# Patient Record
Sex: MALE | Race: WHITE | Employment: FULL TIME | URBAN - METROPOLITAN AREA
[De-identification: names, ages, dates, MRNs, and addresses within clinical notes are randomized per-mention and may not be internally consistent; named-entity substitution may affect disease eponyms.]

---

## 2017-04-04 ENCOUNTER — OFFICE VISIT (OUTPATIENT)
Dept: INTERNAL MEDICINE CLINIC | Age: 32
End: 2017-04-04

## 2017-04-04 VITALS
TEMPERATURE: 98.2 F | WEIGHT: 178 LBS | BODY MASS INDEX: 23.59 KG/M2 | HEART RATE: 94 BPM | SYSTOLIC BLOOD PRESSURE: 140 MMHG | RESPIRATION RATE: 14 BRPM | DIASTOLIC BLOOD PRESSURE: 102 MMHG | HEIGHT: 73 IN

## 2017-04-04 DIAGNOSIS — B35.6 TINEA CRURIS: Primary | ICD-10-CM

## 2017-04-04 DIAGNOSIS — R21 PENILE RASH: ICD-10-CM

## 2017-04-04 DIAGNOSIS — N50.89 TESTICULAR SWELLING, RIGHT: ICD-10-CM

## 2017-04-04 RX ORDER — NYSTATIN AND TRIAMCINOLONE ACETONIDE 100000; 1 [USP'U]/G; MG/G
OINTMENT TOPICAL 2 TIMES DAILY
Qty: 30 G | Refills: 0 | Status: SHIPPED | OUTPATIENT
Start: 2017-04-04 | End: 2017-06-07 | Stop reason: SDUPTHER

## 2017-04-04 NOTE — PROGRESS NOTES
Chief Complaint   Patient presents with    Testicular Cyst     right     he is a 32y.o. year old male who presents for evaluation of pt thought he felt a lump on right side of testicles about 1 week ago. Wanted to make sure this was ok. Reviewed and agree with Nurse Note and duplicated in this note. Reviewed PmHx, RxHx, FmHx, SocHx, AllgHx and updated and dated in the chart. No family history on file. No past medical history on file. Social History     Social History    Marital status: UNKNOWN     Spouse name: N/A    Number of children: N/A    Years of education: N/A     Social History Main Topics    Smoking status: Never Smoker    Smokeless tobacco: Not on file    Alcohol use Yes      Comment: average 1 beer/day or less    Drug use: No    Sexual activity: Not on file     Other Topics Concern    Not on file     Social History Narrative    No narrative on file        Review of Systems - negative except as listed above      Objective:     Vitals:    04/04/17 0949   BP: (!) 158/102   Pulse: 94   Resp: 14   Temp: 98.2 °F (36.8 °C)   Weight: 178 lb (80.7 kg)   Height: 6' 1\" (1.854 m)       Physical Examination: General appearance - alert, well appearing, and in no distress  Back exam - full range of motion, no tenderness, palpable spasm or pain on motion  Neurological - alert, oriented, normal speech, no focal findings or movement disorder noted  Musculoskeletal - no joint tenderness, deformity or swelling  Extremities - peripheral pulses normal, no pedal edema, no clubbing or cyanosis  Skin - DERMATITIS NOTED: atopic dermatitis on penis head, scaly;  Gluteal cleft red raised rash, no pus or opening  Testicles - right testicle enlarged epididymis. Assessment/ Plan:   Matias Link was seen today for testicular cyst.    Diagnoses and all orders for this visit:    Tinea cruris    Penile rash    Testicular swelling, right  -     US SCROTUM/TESTICLES;  Future    Other orders  -     nystatin-triamcinolone (MYCOLOG) 100,000-0.1 unit/gram-% ointment; Apply  to affected area two (2) times a day. Follow-up Disposition:  Return if symptoms worsen or fail to improve. I have discussed the diagnosis with the patient and the intended plan as seen in the above orders. The patient has received an after-visit summary and questions were answered concerning future plans. Medication Side Effects and Warnings were discussed with patient: yes  Patient Labs were reviewed and or requested: yes  Patient Past Records were reviewed and or requested  yes  I have discussed the diagnosis with the patient and the intended plan as seen in the above orders. The patient has received an after-visit summary and questions were answered concerning future plans. Pt agrees to call or return to clinic and/or go to closest ER with any worsening of symptoms. This may include, but not limited to increased fever (>100.4) with NSAIDS or Tylenol, increased edema, confusion, rash, worsening of presenting symptoms. 1) Remember to stay active and/or exercise regularly (I suggest 30-45 minutes daily)   2) For reliable dietary information, go to www. EATRIGHT.org. You may wish to consider seeing the nutritionist at [facility] at #[phone] or [phone], also consider the [address]   3) I routinely suggest a complete physical exam once each year (your birth month)

## 2017-04-04 NOTE — PATIENT INSTRUCTIONS

## 2017-04-04 NOTE — MR AVS SNAPSHOT
Visit Information Date & Time Provider Department Dept. Phone Encounter #  
 4/4/2017  9:45 AM [facility] [facility] MD Dotty [facility] and [place] [id number] Follow-up Instructions Return if symptoms worsen or fail to improve. Upcoming Health Maintenance Date Due DTaP/Tdap/Td series (1 - Tdap) 6/28/2006 Allergies as of 4/4/2017  Review Complete On: 4/4/2017 By: [address] [facility] MD Dotty  
 No Known Allergies Current Immunizations  Never Reviewed Name Date Influenza Vaccine (Quad) PF 12/7/2016 Not reviewed this visit You Were Diagnosed With   
  
 Codes Comments Tinea cruris    -  Primary ICD-10-CM: B35.6 ICD-9-CM: 110.3 Penile rash     ICD-10-CM: R21 
ICD-9-CM: 607.9 Testicular swelling, right     ICD-10-CM: N50.89 ICD-9-CM: 115.09 Vitals BP Pulse Temp Resp Height(growth percentile) Weight(growth percentile) (!) 140/102 94 98.2 °F (36.8 °C) 14 6' 1\" (1.854 m) 178 lb (80.7 kg) BMI Smoking Status 23.48 kg/m2 Never Smoker Vitals History BMI and BSA Data Body Mass Index Body Surface Area  
 23.48 kg/m 2 2.04 m 2 Preferred Pharmacy Pharmacy Name Phone [id number] [phone] Your Updated Medication List  
  
   
This list is accurate as of: 4/4/17 10:10 AM.  Always use your most recent med list.  
  
  
  
  
 nystatin-triamcinolone 100,000-0.1 unit/gram-% ointment Commonly known as:  Daleen Savant Apply  to affected area two (2) times a day. Prescriptions Sent to Pharmacy Refills  
 nystatin-triamcinolone (MYCOLOG) 100,000-0.1 unit/gram-% ointment 0 Sig: Apply  to affected area two (2) times a day. Class: Normal  
 Pharmacy: [address], [place] Ph #: [phone] Route: Topical  
  
Follow-up Instructions Return if symptoms worsen or fail to improve. To-Do List   
 04/04/2017 Imaging:  US SCROTUM/TESTICLES Patient Instructions Rash: Care Instructions Your Care Instructions A rash is any irritation or inflammation of the skin. Rashes have many possible causes, including allergy, infection, illness, heat, and emotional stress. Follow-up care is a key part of your treatment and safety. Be sure to make and go to all appointments, and call your doctor if you are having problems. Its also a good idea to know your test results and keep a list of the medicines you take. How can you care for yourself at home? · Wash the area with water only. Soap can make dryness and itching worse. Pat dry. · Put cold, wet cloths on the rash to reduce itching. · Keep cool, and stay out of the sun. · Leave the rash open to the air as much of the time as possible. · Sometimes petroleum jelly (Vaseline) can help relieve the discomfort caused by a rash. A moisturizing lotion, such as Cetaphil, also may help. Calamine lotion may help for rashes caused by contact with something (such as a plant or soap) that irritated the skin. Use it 3 or 4 times a day. · If your doctor prescribed a cream, use it as directed. If your doctor prescribed medicine, take it exactly as directed. · If your rash itches so badly that it interferes with your normal activities, take an over-the-counter antihistamine, such as diphenhydramine (Benadryl) or loratadine (Claritin). Read and follow all instructions on the label. When should you call for help? Call your doctor now or seek immediate medical care if: 
· You have signs of infection, such as: 
¨ Increased pain, swelling, warmth, or redness. ¨ Red streaks leading from the area. ¨ Pus draining from the area. ¨ A fever. · You have joint pain along with the rash. Watch closely for changes in your health, and be sure to contact your doctor if: · Your rash is changing or getting worse. For example, call if you have pain along with the rash, the rash is spreading, or you have new blisters. · You do not get better after 1 week. Where can you learn more? Go to http://shaid-louie.info/. Enter O198 in the search box to learn more about \"Rash: Care Instructions. \" Current as of: October 13, 2016 Content Version: 11.2 © 8108-5358 [employer]. Care instructions adapted under license by [employer] (which disclaims liability or warranty for this information). If you have questions about a medical condition or this instruction, always ask your healthcare professional. [address] any warranty or liability for your use of this information. Introducing [facility]! Dear [place]: Thank you for requesting a [facility]` account. Our records indicate that you already have an active [facility]` account. You can access your account anytime at [email or web address] Did you know that you can access your hospital and ER discharge instructions at any time in [facility]`? You can also review all of your test results from your hospital stay or ER visit. Additional Information If you have questions, please visit the Frequently Asked Questions section of the [facility]` website at [email or web address] Remember, [facility]` is NOT to be used for urgent needs. For medical emergencies, dial 911. Now available from your iPhone and Android! Please provide this summary of care documentation to your next provider. Your primary care clinician is listed as Arthur Macdonald. If you have any questions after today's visit, please call [phone].

## 2017-04-07 ENCOUNTER — HOSPITAL ENCOUNTER (OUTPATIENT)
Dept: ULTRASOUND IMAGING | Age: 32
Discharge: HOME | End: 2017-04-07
Attending: FAMILY MEDICINE
Payer: COMMERCIAL

## 2017-04-07 DIAGNOSIS — N50.89 TESTICULAR SWELLING, RIGHT: ICD-10-CM

## 2017-04-07 PROCEDURE — 76870 US EXAM SCROTUM: CPT

## 2017-04-14 ENCOUNTER — TELEPHONE (OUTPATIENT)
Dept: INTERNAL MEDICINE CLINIC | Age: 32
End: 2017-04-14

## 2017-04-14 NOTE — TELEPHONE ENCOUNTER
Pt called requesting to speak with Dr. Elizabeth Bowens stating he has questions concerning his last office visit.  Pt can be reached @ [phone]

## 2017-04-14 NOTE — TELEPHONE ENCOUNTER
Called pt and spoke with , confirmed for pt that an epididymal cyst should not interfere with trying to conceive a child.  Pt understands

## 2017-04-21 ENCOUNTER — OFFICE VISIT (OUTPATIENT)
Dept: INTERNAL MEDICINE CLINIC | Age: 32
End: 2017-04-21

## 2017-04-21 VITALS
TEMPERATURE: 98.1 F | BODY MASS INDEX: 23.59 KG/M2 | WEIGHT: 178 LBS | OXYGEN SATURATION: 100 % | HEART RATE: 58 BPM | DIASTOLIC BLOOD PRESSURE: 98 MMHG | SYSTOLIC BLOOD PRESSURE: 140 MMHG | RESPIRATION RATE: 15 BRPM | HEIGHT: 73 IN

## 2017-04-21 DIAGNOSIS — R03.0 ELEVATED BP WITHOUT DIAGNOSIS OF HYPERTENSION: Primary | ICD-10-CM

## 2017-04-21 NOTE — PATIENT INSTRUCTIONS
DASH diet: Healthy eating to lower your blood pressure  The DASH diet emphasizes portion size, eating a variety of foods and getting the right amount of nutrients. Discover how DASH can improve your health and lower your blood pressure. DASH stands for Dietary Approaches to Stop Hypertension. The DASH diet is a lifelong approach to healthy eating that's designed to help treat or prevent high blood pressure (hypertension). The DASH diet encourages you to reduce the sodium in your diet and eat a variety of foods rich in nutrients that help lower blood pressure, such as potassium, calcium and magnesium. By following the DASH diet, you may be able to reduce your blood pressure by a few points in just two weeks. Over time, your blood pressure could drop by eight to 14 points, which can make a significant difference in your health risks. Because the DASH diet is a healthy way of eating, it offers health benefits besides just lowering blood pressure. The DASH diet may offer protection against osteoporosis, cancer, heart disease, stroke and diabetes. And while the DASH diet is not a weight-loss program, you may indeed lose unwanted pounds because it can help guide you toward healthier meals and snacks. DASH diet: Sodium levels  A key goal of the DASH diet is reducing how much sodium you eat, since sodium can dramatically increase blood pressure in people who are sensitive to its effects. In addition to the standard DASH diet, there is also a lower sodium version of the diet. You can choose the version of the diet that meets your health needs:   Standard DASH diet. You can consume up to 2,300 milligrams (mg) of sodium a day. Lower sodium DASH diet. You can consume up to 1,500 mg of sodium a day. Both versions of the DASH diet aim to reduce the amount of sodium in your diet compared with what you might get in a more traditional diet, which can amount to a whopping 3,500 mg of sodium a day or more.  That level is far beyond the recommendation of the 2005 Dietary Guidelines for Americans of a maximum of 2,300 mg of sodium a day. Studies show that the lower sodium version of the DASH diet is especially helpful in lowering blood pressure for adults who are middle-aged or older, for -Americans and for those who already have high blood pressure. If you aren't sure which version of the DASH diet is best for you, talk to your doctor. DASH diet: What to eat  Both sodium versions of the DASH diet include lots of whole grains, fruits, vegetables and low-fat dairy products. The DASH diet also includes some fish, poultry and legumes. You can eat red meat, sweets and fats in small amounts. The DASH diet is low in saturated fat, cholesterol and total fat. Here's a look at the recommended servings from each food group for the 2,305-spezqvo-m-day DASH diet. Grains (6 to 8 servings a day)  Grains include bread, cereal, rice and pasta. Examples of one serving of grains include 1 slice whole-wheat bread, 1 ounce (oz.) dry cereal, or 1/2 cup cooked cereal, rice or pasta. Focus on whole grains because they have more fiber and nutrients than do refined grains. For instance, use brown rice instead of white rice, whole-wheat pasta instead of regular pasta and whole-grain bread instead of white bread. Look for products labeled \"100 percent whole grain\" or \"100 percent whole wheat. \"   Grains are naturally low in fat, so avoid spreading on butter or adding cream and cheese sauces. Vegetables (4 to 5 servings a day)  Tomatoes, carrots, broccoli, sweet potatoes, greens and other vegetables are full of fiber, vitamins, and such minerals as potassium and magnesium. Examples of one serving include 1 cup raw leafy green vegetables or 1/2 cup cut-up raw or cooked vegetables.    Don't think of vegetables only as side dishes -- a hearty blend of vegetables served over brown rice or whole-wheat noodles can serve as the main dish for a meal. Fresh or frozen vegetables are both good choices. When buying frozen and canned vegetables, choose those labeled as low sodium or without added salt. To increase the number of servings you fit in daily, be creative. In a stir-carney, for instance, cut the amount of meat in half and double up on the vegetables. Fruits (4 to 5 servings a day)  Many fruits need little preparation to become a healthy part of a meal or snack. Like vegetables, they're packed with fiber, potassium and magnesium and are typically low in fat -- exceptions include avocados and coconuts. Examples of one serving include 1 medium fruit or 1/2 cup fresh, frozen or canned fruit. Have a piece of fruit with meals and one as a snack, then round out your day with a dessert of fresh fruits topped with a splash of low-fat yogurt. Leave on edible peels whenever possible. The peels of apples, pears and most fruits with pits add interesting texture to recipes and contain healthy nutrients and fiber. Remember that citrus fruits and juice, such as grapefruit, can interact with certain medications, so check with your doctor or pharmacist to see if they're OK for you. Dairy (2 to 3 servings a day)  Milk, yogurt, cheese and other dairy products are major sources of calcium, vitamin D and protein. But the key is to make sure that you choose dairy products that are low-fat or fat-free because otherwise they can be a major source of fat. Examples of one serving include 1 cup skim or 1% milk, 1 cup yogurt or 1 1/2 oz. cheese. Low-fat or fat-free frozen yogurt can help you boost the amount of dairy products you eat while offering a sweet treat. Add fruit for a healthy twist.   If you have trouble digesting dairy products, choose lactose-free products or consider taking an over-the-counter product that contains the enzyme lactase, which can reduce or prevent the symptoms of lactose intolerance.    Go easy on regular and even fat-free cheeses because they are typically high in sodium. Lean meat, poultry and fish (6 or fewer servings a day)  Meat can be a rich source of protein, B vitamins, iron and zinc. But because even lean varieties contain fat and cholesterol, don't make them a mainstay of your diet -- cut back typical meat portions by one-third or one-half and pile on the vegetables instead. Examples of one serving include 1 oz. cooked skinless poultry, seafood or lean meat, 1 egg, or 1 oz. water-packed, no-salt-added canned tuna. Trim away skin and fat from meat and then broil, grill, roast or poach instead of frying. Eat heart-healthy fish, such as salmon, herring and tuna. These types of fish are high in omega-3 fatty acids, which can help lower your total cholesterol. Nuts, seeds and legumes (4 to 5 servings a week)   Almonds, sunflower seeds, kidney beans, peas, lentils and other foods in this family are good sources of magnesium, potassium and protein. They're also full of fiber and phytochemicals, which are plant compounds that may protect against some cancers and cardiovascular disease. Serving sizes are small and are intended to be consumed weekly because these foods are high in calories. Examples of one serving include 1/3 cup (1 1/2 oz.) nuts, 2 tablespoons seeds or 1/2 cup cooked beans or peas. Nuts sometimes get a bad rap because of their fat content, but they contain healthy types of fat -- monounsaturated fat and omega-3 fatty acids. They're high in calories, however, so eat them in moderation. Try adding them to stir-fries, salads or cereals. Soybean-based products, such as tofu and tempeh, can be a good alternative to meat because they contain all of the amino acids your body needs to make a complete protein, just like meat. They also contain isoflavones, a type of natural plant compound (phytochemical) that has been shown to have some health benefits.   Fats and oils (2 to 3 servings a day)  Fat helps your body absorb essential vitamins and helps your body's immune system. But too much fat increases your risk of heart disease, diabetes and obesity. The DASH diet strives for a healthy balance by providing 30 percent or less of daily calories from fat, with a focus on the healthier unsaturated fats. Examples of one serving include 1 teaspoon soft margarine, 1 tablespoon low-fat mayonnaise or 2 tablespoons light salad dressing. Saturated fat and trans fat are the main dietary culprits in raising your blood cholesterol and increasing your risk of coronary artery disease. DASH helps keep your daily saturated fat to less than 10 percent of your total calories by limiting use of meat, butter, cheese, whole milk, cream and eggs in your diet, along with foods made from lard, solid shortenings, and palm and coconut oils. Avoid trans fat, commonly found in such processed foods as crackers, baked goods and fried items. Read food labels on margarine and salad dressing so that you can choose those that are lowest in saturated fat and free of trans fat. Sweets (5 or fewer a week)  You don't have to banish sweets entirely while following the DASH diet -- just go easy on them. Examples of one serving include 1 tablespoon sugar, jelly or jam, 1/2 cup sorbet or 1 cup (8 oz.) lemonade. When you eat sweets, choose those that are fat-free or low-fat, such as sorbets, fruit ices, jelly beans, hard candy, dami crackers or low-fat cookies. Artificial sweeteners such as aspartame (NutraSweet, Equal) and sucralose (Splenda) may help satisfy your sweet tooth while sparing the sugar. But remember that you still must use them sensibly. It's OK to swap a diet cola for a regular cola, but not in place of a more nutritious beverage such as low-fat milk or even plain water. Cut back on added sugar, which has no nutritional value but can pack on calories. DASH diet: Alcohol and caffeine  Drinking too much alcohol can increase blood pressure.  The DASH diet recommends that men limit alcohol to two or fewer drinks a day and women one or less. The DASH diet doesn't address caffeine consumption. The influence of caffeine on blood pressure remains unclear. But caffeine can cause your blood pressure to rise at least temporarily. If you already have high blood pressure or if you think caffeine is affecting your blood pressure, talk to your doctor about your caffeine consumption. DASH diet and weight loss  The DASH diet is not designed to promote weight loss, but it can be used as part of an overall weight-loss strategy. The DASH diet is based on a diet of about 2,000 calories a day. If you're trying to lose weight, though, you may want to eat around 1,600 a day. You may need to adjust your serving goals based on your health or individual circumstances -- something your health care team can help you decide. Tips to cut back on sodium  The foods at the core of the DASH diet are naturally low in sodium. So just by following the DASH diet, you're likely to reduce your sodium intake. You also can cut back on sodium in your diet by:   Using sodium-free spices or flavorings with your food instead of salt   Not adding salt when cooking rice, pasta or hot cereal   Rinsing canned foods to remove some of the sodium   Buying foods labeled \"no salt added,\" \"sodium-free,\" \"low sodium\" or \"very low sodium\"  One teaspoon of table salt has about 2,300 mg of sodium, and 2/3 teaspoon of table salt has about 1,500 mg of sodium. When you read food labels, you may be surprised at just how much sodium some processed foods contain. Even low-fat soups, canned vegetables, ready-to-eat cereals and sliced turkey from the local deli -- all foods you may have considered healthy -- often have lots of sodium. You may not notice a difference in taste when you choose low-sodium food and beverages.  If things seem too bland, gradually introduce low-sodium foods and cut back on table salt until you reach your sodium goal. That'll give your palate time to adjust. It can take several weeks for your taste buds to get used to less salty foods. Putting the pieces of the DASH diet together   Try these strategies to get started on the DASH diet:   Change gradually. To boost your success, avoid dramatic changes in your eating approach. Instead, change one or two things at a time. If you now eat only one or two servings of fruits or vegetables a day, try to add a serving at lunch and one at dinner. Rather than switching to all whole grains, start by making one or two of your grain servings whole grains. Increasing fruits, vegetables and whole grains gradually can also help prevent bloating or diarrhea that may occur if you aren't used to eating a diet with lots of fiber. You can also try over-the-counter products to help reduce gas from beans and vegetables. Forgive yourself if you backslide. Everyone slips, especially when learning something new. Remember that changing your lifestyle is a long-term process. Find out what triggered your setback and then just  where you left off with the DASH diet. Reward successes. Reward yourself with a nonfood treat for your accomplishments. Add physical activity. To boost your blood pressure lowering efforts even more, consider increasing your physical activity in addition to following the DASH diet. Combining both the DASH diet and physical activity makes it more likely that you'll reduce your blood pressure. Get support if you need it. If you're having trouble sticking to your diet, talk to your doctor or dietitian about it. You might get some tips that will help you stick to the DASH diet. Remember, healthy eating isn't an all-or-nothing proposition. What's most important is that, on average, you eat healthier foods with plenty of variety -- both to keep your diet nutritious and to avoid boredom or extremes. And with the DASH diet, you can have both.

## 2017-04-21 NOTE — PROGRESS NOTES
Chief Complaint   Patient presents with    Hypertension     concerned about Blood pressure     Pt who presents for follow up of a pre-existing problem of ***. Diet and Lifestyle: generally follows a low fat low cholesterol diet, generally follows a low sodium diet, exercises regularly  Home BP Monitoring: is borderline controlled at home, ranging 130's/*90**'s  Use of agents associated with hypertension: none. Cardiovascular ROS: taking medications as instructed, no medication side effects noted, no TIA's, no chest pain on exertion, no dyspnea on exertion, no swelling of ankles. New concerns: none. Reviewed and agree with Nurse Note and duplicated in this note. Reviewed PmHx, RxHx, FmHx, SocHx, AllgHx and updated and dated in the chart. No family history on file. No past medical history on file. Social History     Social History    Marital status:      Spouse name: N/A    Number of children: N/A    Years of education: N/A     Social History Main Topics    Smoking status: Never Smoker    Smokeless tobacco: Not on file    Alcohol use Yes      Comment: average 1 beer/day or less    Drug use: No    Sexual activity: Not on file     Other Topics Concern    Not on file     Social History Narrative    No narrative on file        Review of Systems - negative except as listed above  {ros master:012643}    Objective: There were no vitals filed for this visit. Physical Examination: {male adult master:640362}    Assessment/ Plan:   There are no diagnoses linked to this encounter. Follow-up Disposition: Not on File    I have discussed the diagnosis with the patient and the intended plan as seen in the above orders. The patient has received an after-visit summary and questions were answered concerning future plans.      Medication Side Effects and Warnings were discussed with patient: {yes/ no default yes:72797::\"yes\"}  Patient Labs were reviewed and or requested: {yes/ no default yes:19425::\"yes\"}  Patient Past Records were reviewed and or requested  {yes/ no default yes:19425::\"yes\"}  I have discussed the diagnosis with the patient and the intended plan as seen in the above orders. The patient has received an after-visit summary and questions were answered concerning future plans. Pt agrees to call or return to clinic and/or go to closest ER with any worsening of symptoms. This may include, but not limited to increased fever (>100.4) with NSAIDS or Tylenol, increased edema, confusion, rash, worsening of presenting symptoms. Patient was informed/counseled to:    1) Remember to stay active and/or exercise regularly (I suggest 30-45 minutes daily)   2) For reliable dietary information, go to www. EATRIGHT.org. You may wish to consider seeing the nutritionist at [facility] at #[phone] or [phone], also consider the [address] [facility].   3) I routinely suggest a complete physical exam once each year (your birth month)

## 2017-04-21 NOTE — PROGRESS NOTES
CCP: Hypertension    S: Elena Bassett is a 32 y.o. male who presents for BP follow up. BP has been elevated for past couple of office visits. He has been checking it at home for past several days, running 120-130/80s. Reports he is more nervous at baseline and feels anxious coming into doctor's office. Denies cardiac complaints including chest pain or discomfort, elevated heart rate,  palpitations or edema in extremities. Denies any headache, vision changes, numbness and tingling or weakness in  extremities. Denies respiratory complaints including SOB, difficulty or pain with breathing, wheezes, or cough. Feels well and ROS is otherwise negative. Eats balanced diet, low in saturated and trans fats. No past medical history on file. Current Outpatient Prescriptions   Medication Sig Dispense Refill    nystatin-triamcinolone (MYCOLOG) 100,000-0.1 unit/gram-% ointment Apply  to affected area two (2) times a day. 30 g 0     Pt is taking all medications as prescribed without any side effects or difficulty. No Known Allergies     O: VS:   Visit Vitals    BP (!) 140/98 (BP 1 Location: Right arm)    Pulse (!) 58    Temp 98.1 °F (36.7 °C)    Resp 15    Ht 6' 1\" (1.854 m)    Wt 178 lb (80.7 kg)    SpO2 100%    BMI 23.48 kg/m2     PAIN: No complaints of pain today. GENERAL: Elena Bassett is sitting on exam table in no acute distress. Non-toxic. Well nourished. Well developed. Appropriately groomed. RESP: Breath sounds are symmetrical bilaterally. Unlabored without SOB. Speaking in full sentences. Clear to auscultation bilaterally anteriorly and posteriorly. No wheezes. No rales or rhonchi. CV: normal rate. Regular rhythm. S1, S2 audible. No murmur noted. No rubs, clicks or gallops noted. HEME/LYMPH: peripheral pulses palpable 2+ radial.  No peripheral edema is noted. Assessment and Plan:    Severa Ingle was seen today for hypertension.     Diagnoses and all orders for this visit:    Elevated BP without diagnosis of hypertension    BP readings at home are at goal.  As pt reports he is anxious at baseline, particularly when coming to doctor's office, likely elevated due to this. Reinforced importance of lower sodium intake, regular aerboci exercise regimen and stress relieving measures. May need to consider treatment for anxiety or persistent HTN down the road, but pt declines this at this time.      Patient verbalized understanding and agreed to plan of care. Patient was given an after visit summary which included current diagnoses, medications and vital signs.     Follow up in 3 months

## 2017-04-21 NOTE — MR AVS SNAPSHOT
Visit Information Date & Time Provider Department Dept. Phone Encounter #  
 4/21/2017 10:45 AM Marly Worthington MD [facility] [phone] [id number] Follow-up Instructions Return in about 4 months (around 8/21/2017) for Blood Pressure Check. Upcoming Health Maintenance Date Due DTaP/Tdap/Td series (2 - Td) 4/21/2027 Allergies as of 4/21/2017  Review Complete On: 4/21/2017 By: Marly Worthington MD  
 No Known Allergies Current Immunizations  Never Reviewed Name Date Influenza Vaccine (Quad) PF 12/7/2016 Not reviewed this visit Vitals BP Pulse Temp Resp Height(growth percentile) Weight(growth percentile) (!) 150/106 (!) 58 98.1 °F (36.7 °C) 15 6' 1\" (1.854 m) 178 lb (80.7 kg) SpO2 BMI Smoking Status 100% 23.48 kg/m2 Never Smoker BMI and BSA Data Body Mass Index Body Surface Area  
 23.48 kg/m 2 2.04 m 2 Preferred Pharmacy Pharmacy Name Phone [id number] [phone] Your Updated Medication List  
  
   
This list is accurate as of: 4/21/17 11:26 AM.  Always use your most recent med list.  
  
  
  
  
 nystatin-triamcinolone 100,000-0.1 unit/gram-% ointment Commonly known as:  Izora Quileute Apply  to affected area two (2) times a day. Follow-up Instructions Return in about 4 months (around 8/21/2017) for Blood Pressure Check. Patient Instructions DASH diet: Healthy eating to lower your blood pressure The DASH diet emphasizes portion size, eating a variety of foods and getting the right amount of nutrients. Discover how DASH can improve your health and lower your blood pressure. DASH stands for Dietary Approaches to Stop Hypertension. The DASH diet is a lifelong approach to healthy eating that's designed to help treat or prevent high blood pressure (hypertension).  The DASH diet encourages you to reduce the sodium in your diet and eat a variety of foods rich in nutrients that help lower blood pressure, such as potassium, calcium and magnesium. By following the DASH diet, you may be able to reduce your blood pressure by a few points in just two weeks. Over time, your blood pressure could drop by eight to 14 points, which can make a significant difference in your health risks. Because the DASH diet is a healthy way of eating, it offers health benefits besides just lowering blood pressure. The DASH diet may offer protection against osteoporosis, cancer, heart disease, stroke and diabetes. And while the DASH diet is not a weight-loss program, you may indeed lose unwanted pounds because it can help guide you toward healthier meals and snacks. DASH diet: Sodium levels A key goal of the DASH diet is reducing how much sodium you eat, since sodium can dramatically increase blood pressure in people who are sensitive to its effects. In addition to the standard DASH diet, there is also a lower sodium version of the diet. You can choose the version of the diet that meets your health needs:  
Standard DASH diet. You can consume up to 2,300 milligrams (mg) of sodium a day. Lower sodium DASH diet. You can consume up to 1,500 mg of sodium a day. Both versions of the DASH diet aim to reduce the amount of sodium in your diet compared with what you might get in a more traditional diet, which can amount to a whopping 3,500 mg of sodium a day or more. That level is far beyond the recommendation of the 2005 Dietary Guidelines for Americans of a maximum of 2,300 mg of sodium a day. Studies show that the lower sodium version of the DASH diet is especially helpful in lowering blood pressure for adults who are middle-aged or older, for -Americans and for those who already have high blood pressure. If you aren't sure which version of the DASH diet is best for you, talk to your doctor. DASH diet: What to eat Both sodium versions of the DASH diet include lots of whole grains, fruits, vegetables and low-fat dairy products. The DASH diet also includes some fish, poultry and legumes. You can eat red meat, sweets and fats in small amounts. The DASH diet is low in saturated fat, cholesterol and total fat. Here's a look at the recommended servings from each food group for the 2,629-xtduiga-x-day DASH diet. Grains (6 to 8 servings a day) Grains include bread, cereal, rice and pasta. Examples of one serving of grains include 1 slice whole-wheat bread, 1 ounce (oz.) dry cereal, or 1/2 cup cooked cereal, rice or pasta. Focus on whole grains because they have more fiber and nutrients than do refined grains. For instance, use brown rice instead of white rice, whole-wheat pasta instead of regular pasta and whole-grain bread instead of white bread. Look for products labeled \"100 percent whole grain\" or \"100 percent whole wheat. \" Grains are naturally low in fat, so avoid spreading on butter or adding cream and cheese sauces. Vegetables (4 to 5 servings a day) Tomatoes, carrots, broccoli, sweet potatoes, greens and other vegetables are full of fiber, vitamins, and such minerals as potassium and magnesium. Examples of one serving include 1 cup raw leafy green vegetables or 1/2 cup cut-up raw or cooked vegetables. Don't think of vegetables only as side dishes  a hearty blend of vegetables served over brown rice or whole-wheat noodles can serve as the main dish for a meal.  
Fresh or frozen vegetables are both good choices. When buying frozen and canned vegetables, choose those labeled as low sodium or without added salt. To increase the number of servings you fit in daily, be creative. In a stir-carney, for instance, cut the amount of meat in half and double up on the vegetables. Fruits (4 to 5 servings a day) Many fruits need little preparation to become a healthy part of a meal or snack. Like vegetables, they're packed with fiber, potassium and magnesium and are typically low in fat  exceptions include avocados and coconuts. Examples of one serving include 1 medium fruit or 1/2 cup fresh, frozen or canned fruit. Have a piece of fruit with meals and one as a snack, then round out your day with a dessert of fresh fruits topped with a splash of low-fat yogurt. Leave on edible peels whenever possible. The peels of apples, pears and most fruits with pits add interesting texture to recipes and contain healthy nutrients and fiber. Remember that citrus fruits and juice, such as grapefruit, can interact with certain medications, so check with your doctor or pharmacist to see if they're OK for you. Dairy (2 to 3 servings a day) Milk, yogurt, cheese and other dairy products are major sources of calcium, vitamin D and protein. But the key is to make sure that you choose dairy products that are low-fat or fat-free because otherwise they can be a major source of fat. Examples of one serving include 1 cup skim or 1% milk, 1 cup yogurt or 1 1/2 oz. cheese. Low-fat or fat-free frozen yogurt can help you boost the amount of dairy products you eat while offering a sweet treat. Add fruit for a healthy twist.  
If you have trouble digesting dairy products, choose lactose-free products or consider taking an over-the-counter product that contains the enzyme lactase, which can reduce or prevent the symptoms of lactose intolerance. Go easy on regular and even fat-free cheeses because they are typically high in sodium. Lean meat, poultry and fish (6 or fewer servings a day) Meat can be a rich source of protein, B vitamins, iron and zinc. But because even lean varieties contain fat and cholesterol, don't make them a mainstay of your diet  cut back typical meat portions by one-third or one-half and pile on the vegetables instead.  Examples of one serving include 1 oz. cooked skinless poultry, seafood or lean meat, 1 egg, or 1 oz. water-packed, no-salt-added canned tuna. Trim away skin and fat from meat and then broil, grill, roast or poach instead of frying. Eat heart-healthy fish, such as salmon, herring and tuna. These types of fish are high in omega-3 fatty acids, which can help lower your total cholesterol. Nuts, seeds and legumes (4 to 5 servings a week) Almonds, sunflower seeds, kidney beans, peas, lentils and other foods in this family are good sources of magnesium, potassium and protein. They're also full of fiber and phytochemicals, which are plant compounds that may protect against some cancers and cardiovascular disease. Serving sizes are small and are intended to be consumed weekly because these foods are high in calories. Examples of one serving include 1/3 cup (1 1/2 oz.) nuts, 2 tablespoons seeds or 1/2 cup cooked beans or peas. Nuts sometimes get a bad rap because of their fat content, but they contain healthy types of fat  monounsaturated fat and omega-3 fatty acids. They're high in calories, however, so eat them in moderation. Try adding them to stir-fries, salads or cereals. Soybean-based products, such as tofu and tempeh, can be a good alternative to meat because they contain all of the amino acids your body needs to make a complete protein, just like meat. They also contain isoflavones, a type of natural plant compound (phytochemical) that has been shown to have some health benefits. Fats and oils (2 to 3 servings a day) Fat helps your body absorb essential vitamins and helps your body's immune system. But too much fat increases your risk of heart disease, diabetes and obesity. The DASH diet strives for a healthy balance by providing 30 percent or less of daily calories from fat, with a focus on the healthier unsaturated fats.  Examples of one serving include 1 teaspoon soft margarine, 1 tablespoon low-fat mayonnaise or 2 tablespoons light salad dressing. Saturated fat and trans fat are the main dietary culprits in raising your blood cholesterol and increasing your risk of coronary artery disease. DASH helps keep your daily saturated fat to less than 10 percent of your total calories by limiting use of meat, butter, cheese, whole milk, cream and eggs in your diet, along with foods made from lard, solid shortenings, and palm and coconut oils. Avoid trans fat, commonly found in such processed foods as crackers, baked goods and fried items. Read food labels on margarine and salad dressing so that you can choose those that are lowest in saturated fat and free of trans fat. Sweets (5 or fewer a week) You don't have to banish sweets entirely while following the DASH diet  just go easy on them. Examples of one serving include 1 tablespoon sugar, jelly or jam, 1/2 cup sorbet or 1 cup (8 oz.) lemonade. When you eat sweets, choose those that are fat-free or low-fat, such as sorbets, fruit ices, jelly beans, hard candy, dami crackers or low-fat cookies. Artificial sweeteners such as aspartame (NutraSweet, Equal) and sucralose (Splenda) may help satisfy your sweet tooth while sparing the sugar. But remember that you still must use them sensibly. It's OK to swap a diet cola for a regular cola, but not in place of a more nutritious beverage such as low-fat milk or even plain water. Cut back on added sugar, which has no nutritional value but can pack on calories. DASH diet: Alcohol and caffeine Drinking too much alcohol can increase blood pressure. The DASH diet recommends that men limit alcohol to two or fewer drinks a day and women one or less. The DASH diet doesn't address caffeine consumption. The influence of caffeine on blood pressure remains unclear. But caffeine can cause your blood pressure to rise at least temporarily.  If you already have high blood pressure or if you think caffeine is affecting your blood pressure, talk to your doctor about your caffeine consumption. DASH diet and weight loss The DASH diet is not designed to promote weight loss, but it can be used as part of an overall weight-loss strategy. The DASH diet is based on a diet of about 2,000 calories a day. If you're trying to lose weight, though, you may want to eat around 1,600 a day. You may need to adjust your serving goals based on your health or individual circumstances  something your health care team can help you decide. Tips to cut back on sodium The foods at the core of the DASH diet are naturally low in sodium. So just by following the DASH diet, you're likely to reduce your sodium intake. You also can cut back on sodium in your diet by:  
Using sodium-free spices or flavorings with your food instead of salt Not adding salt when cooking rice, pasta or hot cereal  
Rinsing canned foods to remove some of the sodium Buying foods labeled \"no salt added,\" \"sodium-free,\" \"low sodium\" or \"very low sodium\" One teaspoon of table salt has about 2,300 mg of sodium, and 2/3 teaspoon of table salt has about 1,500 mg of sodium. When you read food labels, you may be surprised at just how much sodium some processed foods contain. Even low-fat soups, canned vegetables, ready-to-eat cereals and sliced turkey from the local deli  all foods you may have considered healthy  often have lots of sodium. You may not notice a difference in taste when you choose low-sodium food and beverages. If things seem too bland, gradually introduce low-sodium foods and cut back on table salt until you reach your sodium goal. That'll give your palate time to adjust. It can take several weeks for your taste buds to get used to less salty foods. Putting the pieces of the DASH diet together Try these strategies to get started on the DASH diet: Change gradually. To boost your success, avoid dramatic changes in your eating approach. Instead, change one or two things at a time. If you now eat only one or two servings of fruits or vegetables a day, try to add a serving at lunch and one at dinner. Rather than switching to all whole grains, start by making one or two of your grain servings whole grains. Increasing fruits, vegetables and whole grains gradually can also help prevent bloating or diarrhea that may occur if you aren't used to eating a diet with lots of fiber. You can also try over-the-counter products to help reduce gas from beans and vegetables. Forgive yourself if you backslide. Everyone slips, especially when learning something new. Remember that changing your lifestyle is a long-term process. Find out what triggered your setback and then just  where you left off with the DASH diet. Reward successes. Reward yourself with a nonfood treat for your accomplishments. Add physical activity. To boost your blood pressure lowering efforts even more, consider increasing your physical activity in addition to following the DASH diet. Combining both the DASH diet and physical activity makes it more likely that you'll reduce your blood pressure. Get support if you need it. If you're having trouble sticking to your diet, talk to your doctor or dietitian about it. You might get some tips that will help you stick to the DASH diet. Remember, healthy eating isn't an all-or-nothing proposition. What's most important is that, on average, you eat healthier foods with plenty of variety  both to keep your diet nutritious and to avoid boredom or extremes. And with the DASH diet, you can have both. Introducing [facility]! Deaarac[employer]: Thank you for requesting a [employer] account. Our records indicate that you already have an active [employer] account. You can access your account anytime at [email or web address] Did you know that you can access your hospital and ER discharge instructions at any time in [employer]? You can also review all of your test results from your hospital stay or ER visit. Additional Information If you have questions, please visit the Frequently Asked Questions section of the [employer] website at [email or web address] Remember, [employer] is NOT to be used for urgent needs. For medical emergencies, dial 911. Now available from your iPhone and Android! Please provide this summary of care documentation to your next provider. Your primary care clinician is listed as Paco Vargas. If you have any questions after today's visit, please call [phone].

## 2017-06-01 ENCOUNTER — OFFICE VISIT (OUTPATIENT)
Dept: INTERNAL MEDICINE CLINIC | Age: 32
End: 2017-06-01

## 2017-06-01 VITALS
DIASTOLIC BLOOD PRESSURE: 107 MMHG | TEMPERATURE: 97.9 F | RESPIRATION RATE: 14 BRPM | HEIGHT: 73 IN | HEART RATE: 90 BPM | WEIGHT: 176 LBS | BODY MASS INDEX: 23.33 KG/M2 | OXYGEN SATURATION: 98 % | SYSTOLIC BLOOD PRESSURE: 150 MMHG

## 2017-06-01 DIAGNOSIS — R03.0 ELEVATED BLOOD PRESSURE READING: ICD-10-CM

## 2017-06-01 DIAGNOSIS — M62.838 MUSCLE SPASM: ICD-10-CM

## 2017-06-01 DIAGNOSIS — M54.50 ACUTE BILATERAL LOW BACK PAIN WITHOUT SCIATICA: Primary | ICD-10-CM

## 2017-06-01 LAB
BILIRUB UR QL STRIP: NEGATIVE
GLUCOSE UR-MCNC: NEGATIVE MG/DL
KETONES P FAST UR STRIP-MCNC: NEGATIVE MG/DL
PH UR STRIP: 6 [PH] (ref 4.6–8)
PROT UR QL STRIP: NEGATIVE MG/DL
SP GR UR STRIP: 1.01 (ref 1–1.03)
UA UROBILINOGEN AMB POC: NORMAL (ref 0.2–1)
URINALYSIS CLARITY POC: CLEAR
URINALYSIS COLOR POC: YELLOW
URINE BLOOD POC: NEGATIVE
URINE LEUKOCYTES POC: NEGATIVE
URINE NITRITES POC: NEGATIVE

## 2017-06-01 NOTE — MR AVS SNAPSHOT
Visit Information Date & Time Provider Department Dept. Phone Encounter #  
 6/1/2017 11:30 AM [facility] [place] [address] [id number] Your Appointments 8/31/2017 10:00 AM  
Any with Madalyn Bhatia MD  
[address] [place] [address] and Primary Care [address] [facility]) Appt Note: 4 mo f/up for blood pressure check [address] [facility]  
  
   
 [address] [id number] Upcoming Health Maintenance Date Due INFLUENZA AGE 9 TO ADULT 8/1/2017 DTaP/Tdap/Td series (2 - Td) 4/21/2027 Allergies as of 6/1/2017  Review Complete On: 6/1/2017 By: Maddy De La Garza No Known Allergies Current Immunizations  Never Reviewed Name Date Influenza Vaccine (Quad) PF 12/7/2016 Not reviewed this visit Vitals BP Pulse Temp Resp Height(growth percentile) Weight(growth percentile) (!) 150/107 (BP 1 Location: Right arm, BP Patient Position: Sitting) 90 97.9 °F (36.6 °C) (Oral) 14 6' 1\" (1.854 m) 176 lb (79.8 kg) SpO2 BMI Smoking Status 98% 23.22 kg/m2 Never Smoker Vitals History BMI and BSA Data Body Mass Index Body Surface Area  
 23.22 kg/m 2 2.03 m 2 Preferred Pharmacy Pharmacy Name Phone [id number] [phone] Your Updated Medication List  
  
   
This list is accurate as of: 6/1/17 12:38 PM.  Always use your most recent med list.  
  
  
  
  
 nystatin-triamcinolone 100,000-0.1 unit/gram-% ointment Commonly known as:  Tignall Rue Apply  to affected area two (2) times a day. Patient Instructions Back Care and Preventing Injuries: Care Instructions Your Care Instructions You can hurt your back doing many everyday activities: lifting a heavy box, bending down to garden, exercising at the gym, and even getting out of bed. But you can keep your back strong and healthy by doing some exercises. You also can follow a few tips for sitting, sleeping, and lifting to avoid hurting your back again. Talk to your doctor before you start an exercise program. Ask for help if you want to learn more about keeping your back healthy. Follow-up care is a key part of your treatment and safety. Be sure to make and go to all appointments, and call your doctor if you are having problems. It's also a good idea to know your test results and keep a list of the medicines you take. How can you care for yourself at home? · Stay at a healthy weight to avoid strain on your lower back. · Do not smoke. Smoking increases the risk of osteoporosis, which weakens the spine. If you need help quitting, talk to your doctor about stop-smoking programs and medicines. These can increase your chances of quitting for good. · Make sure you sleep in a position that maintains your back's normal curves and on a mattress that feels comfortable. Sleep on your side with a pillow between your knees, or sleep on your back with a pillow under your knees. These positions can reduce strain on your back. · When you get out of bed, lie on your side and bend both knees. Drop your feet over the edge of the bed as you push up with both arms. Scoot to the edge of the bed. Make sure your feet are in line with your rear end (buttocks), and then stand up. · If you must stand for a long time, put one foot on a stool, ledge, or box. Exercise to strengthen your back and other muscles · Get at least 30 minutes of exercise on most days of the week. Walking is a good choice. You also may want to do other activities, such as running, swimming, cycling, or playing tennis or team sports. · Stretch your back muscles. Here are few exercises to try: ¨ Lie on your back with your knees bent and your feet flat on the floor. Gently pull one bent knee to your chest. Put that foot back on the floor, and then pull the other knee to your chest. Hold for 15 to 30 seconds. Repeat 2 to 4 times. ¨ Do pelvic tilts. Lie on your back with your knees bent. Tighten your stomach muscles. Pull your belly button (navel) in and up toward your ribs. You should feel like your back is pressing to the floor and your hips and pelvis are slightly lifting off the floor. Hold for 6 seconds while breathing smoothly. · Keep your core muscles strong. The muscles of your back, belly (abdomen), and buttocks support your spine. ¨ Pull in your belly, and imagine pulling your navel toward your spine. Hold this for 6 seconds, then relax. Remember to keep breathing normally as you tense your muscles. ¨ Do curl-ups. Always do them with your knees bent. Keep your low back on the floor, and curl your shoulders toward your knees using a smooth, slow motion. Keep your arms folded across your chest. If this bothers your neck, try putting your hands behind your neck (not your head), with your elbows spread apart. ¨ Lie on your back with your knees bent and your feet flat on the floor. Tighten your belly muscles, and then push with your feet and raise your buttocks up a few inches. Hold this position 6 seconds as you continue to breathe normally, then lower yourself slowly to the floor. Repeat 8 to 12 times. ¨ If you like group exercise, try Pilates or yoga. These classes have poses that strengthen the core muscles. Protect your back when you sit · Place a small pillow, a rolled-up towel, or a lumbar roll in the curve of your back if you need extra support. · Sit in a chair that is low enough to let you place both feet flat on the floor with both knees nearly level with your hips. If your chair or desk is too high, use a foot rest to raise your knees. · When driving, keep your knees nearly level with your hips.  Sit straight, and drive with both hands on the steering wheel. Your arms should be in a slightly bent position. · Try a kneeling chair, which helps tilt your hips forward. This takes pressure off your lower back. · Try sitting on an exercise ball. It can rock from side to side, which helps keep your back loose. Lift properly · Squat down, bending at the hips and knees only. If you need to, put one knee to the floor and extend your other knee in front of you, bent at a right angle (half kneeling). · Press your chest straight forward. This helps keep your upper back straight while keeping a slight arch in your low back. · Hold the load as close to your body as possible, at the level of your navel. · Use your feet to change direction, taking small steps. · Lead with your hips as you change direction. Keep your shoulders in line with your hips as you move. Do not twist your body. · Set down your load carefully, squatting with your knees and hips only. When should you call for help? Watch closely for changes in your health, and be sure to contact your doctor if: 
· You want more exercises to make your back and other core muscles stronger. Where can you learn more? Go to http://shadi-louie.info/. Enter S810 in the search box to learn more about \"Back Care and Preventing Injuries: Care Instructions. \" Current as of: May 23, 2016 Content Version: 11.2 © 8338-0533 [employer]. Care instructions adapted under license by [employer]folio (which disclaims liability or warranty for this information). If you have questions about a medical condition or this instruction, always ask your healthcare professional. [address] any warranty or liability for your use of this information. Low Back Pain: Exercises Your Care Instructions Here are some examples of typical rehabilitation exercises for your condition. Start each exercise slowly. Ease off the exercise if you start to have pain. Your doctor or physical therapist will tell you when you can start these exercises and which ones will work best for you. How to do the exercises Press-up 1. Lie on your stomach, supporting your body with your forearms. 2. Press your elbows down into the floor to raise your upper back. As you do this, relax your stomach muscles and allow your back to arch without using your back muscles. As your press up, do not let your hips or pelvis come off the floor. 3. Hold for 15 to 30 seconds, then relax. 4. Repeat 2 to 4 times. Alternate arm and leg (bird dog) exercise Note: Do this exercise slowly. Try to keep your body straight at all times, and do not let one hip drop lower than the other. 1. Start on the floor, on your hands and knees. 2. Tighten your belly muscles. 3. Raise one leg off the floor, and hold it straight out behind you. Be careful not to let your hip drop down, because that will twist your trunk. 4. Hold for about 6 seconds, then lower your leg and switch to the other leg. 5. Repeat 8 to 12 times on each leg. 6. Over time, work up to holding for 10 to 30 seconds each time. 7. If you feel stable and secure with your leg raised, try raising the opposite arm straight out in front of you at the same time. Knee-to-chest exercise 1. Lie on your back with your knees bent and your feet flat on the floor. 2. Bring one knee to your chest, keeping the other foot flat on the floor (or keeping the other leg straight, whichever feels better on your lower back). 3. Keep your lower back pressed to the floor. Hold for at least 15 to 30 seconds. 4. Relax, and lower the knee to the starting position. 5. Repeat with the other leg. Repeat 2 to 4 times with each leg. 6. To get more stretch, put your other leg flat on the floor while pulling your knee to your chest. 
Curl-ups 1. Lie on the floor on your back with your knees bent at a 90-degree angle. Your feet should be flat on the floor, about 12 inches from your buttocks. 2. Cross your arms over your chest. If this bothers your neck, try putting your hands behind your neck (not your head), with your elbows spread apart. 3. Slowly tighten your belly muscles and raise your shoulder blades off the floor. 4. Keep your head in line with your body, and do not press your chin to your chest. 
5. Hold this position for 1 or 2 seconds, then slowly lower yourself back down to the floor. 6. Repeat 8 to 12 times. Pelvic tilt exercise 1. Lie on your back with your knees bent. 2. \"Brace\" your stomach. This means to tighten your muscles by pulling in and imagining your belly button moving toward your spine. You should feel like your back is pressing to the floor and your hips and pelvis are rocking back. 3. Hold for about 6 seconds while you breathe smoothly. 4. Repeat 8 to 12 times. Heel dig bridging 1. Lie on your back with both knees bent and your ankles bent so that only your heels are digging into the floor. Your knees should be bent about 90 degrees. 2. Then push your heels into the floor, squeeze your buttocks, and lift your hips off the floor until your shoulders, hips, and knees are all in a straight line. 3. Hold for about 6 seconds as you continue to breathe normally, and then slowly lower your hips back down to the floor and rest for up to 10 seconds. 4. Do 8 to 12 repetitions. Hamstring stretch in doorway 1. Lie on your back in a doorway, with one leg through the open door. 2. Slide your leg up the wall to straighten your knee. You should feel a gentle stretch down the back of your leg. 3. Hold the stretch for at least 15 to 30 seconds. Do not arch your back, point your toes, or bend either knee. Keep one heel touching the floor and the other heel touching the wall. 4. Repeat with your other leg. 5. Do 2 to 4 times for each leg. Hip flexor stretch 1. Kneel on the floor with one knee bent and one leg behind you. Place your forward knee over your foot. Keep your other knee touching the floor. 2. Slowly push your hips forward until you feel a stretch in the upper thigh of your rear leg. 3. Hold the stretch for at least 15 to 30 seconds. Repeat with your other leg. 4. Do 2 to 4 times on each side. Wall sit 1. Stand with your back 10 to 12 inches away from a wall. 2. Lean into the wall until your back is flat against it. 3. Slowly slide down until your knees are slightly bent, pressing your lower back into the wall. 4. Hold for about 6 seconds, then slide back up the wall. 5. Repeat 8 to 12 times. Follow-up care is a key part of your treatment and safety. Be sure to make and go to all appointments, and call your doctor if you are having problems. It's also a good idea to know your test results and keep a list of the medicines you take. Where can you learn more? Go to http://shadi-louie.info/. Enter R441 in the search box to learn more about \"Low Back Pain: Exercises. \" Current as of: May 23, 2016 Content Version: 11.2 © 2888-6454 [employer]. Care instructions adapted under license by [employer] (which disclaims liability or warranty for this information). If you have questions about a medical condition or this instruction, always ask your healthcare professional. [place] [address] any warranty or liability for your use of this information. Introducing [facility]! Dear Fabienne Yang: Thank you for requesting a [employer]ition account. Our records indicate that you already have an active [employer] Expedition account. You can access your account anytime at [email or web address] Did you know that you can access your hospital and ER discharge instructions at any time in Data Expedition?   You can also review all of your test results from your hospital stay or ER visit. Additional Information If you have questions, please visit the Frequently Asked Questions section of the [employer]Sand website at [email or web address] Remember, [employer]Sand is NOT to be used for urgent needs. For medical emergencies, dial 911. Now available from your iPhone and Android! Please provide this summary of care documentation to your next provider. Your primary care clinician is listed as Leno Christy. If you have any questions after today's visit, please call [phone].

## 2017-06-01 NOTE — PROGRESS NOTES
Mr. Vinnie Nolasco is a 32y.o. year old male who had concerns including Back Pain. Pt is new to me. HPI:  Chief Complaint   Patient presents with    Back Pain     Knots lower back bilateral, x2 weeks. Better with stretching, worse with massage/pressure. White coat hypertension, BP elevated. No sx  History reviewed. No pertinent past medical history. Current Outpatient Prescriptions   Medication Sig Dispense    nystatin-triamcinolone (MYCOLOG) 100,000-0.1 unit/gram-% ointment Apply  to affected area two (2) times a day. 30 g     No current facility-administered medications for this visit. Reviewed PmHx, RxHx, FmHx, SocHx, AllgHx and updated and dated in the chart. ROS: Negative except for BOLD  General: fever, chills, fatigue  Respiratory: cough, SOB, wheezing  Cardiovascular:  CP, palpitation, DRIVER, edema   Gastrointestinal: N/V/D, bleeding  Genito-Urinary: dysuria, hematuria  Musculoskeletal: muscle weakness, pain, swelling    OBJECTIVE:   Visit Vitals    BP (!) 150/107 (BP 1 Location: Right arm, BP Patient Position: Sitting)    Pulse 90    Temp 97.9 °F (36.6 °C) (Oral)    Resp 14    Ht 6' 1\" (1.854 m)    Wt 176 lb (79.8 kg)    SpO2 98%    BMI 23.22 kg/m2     GEN: The patient appears well, alert, oriented x 3, in no distress. ENT: bilateral TM and canal normal.  Neck supple. No adenopathy or thyromegaly. CELENA. Lungs: clear bilaterally, good air entry, no wheezes, rhonchi or rales. Cardiovascular: regular rate and rhythm. S1 and S2 normal, no murmurs,  Abdomen: + BS, soft without tenderness, guarding, rebound, mass or organomegaly. Extremities: no edema, normal peripheral pulses. Neurological: normal, gross sensory and motor in tact without focal findings. MSK:    Posture: Normal   Deformity: None    ROM:     Flexion: Normal    Extension: Normal     Lateral bending: Normal      Gait: Normal       Palpation:    L1-L5: No tenderness    Sacrum: No tenderness    Coccyx:  No tenderness    Left Paraspinal: No tenderness, palpable slight prominence    Right Paraspinal: No tenderness     Strength (0-5/5)    Hip Flexion:   Left: 5/5  Right: 5/5    Hip Extension:  Left: 5/5  Right: 5/5    Hip Abduction:  Left: 5/5  Right: 5/5    Hip Adduction:  Left: 5/5  Right: 5/5    Knee Extension:  Left: 5/5  Right: 5/5    Knee Flexion:   Left: 5/5  Right: 5/5    Ankle dorsiflexion:  Left: 5/5  Right: 5/5    Ankle plantarflexion:  Left: 5/5  Right: 5/5    Great toe extension:  Left: 5/5  Right: 5/5     Sensation: Intact, no deficits      DTR:    Patella:  Left: +2  Right: +2    Achilles:  Left: +2  Right: +2     Special test:    Straight leg: Left: Negative  Right: Negative    Ronalds: Left: Negative  Right: Negative    Piriformis: Left: Negative  Right: Negative              Assessment/ Plan:       ICD-10-CM ICD-9-CM    1. Acute bilateral low back pain without sciatica M54.5 724.2      338.19    2. Muscle spasm M62.838 728.85        Low back muscle tension, spasms likely. No signs of abnormal nodules or growths   Deep tissue massage suggested  R/o hematuria with ua    I have discussed the diagnosis with the patient and the intended plan as seen in the above orders. The patient has received an after-visit summary and questions were answered concerning future plans. Medication Side Effects and Warnings were discussed with patient.     Follow-up Disposition: Not on R Amrik Jeronimo MD

## 2017-06-01 NOTE — PATIENT INSTRUCTIONS
Back Care and Preventing Injuries: Care Instructions  Your Care Instructions  You can hurt your back doing many everyday activities: lifting a heavy box, bending down to garden, exercising at the gym, and even getting out of bed. But you can keep your back strong and healthy by doing some exercises. You also can follow a few tips for sitting, sleeping, and lifting to avoid hurting your back again. Talk to your doctor before you start an exercise program. Ask for help if you want to learn more about keeping your back healthy. Follow-up care is a key part of your treatment and safety. Be sure to make and go to all appointments, and call your doctor if you are having problems. It's also a good idea to know your test results and keep a list of the medicines you take. How can you care for yourself at home? · Stay at a healthy weight to avoid strain on your lower back. · Do not smoke. Smoking increases the risk of osteoporosis, which weakens the spine. If you need help quitting, talk to your doctor about stop-smoking programs and medicines. These can increase your chances of quitting for good. · Make sure you sleep in a position that maintains your back's normal curves and on a mattress that feels comfortable. Sleep on your side with a pillow between your knees, or sleep on your back with a pillow under your knees. These positions can reduce strain on your back. · When you get out of bed, lie on your side and bend both knees. Drop your feet over the edge of the bed as you push up with both arms. Scoot to the edge of the bed. Make sure your feet are in line with your rear end (buttocks), and then stand up. · If you must stand for a long time, put one foot on a stool, ledge, or box. Exercise to strengthen your back and other muscles  · Get at least 30 minutes of exercise on most days of the week. Walking is a good choice.  You also may want to do other activities, such as running, swimming, cycling, or playing tennis or team sports. · Stretch your back muscles. Here are few exercises to try:  Young Kamaljit on your back with your knees bent and your feet flat on the floor. Gently pull one bent knee to your chest. Put that foot back on the floor, and then pull the other knee to your chest. Hold for 15 to 30 seconds. Repeat 2 to 4 times. ¨ Do pelvic tilts. Lie on your back with your knees bent. Tighten your stomach muscles. Pull your belly button (navel) in and up toward your ribs. You should feel like your back is pressing to the floor and your hips and pelvis are slightly lifting off the floor. Hold for 6 seconds while breathing smoothly. · Keep your core muscles strong. The muscles of your back, belly (abdomen), and buttocks support your spine. ¨ Pull in your belly, and imagine pulling your navel toward your spine. Hold this for 6 seconds, then relax. Remember to keep breathing normally as you tense your muscles. ¨ Do curl-ups. Always do them with your knees bent. Keep your low back on the floor, and curl your shoulders toward your knees using a smooth, slow motion. Keep your arms folded across your chest. If this bothers your neck, try putting your hands behind your neck (not your head), with your elbows spread apart. ¨ Lie on your back with your knees bent and your feet flat on the floor. Tighten your belly muscles, and then push with your feet and raise your buttocks up a few inches. Hold this position 6 seconds as you continue to breathe normally, then lower yourself slowly to the floor. Repeat 8 to 12 times. ¨ If you like group exercise, try Pilates or yoga. These classes have poses that strengthen the core muscles. Protect your back when you sit  · Place a small pillow, a rolled-up towel, or a lumbar roll in the curve of your back if you need extra support. · Sit in a chair that is low enough to let you place both feet flat on the floor with both knees nearly level with your hips.  If your chair or desk is too high, use a foot rest to raise your knees. · When driving, keep your knees nearly level with your hips. Sit straight, and drive with both hands on the steering wheel. Your arms should be in a slightly bent position. · Try a kneeling chair, which helps tilt your hips forward. This takes pressure off your lower back. · Try sitting on an exercise ball. It can rock from side to side, which helps keep your back loose. Lift properly  · Squat down, bending at the hips and knees only. If you need to, put one knee to the floor and extend your other knee in front of you, bent at a right angle (half kneeling). · Press your chest straight forward. This helps keep your upper back straight while keeping a slight arch in your low back. · Hold the load as close to your body as possible, at the level of your navel. · Use your feet to change direction, taking small steps. · Lead with your hips as you change direction. Keep your shoulders in line with your hips as you move. Do not twist your body. · Set down your load carefully, squatting with your knees and hips only. When should you call for help? Watch closely for changes in your health, and be sure to contact your doctor if:  · You want more exercises to make your back and other core muscles stronger. Where can you learn more? Go to http://shadi-louie.info/. Enter S810 in the search box to learn more about \"Back Care and Preventing Injuries: Care Instructions. \"  Current as of: May 23, 2016  Content Version: 11.2  © 9119-8542 Healthwise, Incorporated. Care instructions adapted under license by [employer] (which disclaims liability or warranty for this information). If you have questions about a medical condition or this instruction, always ask your healthcare professional. [place] [address] any warranty or liability for your use of this information.        Low Back Pain: Exercises  Your Care Instructions  Here are some examples of typical rehabilitation exercises for your condition. Start each exercise slowly. Ease off the exercise if you start to have pain. Your doctor or physical therapist will tell you when you can start these exercises and which ones will work best for you. How to do the exercises  Press-up    1. Lie on your stomach, supporting your body with your forearms. 2. Press your elbows down into the floor to raise your upper back. As you do this, relax your stomach muscles and allow your back to arch without using your back muscles. As your press up, do not let your hips or pelvis come off the floor. 3. Hold for 15 to 30 seconds, then relax. 4. Repeat 2 to 4 times. Alternate arm and leg (bird dog) exercise    Note: Do this exercise slowly. Try to keep your body straight at all times, and do not let one hip drop lower than the other. 1. Start on the floor, on your hands and knees. 2. Tighten your belly muscles. 3. Raise one leg off the floor, and hold it straight out behind you. Be careful not to let your hip drop down, because that will twist your trunk. 4. Hold for about 6 seconds, then lower your leg and switch to the other leg. 5. Repeat 8 to 12 times on each leg. 6. Over time, work up to holding for 10 to 30 seconds each time. 7. If you feel stable and secure with your leg raised, try raising the opposite arm straight out in front of you at the same time. Knee-to-chest exercise    1. Lie on your back with your knees bent and your feet flat on the floor. 2. Bring one knee to your chest, keeping the other foot flat on the floor (or keeping the other leg straight, whichever feels better on your lower back). 3. Keep your lower back pressed to the floor. Hold for at least 15 to 30 seconds. 4. Relax, and lower the knee to the starting position. 5. Repeat with the other leg. Repeat 2 to 4 times with each leg.   6. To get more stretch, put your other leg flat on the floor while pulling your knee to your chest.  Curl-ups    1. Lie on the floor on your back with your knees bent at a 90-degree angle. Your feet should be flat on the floor, about 12 inches from your buttocks. 2. Cross your arms over your chest. If this bothers your neck, try putting your hands behind your neck (not your head), with your elbows spread apart. 3. Slowly tighten your belly muscles and raise your shoulder blades off the floor. 4. Keep your head in line with your body, and do not press your chin to your chest.  5. Hold this position for 1 or 2 seconds, then slowly lower yourself back down to the floor. 6. Repeat 8 to 12 times. Pelvic tilt exercise    1. Lie on your back with your knees bent. 2. \"Brace\" your stomach. This means to tighten your muscles by pulling in and imagining your belly button moving toward your spine. You should feel like your back is pressing to the floor and your hips and pelvis are rocking back. 3. Hold for about 6 seconds while you breathe smoothly. 4. Repeat 8 to 12 times. Heel dig bridging    1. Lie on your back with both knees bent and your ankles bent so that only your heels are digging into the floor. Your knees should be bent about 90 degrees. 2. Then push your heels into the floor, squeeze your buttocks, and lift your hips off the floor until your shoulders, hips, and knees are all in a straight line. 3. Hold for about 6 seconds as you continue to breathe normally, and then slowly lower your hips back down to the floor and rest for up to 10 seconds. 4. Do 8 to 12 repetitions. Hamstring stretch in doorway    1. Lie on your back in a doorway, with one leg through the open door. 2. Slide your leg up the wall to straighten your knee. You should feel a gentle stretch down the back of your leg. 3. Hold the stretch for at least 15 to 30 seconds. Do not arch your back, point your toes, or bend either knee. Keep one heel touching the floor and the other heel touching the wall.   4. Repeat with your other leg.  5. Do 2 to 4 times for each leg. Hip flexor stretch    1. Kneel on the floor with one knee bent and one leg behind you. Place your forward knee over your foot. Keep your other knee touching the floor. 2. Slowly push your hips forward until you feel a stretch in the upper thigh of your rear leg. 3. Hold the stretch for at least 15 to 30 seconds. Repeat with your other leg. 4. Do 2 to 4 times on each side. Wall sit    1. Stand with your back 10 to 12 inches away from a wall. 2. Lean into the wall until your back is flat against it. 3. Slowly slide down until your knees are slightly bent, pressing your lower back into the wall. 4. Hold for about 6 seconds, then slide back up the wall. 5. Repeat 8 to 12 times. Follow-up care is a key part of your treatment and safety. Be sure to make and go to all appointments, and call your doctor if you are having problems. It's also a good idea to know your test results and keep a list of the medicines you take. Where can you learn more? Go to http://shadi-louie.info/. Enter F329 in the search box to learn more about \"Low Back Pain: Exercises. \"  Current as of: May 23, 2016  Content Version: 11.2  © 1771-5467 [employer]. Care instructions adapted under license by [employer] (which disclaims liability or warranty for this information). If you have questions about a medical condition or this instruction, always ask your healthcare professional. [place] [address] any warranty or liability for your use of this information.

## 2017-06-01 NOTE — PROGRESS NOTES
1. Have you been to the ER, urgent care clinic since your last visit? Hospitalized since your last visit? No    2. Have you seen or consulted any other health care providers outside of the [address] [place] [address] since your last visit? Include any pap smears or colon screening. No     Chief Complaint   Patient presents with    Back Pain     Knots lower back bilateral, x2 weeks. Better with stretching, worse with massage/pressure. Not fasting.

## 2017-08-14 ENCOUNTER — TELEPHONE (OUTPATIENT)
Dept: INTERNAL MEDICINE CLINIC | Age: 32
End: 2017-08-14

## 2017-08-16 NOTE — TELEPHONE ENCOUNTER
This writer attempted to contact patient in reference to request for lab order. This writer was not able to reach patient at this time. A voicemail was left for patient to contact the office.

## 2017-09-05 ENCOUNTER — OFFICE VISIT (OUTPATIENT)
Dept: INTERNAL MEDICINE CLINIC | Age: 32
End: 2017-09-05

## 2017-09-05 VITALS
TEMPERATURE: 98 F | HEART RATE: 88 BPM | HEIGHT: 73 IN | OXYGEN SATURATION: 100 % | WEIGHT: 177 LBS | SYSTOLIC BLOOD PRESSURE: 133 MMHG | RESPIRATION RATE: 16 BRPM | DIASTOLIC BLOOD PRESSURE: 87 MMHG | BODY MASS INDEX: 23.46 KG/M2

## 2017-09-05 DIAGNOSIS — R03.0 ELEVATED BP WITHOUT DIAGNOSIS OF HYPERTENSION: Primary | ICD-10-CM

## 2017-09-05 DIAGNOSIS — Z31.41 ENCOUNTER FOR FERTILITY TESTING: ICD-10-CM

## 2017-09-05 NOTE — MR AVS SNAPSHOT
Visit Information Date & Time Provider Department Dept. Phone Encounter #  
 9/5/2017 11:15 AM [address] [place] [address] MD Dotty [facility]  [id number] Follow-up Instructions Return in about 6 months (around 3/5/2018) for Blood Pressure Check. Follow-up and Disposition History Upcoming Health Maintenance Date Due INFLUENZA AGE 9 TO ADULT 8/1/2017 DTaP/Tdap/Td series (2 - Td) 4/21/2027 Allergies as of 9/5/2017  Review Complete On: 9/5/2017 By: [facility] MD Dotty  
 No Known Allergies Current Immunizations  Never Reviewed Name Date Influenza Vaccine (Quad) PF 12/7/2016 Not reviewed this visit You Were Diagnosed With   
  
 Codes Comments Elevated BP without diagnosis of hypertension    -  Primary ICD-10-CM: R03.0 ICD-9-CM: 796.2 Encounter for fertility testing     ICD-10-CM: Z31.41 
ICD-9-CM: V26.21 Vitals BP Pulse Temp Resp Height(growth percentile) Weight(growth percentile) 133/87 (BP 1 Location: Left arm, BP Patient Position: Sitting) 88 98 °F (36.7 °C) (Oral) 16 6' 1\" (1.854 m) 177 lb (80.3 kg) SpO2 BMI Smoking Status 100% 23.35 kg/m2 Never Smoker Vitals History BMI and BSA Data Body Mass Index Body Surface Area  
 23.35 kg/m 2 2.03 m 2 Preferred Pharmacy Pharmacy Name Phone [id number] Cristopher Jomouth [phone] Your Updated Medication List  
  
   
This list is accurate as of: 9/5/17 12:00 PM.  Always use your most recent med list.  
  
  
  
  
 nystatin-triamcinolone 100,000-0.1 unit/gram-% ointment Commonly known as:  Si Kaushik Apply  to affected area two (2) times a day. Follow-up Instructions Return in about 6 months (around 3/5/2018) for Blood Pressure Check. Introducing [facility]! Dear Unique Briggs: Thank you for requesting a Sourcery account.   Our records indicate that you already have an active Lucid[employer] account. You can access your account anytime at [email or web address] Did you know that you can access your hospital and ER discharge instructions at any time in Lucidux? You can also review all of your test results from your hospital stay or ER visit. Additional Information If you have questions, please visit the Frequently Asked Questions section of the [employer] website at [email or web address] Remember, Lucidux is NOT to be used for urgent needs. For medical emergencies, dial 911. Now available from your iPhone and Android! Please provide this summary of care documentation to your next provider. Your primary care clinician is listed as Mathieu Dempsey. If you have any questions after today's visit, please call [phone].

## 2017-09-05 NOTE — PROGRESS NOTES
Chief Complaint   Patient presents with    Blood Pressure Check     4 month f/u     Pt who presents for new problem of hypertension. Diet and Lifestyle: generally follows a low fat low cholesterol diet  Home BP Monitoring: is not well controlled at home, ranging 133's/90's  Use of agents associated with hypertension: none. Cardiovascular ROS: no TIA's, no chest pain on exertion, no dyspnea on exertion, no swelling of ankles. New concerns: None. Reviewed and agree with Nurse Note and duplicated in this note. Reviewed PmHx, RxHx, FmHx, SocHx, AllgHx and updated and dated in the chart. Family History   Problem Relation Age of Onset    Hypertension Mother      No past medical history on file.    Social History     Social History    Marital status:      Spouse name: N/A    Number of children: N/A    Years of education: N/A     Social History Main Topics    Smoking status: Never Smoker    Smokeless tobacco: Never Used    Alcohol use Yes      Comment: average 1 beer/day or less    Drug use: No    Sexual activity: Not on file     Other Topics Concern    Not on file     Social History Narrative        Review of Systems - negative except as listed above      Objective:     Vitals:    09/05/17 1126   BP: (!) 139/96   Pulse: 68   Resp: 16   Temp: 98 °F (36.7 °C)   TempSrc: Oral   SpO2: 100%   Weight: 177 lb (80.3 kg)   Height: 6' 1\" (1.854 m)       Physical Examination: General appearance - alert, well appearing, and in no distress  Eyes - pupils equal and reactive, extraocular eye movements intact  Ears - bilateral TM's and external ear canals normal  Nose - normal and patent, no erythema, discharge or polyps  Mouth - mucous membranes moist, pharynx normal without lesions  Neck - supple, no significant adenopathy  Chest - clear to auscultation, no wheezes, rales or rhonchi, symmetric air entry  Heart - normal rate, regular rhythm, normal S1, S2, no murmurs, rubs, clicks or gallops  Abdomen - soft, nontender, nondistended, no masses or organomegaly  Back exam - full range of motion, no tenderness, palpable spasm or pain on motion  Neurological - alert, oriented, normal speech, no focal findings or movement disorder noted  Musculoskeletal - no joint tenderness, deformity or swelling  Extremities - peripheral pulses normal, no pedal edema, no clubbing or cyanosis  Skin - normal coloration and turgor, no rashes, no suspicious skin lesions noted    Assessment/ Plan:   Diagnoses and all orders for this visit:    1. Elevated BP without diagnosis of hypertension  Patient's home BPs are ranging in the low 130s-140s over 80, will continue to monitor at home and see if there is any spikes in blood pressure  Continue diet control  2. Encounter for fertility testing  Referral given for [facility] for semen test     Follow-up Disposition:  Return in about 6 months (around 3/5/2018) for Blood Pressure Check. I have discussed the diagnosis with the patient and the intended plan as seen in the above orders. The patient has received an after-visit summary and questions were answered concerning future plans. Medication Side Effects and Warnings were discussed with patient: yes  Patient Labs were reviewed and or requested: yes  Patient Past Records were reviewed and or requested  yes  I have discussed the diagnosis with the patient and the intended plan as seen in the above orders. The patient has received an after-visit summary and questions were answered concerning future plans. Pt agrees to call or return to clinic and/or go to closest ER with any worsening of symptoms. This may include, but not limited to increased fever (>100.4) with NSAIDS or Tylenol, increased edema, confusion, rash, worsening of presenting symptoms.     Patient was informed/counseled to:    1) Remember to stay active and/or exercise regularly (I suggest 30-45 minutes daily)   2) For reliable dietary information, go to www.EATRIGHT.org. You may wish to consider seeing the nutritionist at [facility] at #[phone] or [phone], also consider the [address]   3) I routinely suggest a complete physical exam once each year (your birth month)

## 2017-09-29 ENCOUNTER — TELEPHONE (OUTPATIENT)
Dept: INTERNAL MEDICINE CLINIC | Age: 32
End: 2017-09-29

## 2017-09-29 NOTE — TELEPHONE ENCOUNTER
Returned call and discussed having the patient call [facility]. Patient verbalized understanding.

## 2017-10-12 ENCOUNTER — OFFICE VISIT (OUTPATIENT)
Dept: INTERNAL MEDICINE CLINIC | Age: 32
End: 2017-10-12

## 2017-10-12 VITALS
SYSTOLIC BLOOD PRESSURE: 148 MMHG | BODY MASS INDEX: 23.46 KG/M2 | WEIGHT: 177 LBS | RESPIRATION RATE: 18 BRPM | OXYGEN SATURATION: 100 % | HEIGHT: 73 IN | DIASTOLIC BLOOD PRESSURE: 103 MMHG | HEART RATE: 78 BPM

## 2017-10-12 DIAGNOSIS — Z23 ENCOUNTER FOR IMMUNIZATION: ICD-10-CM

## 2017-10-12 DIAGNOSIS — F41.9 ANXIETY: Primary | ICD-10-CM

## 2017-10-12 NOTE — PROGRESS NOTES
Chief Complaint   Patient presents with    Anxiety     he is a 28y.o. year old male who presents for evaluation of   Anxiety and/or Depression  Ongoing symptoms include: sweating, shortness of breath, racing thoughts, feelings of losing control, difficulty concentrating. Symptoms for many years  Patient denies: suicidal ideation, homocidal ideation. Reported side effects from the treatment: none. Reviewed and agree with Nurse Note and duplicated in this note. Reviewed PmHx, RxHx, FmHx, SocHx, AllgHx and updated and dated in the chart. Family History   Problem Relation Age of Onset    Hypertension Mother      No past medical history on file.    Social History     Social History    Marital status:      Spouse name: N/A    Number of children: N/A    Years of education: N/A     Social History Main Topics    Smoking status: Never Smoker    Smokeless tobacco: Never Used    Alcohol use Yes      Comment: average 1 beer/day or less    Drug use: No    Sexual activity: Not on file     Other Topics Concern    Not on file     Social History Narrative        Review of Systems - negative except as listed above      Objective:     Vitals:    10/12/17 1607 10/12/17 1612   BP: (!) 153/104 (!) 148/103   Pulse: 78    Resp: 18    SpO2: 100%    Weight: 177 lb (80.3 kg)    Height: 6' 1\" (1.854 m)        Physical Examination: General appearance - alert, well appearing, and in no distress  Eyes - pupils equal and reactive, extraocular eye movements intact  Ears - bilateral TM's and external ear canals normal  Nose - normal and patent, no erythema, discharge or polyps  Mouth - mucous membranes moist, pharynx normal without lesions  Neck - supple, no significant adenopathy  Chest - clear to auscultation, no wheezes, rales or rhonchi, symmetric air entry  Heart - normal rate, regular rhythm, normal S1, S2, no murmurs, rubs, clicks or gallops  Abdomen - soft, nontender, nondistended, no masses or organomegaly  Musculoskeletal - no joint tenderness, deformity or swelling  Extremities - peripheral pulses normal, no pedal edema, no clubbing or cyanosis  Skin - normal coloration and turgor, no rashes, no suspicious skin lesions noted    Assessment/ Plan:   Diagnoses and all orders for this visit:    1. Anxiety  -     REFERRAL TO PSYCHOLOGY  -     [facility] 3RD GENERATION    2. Encounter for immunization  -     INFLUENZA VIRUS VACCINE QUADRIVALENT, PRESERVATIVE FREE SYRINGE ([id number])       Follow-up Disposition:  Return in about 4 weeks (around 11/9/2017) for anxiety. I have discussed the diagnosis with the patient and the intended plan as seen in the above orders. The patient has received an after-visit summary and questions were answered concerning future plans. Medication Side Effects and Warnings were discussed with patient: yes  Patient Labs were reviewed and or requested: yes  Patient Past Records were reviewed and or requested  yes  I have discussed the diagnosis with the patient and the intended plan as seen in the above orders. The patient has received an after-visit summary and questions were answered concerning future plans. Pt agrees to call or return to clinic and/or go to closest ER with any worsening of symptoms. This may include, but not limited to increased fever (>100.4) with NSAIDS or Tylenol, increased edema, confusion, rash, worsening of presenting symptoms. 1) Remember to stay active and/or exercise regularly (I suggest 30-45 minutes daily)   2) For reliable dietary information, go to www. EATRIGHT.org. You may wish to consider seeing the nutritionist at [facility] at #[phone] or [phone], also consider the [address]   3) I routinely suggest a complete physical exam once each year (your birth month)

## 2017-10-12 NOTE — MR AVS SNAPSHOT
Visit Information Date & Time Provider Department Dept. Phone Encounter #  
 10/12/2017  4:00 PM [facility] MD Anuja Stormbeatriz Lopez Sports Medicine and Primary Care [phone] [id number] Follow-up Instructions Return in about 4 weeks (around 11/9/2017) for anxiety. Your Appointments 11/9/2017  4:00 PM  
Any with [facility] MD Dotty  
[address] [place] [address] and Primary Care [facility]) Juan Manuel Hernandez [address] [facility]  
  
   
 [phone] [id number] Upcoming Health Maintenance Date Due DTaP/Tdap/Td series (2 - Td) 4/21/2027 Allergies as of 10/12/2017  Review Complete On: 10/12/2017 By: [address] [place] [address] MD Dotty  
 No Known Allergies Current Immunizations  Never Reviewed Name Date Influenza Vaccine (Quad) PF  Incomplete, 12/7/2016 Not reviewed this visit You Were Diagnosed With   
  
 Codes Comments Anxiety    -  Primary ICD-10-CM: F41.9 ICD-9-CM: 300.00 Encounter for immunization     ICD-10-CM: N65 ICD-9-CM: V03.89 Vitals BP Pulse Resp Height(growth percentile) Weight(growth percentile) SpO2  
 (!) 148/103 (BP 1 Location: Left arm, BP Patient Position: Sitting) 78 18 6' 1\" (1.854 m) 177 lb (80.3 kg) 100% BMI Smoking Status 23.35 kg/m2 Never Smoker Vitals History BMI and BSA Data Body Mass Index Body Surface Area  
 23.35 kg/m 2 2.03 m 2 Preferred Pharmacy Pharmacy Name Phone [id number] Melina Morel [phone] Your Updated Medication List  
  
   
This list is accurate as of: 10/12/17  4:52 PM.  Always use your most recent med list.  
  
  
  
  
 nystatin-triamcinolone 100,000-0.1 unit/gram-% ointment Commonly known as:  Cookie Torres Apply  to affected area two (2) times a day. We Performed the Following INFLUENZA VIRUS VAC QUAD,SPLIT,PRESV FREE SYRINGE IM [id number] CPT(R)] REFERRAL TO PSYCHOLOGY [ZXQ38 Custom] [facility] 3RD GENERATION [55426 CPT(R)] Follow-up Instructions Return in about 4 weeks (around 11/9/2017) for anxiety. Referral Information Referral ID Referred By Referred To  
  
 [id number] Santino EVERETT Not Available Visits Status Start Date End Date 1 New Request 10/12/17 10/12/18 If your referral has a status of pending review or denied, additional information will be sent to support the outcome of this decision. Patient Instructions Adults: For nasal congestion, cough and cold/flu symptoms I advised: - Seek medical care if symptoms become more severe or if you develop  
   chest pain, shortness of breath, confusion. 
  - Contact us if your symptoms fail to improve after 7-10 days - Rest as much as possible and stay home from work/school at least 24 hours  
               after last fever - Wash hand frequently and cough/sneeze into your sleeve to help prevent  
    infection of others - Drink plenty of fluids - Ibuprofen (Advil, Motrin) 400-800mg every 6 hours or Aleve 220 mg 1-2 pills every 8 hours for fever, headache, pain - Tylenol extra strength 500 mg every 6 hours for pain, headache, fever 
 - Nasal saline rinses 2-3 times daily for nasal congestion - Mucinex 1200 mg twice daily or Guaifenesin 400 mg every 4 hours for chest  
    congestion - Robitussin DM or Delsym for cough(suppress cough and thin mucus. ) 
 - Cepacol throat lozenges and saline gargles (1 tsp salt in 8 oz water) for sore  
    throat - Tea with honey for cough (buckwheat honey preferred) - Benadryl (diphenhydramine) 50 mg at night for nasal congestion/allergies - Pseudoephedrine 12-hour tablets twice daily for nasal and inner ear    
  -Ask your pharmacist (this is kept behind the counter) -If you have high blood pressure or heart disease, use this medication with caution (ask your doctor), alternative coricidin - Afrin (oxymetazoline) nasal spray 2 sprays in each nostril twice daily for severe  
   congestion.   
   -Do not use this medication for more than 3 days as it may cause \"rebound congestion\". -If you have high blood pressure or heart disease, use this medication  
    with caution (ask your doctor) Introducing [facility] SERVICES! Dear Comfort Rodriguez: Thank you for requesting a [employer] account. Our records indicate that you already have an active [employer] account. You can access your account anytime at [email or web address] Did you know that you can access your hospital and ER discharge instructions at any time in Enohm? You can also review all of your test results from your hospital stay or ER visit. Additional Information If you have questions, please visit the Frequently Asked Questions section of the [employer] website at [email or web address] Remember, [employer]m is NOT to be used for urgent needs. For medical emergencies, dial 911. Now available from your iPhone and Android! Please provide this summary of care documentation to your next provider. Your primary care clinician is listed as Cornell Foster. If you have any questions after today's visit, please call [phone].

## 2017-10-14 LAB — TSH SERPL DL<=0.005 MIU/L-ACNC: 0.96 UIU/ML (ref 0.45–4.5)

## 2017-10-18 ENCOUNTER — TELEPHONE (OUTPATIENT)
Dept: INTERNAL MEDICINE CLINIC | Age: 32
End: 2017-10-18

## 2017-10-19 NOTE — TELEPHONE ENCOUNTER
Patient returned call to inform us that the psychologist he was referred to will not have any openings until December. Directed patient to several other psychologists he could call and ask if they have openings. Patient will call back with a name to refer him to if needed.

## 2017-10-26 ENCOUNTER — OFFICE VISIT (OUTPATIENT)
Dept: BEHAVIORAL/MENTAL HEALTH CLINIC | Age: 32
End: 2017-10-26

## 2017-10-26 VITALS
SYSTOLIC BLOOD PRESSURE: 182 MMHG | HEART RATE: 84 BPM | DIASTOLIC BLOOD PRESSURE: 112 MMHG | OXYGEN SATURATION: 98 % | HEIGHT: 73 IN | WEIGHT: 177 LBS | BODY MASS INDEX: 23.46 KG/M2

## 2017-10-26 DIAGNOSIS — F43.22 ADJUSTMENT DISORDER WITH ANXIETY: Primary | ICD-10-CM

## 2017-10-26 RX ORDER — BUSPIRONE HYDROCHLORIDE 7.5 MG/1
7.5 TABLET ORAL 2 TIMES DAILY
Qty: 60 TAB | Refills: 0 | Status: SHIPPED | OUTPATIENT
Start: 2017-10-26 | End: 2017-11-16 | Stop reason: SDUPTHER

## 2017-10-26 NOTE — PATIENT INSTRUCTIONS
For reliable dietary information, go to www. EATRIGHT.org. Sleep Tips    What to avoid    · Do not have drinks with caffeine, such as coffee or black tea, for 8 hours before bed. · Do not smoke or use other types of tobacco near bedtime. Nicotine is a stimulant and can keep you awake. · Avoid drinking alcohol late in the evening, because it can cause you to wake in the middle of the night. · Do not eat a big meal close to bedtime. If you are hungry, eat a light snack. · Do not drink a lot of water close to bedtime, because the need to urinate may wake you up during the night. · Do not read or watch TV in bed. Use the bed only for sleeping and sexual activity. What to try    · Go to bed at the same time every night, and wake up at the same time every morning. Do not take naps during the day. · Keep your bedroom quiet, dark, and cool. · Get regular exercise, but not within 3 to 4 hours of your bedtime. · Sleep on a comfortable pillow and mattress. · If watching the clock makes you anxious, turn it facing away from you so you cannot see the time. · If you worry when you lie down, start a worry book. Well before bedtime, write down your worries, and then set the book and your concerns aside. · Try meditation or other relaxation techniques before you go to bed. · If you cannot fall asleep, get up and go to another room until you feel sleepy. Do something relaxing. Repeat your bedtime routine before you go to bed again. Make your house quiet and calm about an hour before bedtime. Turn down the lights, turn off the TV, log off the computer, and turn down the volume on music. This can help you relax after a busy day. Adjustment Disorder: Care Instructions  Your Care Instructions    Adjustment disorder means that you have emotional or behavioral problems because of stress. But your response to the stress is far more severe than a normal response.  It is severe enough to affect your work or social life and may cause depression and physical pains and problems. Events that may cause this response can include a divorce, money problems, or starting school or a new job. It might be anything that causes some stress. This disorder is most often a short-term problem. It happens within 3 months of the stressful event or change. If the response lasts longer than 6 months after the event ends, you may have a more serious disorder. Follow-up care is a key part of your treatment and safety. Be sure to make and go to all appointments, and call your doctor if you are having problems. It's also a good idea to know your test results and keep a list of the medicines you take. How can you care for yourself at home? · Go to all counseling sessions. Do not skip any because you are feeling better. · If your doctor prescribed medicines, take them exactly as prescribed. Call your doctor if you think you are having a problem with your medicine. You will get more details on the specific medicines your doctor prescribes. · Discuss the causes of your stress with a good friend or family member. Or you can join a support group for people with similar problems. Talking to others sometimes relieves stress. · Get at least 30 minutes of exercise on most days of the week. Walking is a good choice. You also may want to do other activities, such as running, swimming, cycling, or playing tennis or team sports. Relaxation techniques  Do relaxation exercises 10 to 20 minutes a day. You can play soothing, relaxing music while you do them, if you wish. · Tell others in your house that you are going to do your relaxation exercises. Ask them not to disturb you. · Find a comfortable, quiet place. · Lie down on your back, or sit with your back straight. · Focus on your breathing. Make it slow and steady. · Breathe in through your nose. Breathe out through either your nose or mouth.   · Breathe deeply, filling up the area between your navel and your rib cage. Breathe so that your belly goes up and down. · Do not hold your breath. · Breathe like this for 5 to 10 minutes. Notice the feeling of calmness throughout your whole body. As you continue to breathe slowly and deeply, relax by doing these next steps for another 5 to 10 minutes:  · Tighten and relax each muscle group in your body. Start at your toes, and work your way up to your head. · Imagine your muscle groups relaxing and getting heavy. · Empty your mind of all thoughts. · Let yourself relax more and more deeply. · Be aware of the state of calmness that surrounds you. · When your relaxation time is over, you can bring yourself back to alertness by moving your fingers and toes. Then move your hands and feet. And then move your entire body. Sometimes people fall asleep during relaxation. But they most often wake up soon. · Always give yourself time to return to full alertness before you drive a car. Wait to do anything that might cause an accident if you are not fully alert. Never play a relaxation tape while you drive a car. When should you call for help? Call 911 anytime you think you may need emergency care. For example, call if:  ? · You feel you cannot stop from hurting yourself or someone else. Keep the numbers for these national suicide hotlines: 4-466-668-TALK (6-396.844.7788) and 2-381-IQHUTCG (4-342.254.4527). If you or someone you know talks about suicide or feeling hopeless, get help right away. ? Watch closely for changes in your health, and be sure to contact your doctor if:  ? · You have new anxiety, or your anxiety gets worse. ? · You have been feeling sad, depressed, or hopeless or have lost interest in things that you usually enjoy. ? · You do not get better as expected. Where can you learn more? Go to http://shadi-louie.info/. Enter [id number] [id number] in the search box to learn more about \"Adjustment Disorder: Care Instructions. \"  Current as of: July 26, 2016  Content Version: 11.4  © 1615-9074 Healthwise[employer]orporated. Care instructions adapted under license by [employer] (which disclaims liability or warranty for this information). If you have questions about a medical condition or this instruction, always ask your healthcare professional. [address] any warranty or liability for your use of this information.

## 2017-10-26 NOTE — PROGRESS NOTES
Ambulatory Initial Psychiatric Evaluation     Chief Complaint: \"anxiety \"    History of Present Illness: Erick Choi is a 28 y.o. male who presents with symptoms of anxiety     Patient reports/evidences the following emotional symptoms:  sleeping for 7-8 hrs and denied difficulty initiating and maintaining sleep. Reported anxiety with increased stressors and change in life events. He worries a lot of trivial issues. Reported feels stressors to try to have a child. Reported has interest, has fair appetite, has energy, motivation and focus and concentrate. Denied hopelessness or helpelesness or passive suicide thoughts. Is happy in marriage. Denied any symptoms os psychosis or vangie. Additional symptomatology include anxiety. Reported increased heart rate, tightness in chest. He has been tested for sperm count. Had occasional difficulty performing due to anxiety. The above symptoms have been present for a few months since they are planning to have a baby. The patient reports onset of symptoms since September. Reporetd has anxiety since young age. These symptoms are of high severity as per patient's report. The symptoms are variable in nature. The patient's condition has been precipitated by and condition worsened with stressors. Stressors: trying to have a baby, stressful job. Pt denied any flashbacks, hypervigilance or avoidance or reexperience or night kidd. Pt denied any h/o seizures or head trauma or neurological problems. Client denied any SI or any plan or intent; denied HI or SIB. There is no evidence of hallucinations, psychosis or vangie.      Past Psychiatric History:     Previous psychiatric care: denies      Previous suicide attempts: denied    Previous self injurious behavior: No    Previous Psych Hospitalizations: denied    Current psychotropics: denied          Previous psychiatric medications/ECT/TMS: denies            Past history of SA,rehab, detox, withdrawal: denied    Social History: Social History     Social History    Marital status:      Spouse name: N/A    Number of children: N/A    Years of education: N/A     Social History Main Topics    Smoking status: Never Smoker    Smokeless tobacco: Never Used    Alcohol use Yes      Comment: average 1 beer/day or less    Drug use: No    Sexual activity: Not on file     Other Topics Concern    Not on file     Social History Narrative        Ethnic:   Relationship Status:   Kids: none  Living Situation: With family   Born: [place]  Raised by: parents  Siblings: 2 younger brothers  Education: B.Sc. 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 There is no history of suicide attempt in the family. Past Medical/Surgical History:   No past medical history on file. Allergies:   No Known Allergies     Medication List:   Current Outpatient Prescriptions   Medication Sig Dispense Refill    nystatin-triamcinolone (MYCOLOG) 100,000-0.1 unit/gram-% ointment Apply  to affected area two (2) times a day. 30 g 0        A comprehensive review of systems was negative except for that written in the HPI.     Psychiatric/Mental Status Examination:     MENTAL STATUS EXAM:  Sensorium  oriented to time, place and person   Orientation person, place, time/date, situation, day of week, month of year and year   Relations cooperative   Eye Contact appropriate   Appearance:  age appropriate, well dressed and within normal Limits   Motor Behavior:  gait stable and within normal limits   Speech:  normal pitch and normal volume   Vocabulary above average   Thought Process: goal directed, logical and within normal limits   Thought Content free of delusions and free of hallucinations   Suicidal ideations no plan , no intention and none   Homicidal ideations no plan , no intention and none   Mood:  anxious   Affect:  anxious and mood-congruent   Memory recent  adequate   Memory remote:  adequate   Concentration:  adequate   Abstraction:  abstract   Insight:  fair   Reliability fair   Judgment:  fair     Labs:  Results for orders placed or performed in visit on 10/12/17   TSH 3RD GENERATION   Result Value Ref Range    TSH 0.962 0.450 - 4.500 uIU/mL         Assessment:  The client, Sylvester Peterson is a 28 y.o.  male presents with anxiety. Medical h/o HT possibly dur to stress. . This is his first interaction with behavioral health. Client reported worries a lot all his life. Client reported that he has a stressful job and has high expectation with himself. Client reported has a current demanding job and is challenging job. Client is happily  and trying to have baby and client is very anxious about it and had occasional performance anxiety. Reported feels tightness in chest and difficulty breathing during anxiety. Denied any symptoms of depression. Has energy, interest, motivation and able to focus and concentrate. Client reported that he does not want to be on benzodiazepines and will come positive in drug screen at work. Plan to begin Buspar to target anxiety. Has occasional elevated B.P. Discussed importance of psychotherapy in anxiety. Encouraged to exercise and relaxation techniques. Reviewed labs and records. Patient denies SI/HI/SIB. No evidence of AH/VH or delusions. Possible organic causes contributing are:none  PHQ 9 score: 3    Diagnosis: adjustment disorder with anxiety, generalized anxiety disorder    Treatment Plan:   1.  Medication: begin buspar 7.5 BID    2. Discussed:  the risks and benefits of the proposed medication  the potential medication side effects  dry mouth, GI disturbance, insomnia, libido decreased, weight gain, somnolence  patient given opportunity to ask questions     3. Psychotherapy: Recommended: CBT- gave a list of therapists  4. Medical: PCP  5. Return to Clinic: Follow-up Disposition: Not on File or sooner prn    The risk versus benefits of treatment were discussed and side effects explained. Patient agreed with plan. Patient instructed to call with any side effects.   - Instructed patient to call the clinic, and if after hours call the provider on call if experiences any suicidal thought or ideas to hurt herself or other. Also instructed to call 911 or go to the ED. Patient verbalized understanding and agreed to call.       Time spent with Patient:  30 to 74 minutes    Shaina Sheth NP  10/26/2017

## 2017-10-26 NOTE — MR AVS SNAPSHOT
Visit Information Date & Time Provider Department Dept. Phone Encounter #  
 10/26/2017  1:00 PM [facility] [phone] [id number] Follow-up Instructions Return in about 4 weeks (around 11/23/2017) for med check and follow up. Your Appointments 11/9/2017  4:00 PM  
Any with Vera Ramírez MD  
[address] [place] [address] and Primary Care [facility]) Appt Note: follow up 4wks anxiety [address] [place]  
  
   
 [address] [id number] Upcoming Health Maintenance Date Due DTaP/Tdap/Td series (2 - Td) 4/21/2027 Allergies as of 10/26/2017  Review Complete On: 10/26/2017 By: Gaetano Edmonds CNA No Known Allergies Current Immunizations  Never Reviewed Name Date Influenza Vaccine (Quad) PF 10/13/2017, 12/7/2016 Not reviewed this visit You Were Diagnosed With   
  
 Codes Comments Adjustment disorder with anxiety    -  Primary ICD-10-CM: G95.75 
ICD-9-CM: 309.24 Vitals BP Pulse Height(growth percentile) Weight(growth percentile) SpO2 BMI  
 (!) 182/112 (BP 1 Location: Left arm, BP Patient Position: Sitting) 84 6' 1\" (1.854 m) 177 lb (80.3 kg) 98% 23.35 kg/m2 Smoking Status Never Smoker Vitals History BMI and BSA Data Body Mass Index Body Surface Area  
 23.35 kg/m 2 2.03 m 2 Preferred Pharmacy Pharmacy Name Phone [id number] [phone] Your Updated Medication List  
  
   
This list is accurate as of: 10/26/17  1:43 PM.  Always use your most recent med list.  
  
  
  
  
 busPIRone 7.5 mg tablet Commonly known as:  BUSPAR Take 1 Tab by mouth two (2) times a day. nystatin-triamcinolone 100,000-0.1 unit/gram-% ointment Commonly known as:  Mallie Angle Apply  to affected area two (2) times a day. Prescriptions Sent to Pharmacy Refills  
 busPIRone (BUSPAR) 7.5 mg tablet 0 Sig: Take 1 Tab by mouth two (2) times a day. Class: Normal  
 Pharmacy: [address] Ph #: [phone] Route: Oral  
  
Follow-up Instructions Return in about 4 weeks (around 11/23/2017) for med check and follow up. Patient Instructions For reliable dietary information, go to www. EATRIGHT.org. Sleep Tips What to avoid   
· Do not have drinks with caffeine, such as coffee or black tea, for 8 hours before bed. · Do not smoke or use other types of tobacco near bedtime. Nicotine is a stimulant and can keep you awake. · Avoid drinking alcohol late in the evening, because it can cause you to wake in the middle of the night. · Do not eat a big meal close to bedtime. If you are hungry, eat a light snack. · Do not drink a lot of water close to bedtime, because the need to urinate may wake you up during the night. · Do not read or watch TV in bed. Use the bed only for sleeping and sexual activity. What to try   
· Go to bed at the same time every night, and wake up at the same time every morning. Do not take naps during the day. · Keep your bedroom quiet, dark, and cool. · Get regular exercise, but not within 3 to 4 hours of your bedtime. · Sleep on a comfortable pillow and mattress. · If watching the clock makes you anxious, turn it facing away from you so you cannot see the time. · If you worry when you lie down, start a worry book. Well before bedtime, write down your worries, and then set the book and your concerns aside. · Try meditation or other relaxation techniques before you go to bed. · If you cannot fall asleep, get up and go to another room until you feel sleepy. Do something relaxing. Repeat your bedtime routine before you go to bed again. Make your house quiet and calm about an hour before bedtime.  Turn down the lights, turn off the TV, log off the computer, and turn down the volume on music. This can help you relax after a busy day. Adjustment Disorder: Care Instructions Your Care Instructions Adjustment disorder means that you have emotional or behavioral problems because of stress. But your response to the stress is far more severe than a normal response. It is severe enough to affect your work or social life and may cause depression and physical pains and problems. Events that may cause this response can include a divorce, money problems, or starting school or a new job. It might be anything that causes some stress. This disorder is most often a short-term problem. It happens within 3 months of the stressful event or change. If the response lasts longer than 6 months after the event ends, you may have a more serious disorder. Follow-up care is a key part of your treatment and safety. Be sure to make and go to all appointments, and call your doctor if you are having problems. It's also a good idea to know your test results and keep a list of the medicines you take. How can you care for yourself at home? · Go to all counseling sessions. Do not skip any because you are feeling better. · If your doctor prescribed medicines, take them exactly as prescribed. Call your doctor if you think you are having a problem with your medicine. You will get more details on the specific medicines your doctor prescribes. · Discuss the causes of your stress with a good friend or family member. Or you can join a support group for people with similar problems. Talking to others sometimes relieves stress. · Get at least 30 minutes of exercise on most days of the week. Walking is a good choice. You also may want to do other activities, such as running, swimming, cycling, or playing tennis or team sports. Relaxation techniques Do relaxation exercises 10 to 20 minutes a day.  You can play soothing, relaxing music while you do them, if you wish. · Tell others in your house that you are going to do your relaxation exercises. Ask them not to disturb you. · Find a comfortable, quiet place. · Lie down on your back, or sit with your back straight. · Focus on your breathing. Make it slow and steady. · Breathe in through your nose. Breathe out through either your nose or mouth. · Breathe deeply, filling up the area between your navel and your rib cage. Breathe so that your belly goes up and down. · Do not hold your breath. · Breathe like this for 5 to 10 minutes. Notice the feeling of calmness throughout your whole body. As you continue to breathe slowly and deeply, relax by doing these next steps for another 5 to 10 minutes: · Tighten and relax each muscle group in your body. Start at your toes, and work your way up to your head. · Imagine your muscle groups relaxing and getting heavy. · Empty your mind of all thoughts. · Let yourself relax more and more deeply. · Be aware of the state of calmness that surrounds you. · When your relaxation time is over, you can bring yourself back to alertness by moving your fingers and toes. Then move your hands and feet. And then move your entire body. Sometimes people fall asleep during relaxation. But they most often wake up soon. · Always give yourself time to return to full alertness before you drive a car. Wait to do anything that might cause an accident if you are not fully alert. Never play a relaxation tape while you drive a car. When should you call for help? Call 911 anytime you think you may need emergency care. For example, call if: 
? · You feel you cannot stop from hurting yourself or someone else. Keep the numbers for these national suicide hotlines: 6-144-427-TALK (1-608.324.8699) and 3-582-GATCEDS (8-156.501.9838). If you or someone you know talks about suicide or feeling hopeless, get help right away. ?Watch closely for changes in your health, and be sure to contact your doctor if: 
? · You have new anxiety, or your anxiety gets worse. ? · You have been feeling sad, depressed, or hopeless or have lost interest in things that you usually enjoy. ? · You do not get better as expected. Where can you learn more? Go to http://shadi-louie.info/. Enter [id number] in the search box to learn more about \"Adjustment Disorder: Care Instructions. \" Current as of: July 26, 2016 Content Version: 11.4 © 6873-8070 Staff Ranker. Care instructions adapted under license by [employer] (which disclaims liability or warranty for this information). If you have questions about a medical condition or this instruction, always ask your healthcare professional. [address] any warranty or liability for your use of this information. Introducing [facility] & HEALTH SERVICES! Dear Lizbet Hahn: Thank you for requesting a [email or web address] account. Our records indicate that you already have an active [email or web address] account. You can access your account anytime at [email or web address] Did you know that you can access your hospital and ER discharge instructions at any time in [email or web address]? You can also review all of your test results from your hospital stay or ER visit. Additional Information If you have questions, please visit the Frequently Asked Questions section of the [email or web address] website at [email or web address] Remember, [email or web address] is NOT to be used for urgent needs. For medical emergencies, dial 911. Now available from your iPhone and Android! Please provide this summary of care documentation to your next provider. Your primary care clinician is listed as Elza Shannon. If you have any questions after today's visit, please call [phone].

## 2017-11-09 ENCOUNTER — OFFICE VISIT (OUTPATIENT)
Dept: INTERNAL MEDICINE CLINIC | Age: 32
End: 2017-11-09

## 2017-11-09 VITALS
HEIGHT: 73 IN | DIASTOLIC BLOOD PRESSURE: 112 MMHG | TEMPERATURE: 98.6 F | OXYGEN SATURATION: 100 % | BODY MASS INDEX: 23.7 KG/M2 | RESPIRATION RATE: 15 BRPM | SYSTOLIC BLOOD PRESSURE: 160 MMHG | WEIGHT: 178.8 LBS | HEART RATE: 86 BPM

## 2017-11-09 DIAGNOSIS — F41.9 ANXIETY: Primary | ICD-10-CM

## 2017-11-09 DIAGNOSIS — I10 WHITE COAT SYNDROME WITH HYPERTENSION: ICD-10-CM

## 2017-11-09 NOTE — PROGRESS NOTES
Chief Complaint   Patient presents with    Anxiety     he is a 28y.o. year old male who presents for follow-up of   Anxiety and/or Depression  Well controlled on buspar and no other therapies. Patient does have a psychology counseling appointment coming up at the end of the month. His follow-up with psychiatry as next week. Ongoing symptoms include: none. Patient denies: none. Reported side effects from the treatment: none. Reviewed and agree with Nurse Note and duplicated in this note. Reviewed PmHx, RxHx, FmHx, SocHx, AllgHx and updated and dated in the chart. Family History   Problem Relation Age of Onset    Hypertension Mother      No past medical history on file.    Social History     Social History    Marital status:      Spouse name: N/A    Number of children: N/A    Years of education: N/A     Social History Main Topics    Smoking status: Never Smoker    Smokeless tobacco: Never Used    Alcohol use Yes      Comment: average 1 beer/day or less    Drug use: No    Sexual activity: Not Asked     Other Topics Concern    None     Social History Narrative        Review of Systems - negative except as listed above      Objective:     Vitals:    11/09/17 1604   Weight: 178 lb 12.8 oz (81.1 kg)   Height: 6' 1\" (1.854 m)       Physical Examination: General appearance - alert, well appearing, and in no distress  Eyes - pupils equal and reactive, extraocular eye movements intact  Ears - bilateral TM's and external ear canals normal  Nose - normal and patent, no erythema, discharge or polyps  Mouth - mucous membranes moist, pharynx normal without lesions  Neck - supple, no significant adenopathy  Chest - clear to auscultation, no wheezes, rales or rhonchi, symmetric air entry  Heart - normal rate, regular rhythm, normal S1, S2, no murmurs, rubs, clicks or gallops  Abdomen - soft, nontender, nondistended, no masses or organomegaly  Neurological - alert, oriented, normal speech, no focal findings or movement disorder noted  Musculoskeletal - no joint tenderness, deformity or swelling  Extremities - peripheral pulses normal, no pedal edema, no clubbing or cyanosis  Skin - normal coloration and turgor, no rashes, no suspicious skin lesions noted    Assessment/ Plan:   Diagnoses and all orders for this visit:    1. Anxiety  Patient continue with psychiatry and psychology appointments  2. White coat syndrome with hypertension  Patient's home blood pressure log log shows blood pressures in low 130s over low 90s. Patient given DASH diet and recommended to increase exercise for both anxiety and blood pressure. Patient agrees with plan will return for blood pressure check  Follow-up Disposition:  Return in about 2 months (around 1/9/2018) for anxiety. I have discussed the diagnosis with the patient and the intended plan as seen in the above orders. The patient has received an after-visit summary and questions were answered concerning future plans. Medication Side Effects and Warnings were discussed with patient: yes  Patient Labs were reviewed and or requested: yes  Patient Past Records were reviewed and or requested  yes  I have discussed the diagnosis with the patient and the intended plan as seen in the above orders. The patient has received an after-visit summary and questions were answered concerning future plans. Pt agrees to call or return to clinic and/or go to closest ER with any worsening of symptoms. This may include, but not limited to increased fever (>100.4) with NSAIDS or Tylenol, increased edema, confusion, rash, worsening of presenting symptoms. 1) Remember to stay active and/or exercise regularly (I suggest 30-45 minutes daily)   2) For reliable dietary information, go to www. EATRIGHT.org. You may wish to consider seeing the nutritionist at [facility] at #[phone] or [phone], also consider the [address]   3) I routinely suggest a complete physical exam once each year (your birth month)

## 2017-11-09 NOTE — MR AVS SNAPSHOT
Visit Information Date & Time Provider Department Dept. Phone Encounter #  
 11/9/2017  4:00 PM [address] [place] [address] MD Tj [facility] and [place] [id number] Follow-up Instructions Return in about 2 months (around 1/9/2018) for anxiety. Your Appointments 11/16/2017  4:00 PM  
ESTABLISHED PATIENT with Darnell Hall [facility] ([facility]) Appt Note: 4 week follow-up [address] [address]  
[phone]  
  
   
 [address] [place] [address] [address] [place] [id number]  
  
    
 1/11/2018  4:00 PM  
Any with [address] [place] [address] MD Dotty  
[address] [place] [address] and Primary [facility] Appt Note: 2 month f/u  
 [address] [facility]  
  
   
 [address] [id number] Upcoming Health Maintenance Date Due DTaP/Tdap/Td series (2 - Td) 4/21/2027 Allergies as of 11/9/2017  Review Complete On: 11/9/2017 By: [address] [place] [address] MD Dotty  
 No Known Allergies Current Immunizations  Never Reviewed Name Date Influenza Vaccine (Quad) PF 10/13/2017, 12/7/2016 Not reviewed this visit Vitals BP Pulse Temp Resp Height(growth percentile) Weight(growth percentile) (!) 160/112 (BP 1 Location: Left arm, BP Patient Position: Sitting) 86 98.6 °F (37 °C) (Oral) 15 6' 1\" (1.854 m) 178 lb 12.8 oz (81.1 kg) SpO2 BMI Smoking Status 100% 23.59 kg/m2 Never Smoker Vitals History BMI and BSA Data Body Mass Index Body Surface Area  
 23.59 kg/m 2 2.04 m 2 Preferred Pharmacy [facility] Name Phone [id number] [phone] Your Updated Medication List  
  
   
This list is accurate as of: 11/9/17  4:19 PM.  Always use your most recent med list.  
  
  
  
  
 busPIRone 7.5 mg tablet Commonly known as:  BUSPAR Take 1 Tab by mouth two (2) times a day. nystatin-triamcinolone 100,000-0.1 unit/gram-% ointment Commonly known as:  Deidre Jitendra Apply  to affected area two (2) times a day. Follow-up Instructions Return in about 2 months (around 1/9/2018) for anxiety. Introducing [facility] & HEALTH SERVICES! Dear Ismael Solomon: Thank you for requesting a Play2Focus account. Our records indicate that you already have an active Play2Focus account. You can access your account anytime at [email or web address] Did you know that you can access your hospital and ER discharge instructions at any time in Play2Focus? You can also review all of your test results from your hospital stay or ER visit. Additional Information If you have questions, please visit the Frequently Asked Questions section of the Play2Focus website at [email or web address] Remember, Play2Focus is NOT to be used for urgent needs. For medical emergencies, dial 911. Now available from your iPhone and Android! Please provide this summary of care documentation to your next provider. Your primary care clinician is listed as Allen Lomax. If you have any questions after today's visit, please call [phone].

## 2017-11-16 ENCOUNTER — OFFICE VISIT (OUTPATIENT)
Dept: BEHAVIORAL/MENTAL HEALTH CLINIC | Age: 32
End: 2017-11-16

## 2017-11-16 VITALS
OXYGEN SATURATION: 99 % | HEIGHT: 73 IN | DIASTOLIC BLOOD PRESSURE: 107 MMHG | WEIGHT: 176 LBS | SYSTOLIC BLOOD PRESSURE: 158 MMHG | BODY MASS INDEX: 23.33 KG/M2 | HEART RATE: 71 BPM

## 2017-11-16 DIAGNOSIS — F43.22 ADJUSTMENT DISORDER WITH ANXIETY: ICD-10-CM

## 2017-11-16 RX ORDER — BUSPIRONE HYDROCHLORIDE 7.5 MG/1
7.5 TABLET ORAL 3 TIMES DAILY
Qty: 90 TAB | Refills: 0 | Status: SHIPPED | OUTPATIENT
Start: 2017-11-16 | End: 2017-12-08 | Stop reason: SDUPTHER

## 2017-11-16 NOTE — PROGRESS NOTES
Psychiatric Outpatient Progress Note    Account Number:  [de-identified]  Name: Yesenia Mahmood    SUBJECTIVE:   CHIEF COMPLAINT:  Yesenia Mahmood is a 28 y.o. male and was seen today for follow-up of psychiatric condition and psychotropic medication management. HPI:    Thania Fergusonjodi reports the following psychiatric symptoms:  anxiety and adjustment disorder with anxiety,. The symptoms have been present for few months and are of moderate to high severity. The symptoms occur occasionally. Pt reported sleeping for 7-8 hrs and denied difficulty initiating and maintaining sleep. Reported anxiety with increased stressors `related to having a baby. Worries a lot of trivial issues. Reported feels stressors to try to have a child. Reported has interest, has fair appetite, has energy, motivation and focus and concentrate. Denied hopelessness or helpelesness or passive suicide thoughts. Is happy in marriage. Denied any symptoms os psychosis or vangie. Additional symptomatology include anxiety. Reported increased heart rate, tightness in chest. He has been tested for sperm count. Had occasional difficulty performing due to anxiety. The above symptoms have been present for a few months since they are planning to have a baby. The patient reports onset of symptoms since September. Reported has anxiety since young age. Doree Dejan symptoms are of high severity as per patient's report. Contributing factors include:  trying to have a baby, stressful job. Patient denies SI/HI/SIB. Side Effects:  none      Fam/Soc Hx (from [place] with updates):    Family History   Problem Relation Age of Onset    Hypertension Mother       Social History   Substance Use Topics    Smoking status: Never Smoker    Smokeless tobacco: Never Used    Alcohol use Yes      Comment: average 1 beer/day or less       REVIEW OF SYSTEMS:  Psychiatric:  anxiety.   Appetite:good   Sleep: fitful                    Mental Status exam: WNL except for      Sensorium oriented to time, place and person   Relations cooperative    Eye Contact    appropriate   Appearance:  age appropriate, casually dressed and well dressed   Motor Behavior/Gait:  gait stable and within normal limits   Speech:  normal pitch and normal volume   Thought Process: goal directed, logical and within normal limits   Thought Content free of delusions and free of hallucinations   Suicidal ideations no plan , no intention and none   Homicidal ideations no plan , no intention and none   Mood:  anxious   Affect:  anxious and mood-congruent   Memory recent  adequate   Memory remote:  adequate   Concentration:  adequate   Abstraction:  abstract   Insight:  fair   Reliability fair   Judgment:  fair       MEDICAL DECISION MAKING  Data: pertinent labs, imaging, medical records and diagnostic tests reviewed and incorporated in diagnosis and treatment plan    No Known Allergies     Current Outpatient Prescriptions   Medication Sig Dispense Refill    busPIRone (BUSPAR) 7.5 mg tablet Take 1 Tab by mouth two (2) times a day. 60 Tab 0    nystatin-triamcinolone (MYCOLOG) 100,000-0.1 unit/gram-% ointment Apply  to affected area two (2) times a day. 30 g 0        Visit Vitals    BP (!) 158/107 (BP 1 Location: Left arm, BP Patient Position: Sitting)    Pulse 71    Ht 6' 1\" (1.854 m)    Wt 79.8 kg (176 lb)    SpO2 99%    BMI 23.22 kg/m2         Problems addressed today:  adjustment disorder with anxiety, generalized anxiety disorder    Assessment:   Justyna Curiel  is a [facility] y.o.  male  is partially responding to treatment. Symptoms are occurring occasionally. Medical h/o HT possibly dur to stress. . This is his first interaction with behavioral health. Client reported worries a lot all having a baby. Client reported that he has a stressful job and has high expectation with himself. Client reported has a current demanding job and is challenging job.  Client is happily  and trying to have baby and client is very anxious about it and had occasional performance anxiety. Reported his anxiety has improved with Buspar and still has occasional anxiety attacks at night while in bed. Reported feels tightness in chest and difficulty breathing during anxiety. Had 3 anxiety attacks in 2 weeks. Reported has racing thoughts. Denied any symptoms of depression. Has energy, interest, motivation and able to focus and concentrate. Plan to increase Buspar to target anxiety. Has occasional elevated B.P. Discussed importance of psychotherapy in anxiety. Has made appointment and is doing mindfulness meditation. Encouraged to exercise and relaxation techniques. Reviewed labs and records. Patient denies SI/HI/SIB. No evidence of AH/VH or delusions. Stacie Smith is partially responding to treatment and is tolerating treatment well. Psychoeducation, medication teaching, co-morbid illness and pertinent health factors to manage care were discussed. Overall, patient is stable at this time but will require ongoing medication management. Possible organic causes contributing are: HT    Risk Scoring- chronic illnesses and prescription drug management    Treatment Plan:  1. Medications:          Medication Changes/Adjustments: Increase Buspar 7.5 mg TID     Current Outpatient Prescriptions   Medication Sig Dispense Refill    busPIRone (BUSPAR) 7.5 mg tablet Take 1 Tab by mouth two (2) times a day. 60 Tab 0    nystatin-triamcinolone (MYCOLOG) 100,000-0.1 unit/gram-% ointment Apply  to affected area two (2) times a day. 30 g 0                  The following regarding medications was addressed:    (The risks and benefits of the proposed medication; the potential medication side effects ie    dry mouth, weight gain, GI upset, headache; patient given opportunity to ask questions)       2. Counseling and coordination of care including instructions for treatment, risks/benefits, risk factor reduction and patient/family education. He agrees with the plan. Patient instructed to call with any side effects, questions or issues. Instructed patient to call the clinic, and if after hours call the provider on call ifclient experiences any suicidal thought or ideas to hurt self or other. Also instructed to call 911 or go to the ED. Patient verbalized understanding and agreed to call    3. Follow-up Disposition:  Return in about 4 weeks (around 12/14/2017) for med check and follow up. 4. Other: Nutritional/health counseling on diet and exercise. For reliable dietary information, go to www. EATRIGHT.org. PSYCHOTHERAPY:  approx 16 minutes  Type:  Supportive/Cognitive Behavioral psychotherapy provided  Focus:     Current problems- stressors related to conceiving. discussed relaxation techniques. Medical issues- HT   Psychoeducation provided  Treatment plan reviewed with patient-including diagnosis and medications    Gil Burns is progressing.     Randal Duff NP  11/16/2017

## 2017-11-16 NOTE — PATIENT INSTRUCTIONS
Sleep Tips    What to avoid    · Do not have drinks with caffeine, such as coffee or black tea, for 8 hours before bed. · Do not smoke or use other types of tobacco near bedtime. Nicotine is a stimulant and can keep you awake. · Avoid drinking alcohol late in the evening, because it can cause you to wake in the middle of the night. · Do not eat a big meal close to bedtime. If you are hungry, eat a light snack. · Do not drink a lot of water close to bedtime, because the need to urinate may wake you up during the night. · Do not read or watch TV in bed. Use the bed only for sleeping and sexual activity. What to try    · Go to bed at the same time every night, and wake up at the same time every morning. Do not take naps during the day. · Keep your bedroom quiet, dark, and cool. · Get regular exercise, but not within 3 to 4 hours of your bedtime. · Sleep on a comfortable pillow and mattress. · If watching the clock makes you anxious, turn it facing away from you so you cannot see the time. · If you worry when you lie down, start a worry book. Well before bedtime, write down your worries, and then set the book and your concerns aside. · Try meditation or other relaxation techniques before you go to bed. · If you cannot fall asleep, get up and go to another room until you feel sleepy. Do something relaxing. Repeat your bedtime routine before you go to bed again. Make your house quiet and calm about an hour before bedtime. Turn down the lights, turn off the TV, log off the computer, and turn down the volume on music. This can help you relax after a busy day. Anxiety Disorder: Care Instructions  Your Care Instructions    Anxiety is a normal reaction to stress. Difficult situations can cause you to have symptoms such as sweaty palms and a nervous feeling. In an anxiety disorder, the symptoms are far more severe.  Constant worry, muscle tension, trouble sleeping, nausea and diarrhea, and other symptoms can make normal daily activities difficult or impossible. These symptoms may occur for no reason, and they can affect your work, school, or social life. Medicines, counseling, and self-care can all help. Follow-up care is a key part of your treatment and safety. Be sure to make and go to all appointments, and call your doctor if you are having problems. It's also a good idea to know your test results and keep a list of the medicines you take. How can you care for yourself at home? Take medicines exactly as directed. Call your doctor if you think you are having a problem with your medicine. Go to your counseling sessions and follow-up appointments. Recognize and accept your anxiety. Then, when you are in a situation that makes you anxious, say to yourself, \"This is not an emergency. I feel uncomfortable, but I am not in danger. I can keep going even if I feel anxious. \"  Be kind to your body:  Relieve tension with exercise or a massage. Get enough rest.  Avoid alcohol, caffeine, nicotine, and illegal drugs. They can increase your anxiety level and cause sleep problems. Learn and do relaxation techniques. See below for more about these techniques. Engage your mind. Get out and do something you enjoy. Go to a [employer] movie, or take a walk or hike. Plan your day. Having too much or too little to do can make you anxious. Keep a record of your symptoms. Discuss your fears with a good friend or family member, or join a support group for people with similar problems. Talking to others sometimes relieves stress. Get involved in social groups, or volunteer to help others. Being alone sometimes makes things seem worse than they are. Get at least 30 minutes of exercise on most days of the week to relieve stress. Walking is a good choice. You also may want to do other activities, such as running, swimming, cycling, or playing tennis or team sports. Relaxation techniques  Do relaxation exercises 10 to 20 minutes a day.  You can play soothing, relaxing music while you do them, if you wish. Tell others in your house that you are going to do your relaxation exercises. Ask them not to disturb you. Find a comfortable place, away from all distractions and noise. Lie down on your back, or sit with your back straight. Focus on your breathing. Make it slow and steady. Breathe in through your nose. Breathe out through either your nose or mouth. Breathe deeply, filling up the area between your navel and your rib cage. Breathe so that your belly goes up and down. Do not hold your breath. Breathe like this for 5 to 10 minutes. Notice the feeling of calmness throughout your whole body. As you continue to breathe slowly and deeply, relax by doing the following for another 5 to 10 minutes:  Tighten and relax each muscle group in your body. You can begin at your toes and work your way up to your head. Imagine your muscle groups relaxing and becoming heavy. Empty your mind of all thoughts. Let yourself relax more and more deeply. Become aware of the state of calmness that surrounds you. When your relaxation time is over, you can bring yourself back to alertness by moving your fingers and toes and then your hands and feet and then stretching and moving your entire body. Sometimes people fall asleep during relaxation, but they usually wake up shortly afterward. Always give yourself time to return to full alertness before you drive a car or do anything that might cause an accident if you are not fully alert. Never play a relaxation tape while you drive a car. When should you call for help? Call 911 anytime you think you may need emergency care. For example, call if:  ? You feel you cannot stop from hurting yourself or someone else. ? Keep the numbers for these national suicide hotlines: 7-652-960-TALK (2-593.976.1718) and 8-063-ISVVDQQ (1-305.615.2027).  If you or someone you know talks about suicide or feeling hopeless, get help right away. ? Watch closely for changes in your health, and be sure to contact your doctor if:  ? You have anxiety or fear that affects your life. ? You have symptoms of anxiety that are new or different from those you had before. Where can you learn more? Go to http://shadi-louie.info/. Enter P754 in the search box to learn more about \"Anxiety Disorder: Care Instructions. \"  Current as of: May 12, 2017  Content Version: 11.4  © 4804-4940 [employer]. Care instructions adapted under license by [employer] (which disclaims liability or warranty for this information). If you have questions about a medical condition or this instruction, always ask your healthcare professional. [address] any warranty or liability for your use of this information.   ·

## 2017-11-20 RX ORDER — SILDENAFIL 25 MG/1
25 TABLET, FILM COATED ORAL AS NEEDED
Qty: 6 TAB | Refills: 0 | Status: SHIPPED | OUTPATIENT
Start: 2017-11-20 | End: 2018-09-14 | Stop reason: ALTCHOICE

## 2017-12-08 ENCOUNTER — OFFICE VISIT (OUTPATIENT)
Dept: BEHAVIORAL/MENTAL HEALTH CLINIC | Age: 32
End: 2017-12-08

## 2017-12-08 VITALS
BODY MASS INDEX: 23.46 KG/M2 | DIASTOLIC BLOOD PRESSURE: 105 MMHG | WEIGHT: 177 LBS | HEIGHT: 73 IN | OXYGEN SATURATION: 98 % | SYSTOLIC BLOOD PRESSURE: 169 MMHG | HEART RATE: 75 BPM

## 2017-12-08 DIAGNOSIS — F41.8 PERFORMANCE ANXIETY: ICD-10-CM

## 2017-12-08 DIAGNOSIS — F43.22 ADJUSTMENT DISORDER WITH ANXIETY: Primary | ICD-10-CM

## 2017-12-08 RX ORDER — BUSPIRONE HYDROCHLORIDE 10 MG/1
10 TABLET ORAL
Qty: 30 TAB | Refills: 0 | Status: SHIPPED | OUTPATIENT
Start: 2017-12-08 | End: 2018-01-09 | Stop reason: SDUPTHER

## 2017-12-08 RX ORDER — BUSPIRONE HYDROCHLORIDE 7.5 MG/1
7.5 TABLET ORAL 2 TIMES DAILY
Qty: 60 TAB | Refills: 0 | Status: SHIPPED | OUTPATIENT
Start: 2017-12-08 | End: 2017-12-27 | Stop reason: SDUPTHER

## 2017-12-08 RX ORDER — ALPRAZOLAM 0.25 MG/1
0.25 TABLET ORAL
Qty: 15 TAB | Refills: 0 | Status: SHIPPED | OUTPATIENT
Start: 2017-12-08 | End: 2018-01-09 | Stop reason: SDUPTHER

## 2017-12-08 NOTE — PATIENT INSTRUCTIONS
Sleep Tips    What to avoid    · Do not have drinks with caffeine, such as coffee or black tea, for 8 hours before bed. · Do not smoke or use other types of tobacco near bedtime. Nicotine is a stimulant and can keep you awake. · Avoid drinking alcohol late in the evening, because it can cause you to wake in the middle of the night. · Do not eat a big meal close to bedtime. If you are hungry, eat a light snack. · Do not drink a lot of water close to bedtime, because the need to urinate may wake you up during the night. · Do not read or watch TV in bed. Use the bed only for sleeping and sexual activity. What to try    · Go to bed at the same time every night, and wake up at the same time every morning. Do not take naps during the day. · Keep your bedroom quiet, dark, and cool. · Get regular exercise, but not within 3 to 4 hours of your bedtime. · Sleep on a comfortable pillow and mattress. · If watching the clock makes you anxious, turn it facing away from you so you cannot see the time. · If you worry when you lie down, start a worry book. Well before bedtime, write down your worries, and then set the book and your concerns aside. · Try meditation or other relaxation techniques before you go to bed. · If you cannot fall asleep, get up and go to another room until you feel sleepy. Do something relaxing. Repeat your bedtime routine before you go to bed again. Make your house quiet and calm about an hour before bedtime. Turn down the lights, turn off the TV, log off the computer, and turn down the volume on music. This can help you relax after a busy day. Anxiety Disorder: Care Instructions  Your Care Instructions    Anxiety is a normal reaction to stress. Difficult situations can cause you to have symptoms such as sweaty palms and a nervous feeling. In an anxiety disorder, the symptoms are far more severe.  Constant worry, muscle tension, trouble sleeping, nausea and diarrhea, and other symptoms can make normal daily activities difficult or impossible. These symptoms may occur for no reason, and they can affect your work, school, or social life. Medicines, counseling, and self-care can all help. Follow-up care is a key part of your treatment and safety. Be sure to make and go to all appointments, and call your doctor if you are having problems. It's also a good idea to know your test results and keep a list of the medicines you take. How can you care for yourself at home? Take medicines exactly as directed. Call your doctor if you think you are having a problem with your medicine. Go to your counseling sessions and follow-up appointments. Recognize and accept your anxiety. Then, when you are in a situation that makes you anxious, say to yourself, \"This is not an emergency. I feel uncomfortable, but I am not in danger. I can keep going even if I feel anxious. \"  Be kind to your body:  Relieve tension with exercise or a massage. Get enough rest.  Avoid alcohol, caffeine, nicotine, and illegal drugs. They can increase your anxiety level and cause sleep problems. Learn and do relaxation techniques. See below for more about these techniques. Engage your mind. Get out and do something you enjoy. Go to a [employer] movie, or take a walk or hike. Plan your day. Having too much or too little to do can make you anxious. Keep a record of your symptoms. Discuss your fears with a good friend or family member, or join a support group for people with similar problems. Talking to others sometimes relieves stress. Get involved in social groups, or volunteer to help others. Being alone sometimes makes things seem worse than they are. Get at least 30 minutes of exercise on most days of the week to relieve stress. Walking is a good choice. You also may want to do other activities, such as running, swimming, cycling, or playing tennis or team sports. Relaxation techniques  Do relaxation exercises 10 to 20 minutes a day.  You can play soothing, relaxing music while you do them, if you wish. Tell others in your house that you are going to do your relaxation exercises. Ask them not to disturb you. Find a comfortable place, away from all distractions and noise. Lie down on your back, or sit with your back straight. Focus on your breathing. Make it slow and steady. Breathe in through your nose. Breathe out through either your nose or mouth. Breathe deeply, filling up the area between your navel and your rib cage. Breathe so that your belly goes up and down. Do not hold your breath. Breathe like this for 5 to 10 minutes. Notice the feeling of calmness throughout your whole body. As you continue to breathe slowly and deeply, relax by doing the following for another 5 to 10 minutes:  Tighten and relax each muscle group in your body. You can begin at your toes and work your way up to your head. Imagine your muscle groups relaxing and becoming heavy. Empty your mind of all thoughts. Let yourself relax more and more deeply. Become aware of the state of calmness that surrounds you. When your relaxation time is over, you can bring yourself back to alertness by moving your fingers and toes and then your hands and feet and then stretching and moving your entire body. Sometimes people fall asleep during relaxation, but they usually wake up shortly afterward. Always give yourself time to return to full alertness before you drive a car or do anything that might cause an accident if you are not fully alert. Never play a relaxation tape while you drive a car. When should you call for help? Call 911 anytime you think you may need emergency care. For example, call if:  ? You feel you cannot stop from hurting yourself or someone else. ? Keep the numbers for these national suicide hotlines: 1-581-230-TALK (3-245.793.3182) and 7-271-ILDZMUU (6-561.372.6198).  If you or someone you know talks about suicide or feeling hopeless, get help right away. ? Watch closely for changes in your health, and be sure to contact your doctor if:  ? You have anxiety or fear that affects your life. ? You have symptoms of anxiety that are new or different from those you had before. Where can you learn more? Go to http://shadi-louie.info/. Enter P754 in the search box to learn more about \"Anxiety Disorder: Care Instructions. \"  Current as of: May 12, 2017  Content Version: 11.4  © 8692-7741 [employer]. Care instructions adapted under license by [employer] (which disclaims liability or warranty for this information). If you have questions about a medical condition or this instruction, always ask your healthcare professional. [address] any warranty or liability for your use of this information.   ·

## 2017-12-08 NOTE — PROGRESS NOTES
Psychiatric Outpatient Progress Note    Account Number:  [de-identified]  Name: Nieves Delcid    SUBJECTIVE:   CHIEF COMPLAINT:  Nieves Delcid is a 28 y.o. male and was seen today for follow-up of psychiatric condition and psychotropic medication management. HPI:    Edna Yoanna reports the following psychiatric symptoms:  anxiety and adjustment disorder with anxiety,. The symptoms have been present for few months and are of moderate to high severity. The symptoms occur occasionally. Pt reported sleeping for 7-8 hrs and denied difficulty initiating and maintaining sleep. Reported anxiety with increased stressors `related to having a baby. Worries a lot of trivial issues. Reported feels stressors to try to have a child. Reported has interest, has fair appetite, has energy, motivation and focus and concentrate. Denied hopelessness or helpelesness or passive suicide thoughts. Is happy in marriage. Denied any symptoms os psychosis or vangie. Additional symptomatology include anxiety. Reported increased heart rate, tightness in chest. He has been tested for sperm count. Had occasional difficulty performing in bed due to anxiety. The above symptoms have been present for a few months since they are planning to have a baby. The patient reports onset of symptoms since September. Reported has anxiety since young age. Syliva Sor symptoms are of high severity as per patient's report. Contributing factors include:  trying to have a baby, stressful job. Patient denies SI/HI/SIB. Side Effects:  none      Fam/Soc Hx (from [facility] with updates):    Family History   Problem Relation Age of Onset    Hypertension Mother       Social History   Substance Use Topics    Smoking status: Never Smoker    Smokeless tobacco: Never Used    Alcohol use Yes      Comment: average 1 beer/day or less       REVIEW OF SYSTEMS:  Psychiatric:  anxiety.   Appetite:good   Sleep: fitful                    Mental Status exam: WNL except for Sensorium  oriented to time, place and person   Relations cooperative    Eye Contact    appropriate   Appearance:  age appropriate, casually dressed and well dressed   Motor Behavior/Gait:  gait stable and within normal limits   Speech:  normal pitch and normal volume   Thought Process: goal directed, logical and within normal limits   Thought Content free of delusions and free of hallucinations   Suicidal ideations no plan , no intention and none   Homicidal ideations no plan , no intention and none   Mood:  anxious   Affect:  anxious and mood-congruent   Memory recent  adequate   Memory remote:  adequate   Concentration:  adequate   Abstraction:  abstract   Insight:  fair   Reliability fair   Judgment:  fair       MEDICAL DECISION MAKING  Data: pertinent labs, imaging, medical records and diagnostic tests reviewed and incorporated in diagnosis and treatment plan    No Known Allergies     Current Outpatient Prescriptions   Medication Sig Dispense Refill    busPIRone (BUSPAR) 7.5 mg tablet Take 1 Tab by mouth two (2) times a day. With food 60 Tab 0    ALPRAZolam (XANAX) 0.25 mg tablet Take 1 Tab by mouth daily as needed for Anxiety. 15 Tab 0    busPIRone (BUSPAR) 10 mg tablet Take 1 Tab by mouth nightly. 30 Tab 0    sildenafil citrate (VIAGRA) 25 mg tablet Take 1 Tab by mouth as needed. 6 Tab 0    nystatin-triamcinolone (MYCOLOG) 100,000-0.1 unit/gram-% ointment Apply  to affected area two (2) times a day. 30 g 0        Visit Vitals    Ht 6' 1\" (1.854 m)         Problems addressed today:  adjustment disorder with anxiety, generalized anxiety disorder    Assessment:   Nieves Delcid  is a 28 y.o.  male  is partially responding to treatment. Symptoms are occurring occasionally. Medical h/o HT possibly dur to stress. . This is his first interaction with behavioral health. Client reported worries a lot all having a baby. Client reported that he has a stressful job and has high expectation with himself. Client reported has a current demanding job and is challenging job. Client is happily  and trying to have baby and client is very anxious about it and had occasional performance anxiety. Today he reported he had anxiety x 3 since last visit. It is never during the day. Reported has anxiety has improved with Buspar and still has occasional anxiety attacks at night while in bed. Reported improved sleep. Reported job is less stressful nowadays. Reported has racing thoughts when anxious. Denied any symptoms of depression. Has energy, interest, motivation and able to focus and concentrate. Plan to increase Buspar and begin alprazolam prn for anxiety. He had initial visit with Yoni Bhatt for psychotherapy. He is doing mindfulness meditation and exercise x3 per week now. Reviewed labs and records. Patient denies SI/HI/SIB. No evidence of AH/VH or delusions. .Client is partially responding to treatment and is tolerating treatment well. Psychoeducation, medication teaching, co-morbid illness and pertinent health factors to manage care were discussed. Overall, patient is stable at this time but will require ongoing medication management. Possible organic causes contributing are:? HT    Risk Scoring- chronic illnesses and prescription drug management    Treatment Plan:  1. Medications:          Medication Changes/Adjustments: Increase Buspar 7.5 mg BID and 10 mg in evening                                                              Begin alprazolam 0.25 mg prn daily     Current Outpatient Prescriptions   Medication Sig Dispense Refill    busPIRone (BUSPAR) 7.5 mg tablet Take 1 Tab by mouth two (2) times a day. With food 60 Tab 0    ALPRAZolam (XANAX) 0.25 mg tablet Take 1 Tab by mouth daily as needed for Anxiety. 15 Tab 0    busPIRone (BUSPAR) 10 mg tablet Take 1 Tab by mouth nightly. 30 Tab 0    sildenafil citrate (VIAGRA) 25 mg tablet Take 1 Tab by mouth as needed.  6 Tab 0    nystatin-triamcinolone (MYCOLOG) 100,000-0.1 unit/gram-% ointment Apply  to affected area two (2) times a day. 30 g 0                  The following regarding medications was addressed:    (The risks and benefits of the proposed medication; the potential medication side effects ie    dry mouth, weight gain, GI upset, headache; patient given opportunity to ask questions)       2. Counseling and coordination of care including instructions for treatment, risks/benefits, risk factor reduction and patient/family education. He agrees with the plan. Patient instructed to call with any side effects, questions or issues. Instructed patient to call the clinic, and if after hours call the provider on call ifclient experiences any suicidal thought or ideas to hurt self or other. Also instructed to call 911 or go to the ED. Patient verbalized understanding and agreed to call    3. Follow-up Disposition:  Return in about 4 weeks (around 1/5/2018) for med check and follow up. 4. Other: Nutritional/health counseling on diet and exercise. For reliable dietary information, go to www. [email or web address].org. PSYCHOTHERAPY:  approx 16 minutes  Type:  Supportive/Cognitive Behavioral psychotherapy provided  Focus:     Current problems- stressors related to conceiving. discussed relaxation techniques. Medical issues- HT   Psychoeducation provided  Treatment plan reviewed with patient-including diagnosis and medications    Elfrieda Smoke is partially progressing.     Darnell Hall NP  12/8/2017

## 2017-12-08 NOTE — MR AVS SNAPSHOT
Visit Information Date & Time Provider Department Dept. Phone Encounter #  
 12/8/2017 11:30 AM Mariana Schaumann, [facility] [phone] [id number] Follow-up Instructions Return in about 4 weeks (around 1/5/2018) for med check and follow up. Your Appointments 1/11/2018  4:00 PM  
Any with Jia Del Angel MD  
[address] [place] [address] and [facility] Appt Note: 2 month f/u  
 [address] [facility] [address] [place]  
  
   
 [address] [facility] [address] Upcoming Health Maintenance Date Due DTaP/Tdap/Td series (2 - Td) 4/21/2027 Allergies as of 12/8/2017  Review Complete On: 12/8/2017 By: Mariana Schaumann, NP No Known Allergies Current Immunizations  Never Reviewed Name Date Influenza Vaccine (Quad) PF 10/13/2017, 12/7/2016 Not reviewed this visit You Were Diagnosed With   
  
 Codes Comments Adjustment disorder with anxiety    -  Primary ICD-10-CM: I02.84 
ICD-9-CM: 309.24 Performance anxiety     ICD-10-CM: F41.8 ICD-9-CM: 300.09 Vitals BP Pulse Height(growth percentile) Weight(growth percentile) SpO2 BMI  
 (!) 169/105 (BP 1 Location: Left arm, BP Patient Position: Sitting) 75 6' 1\" (1.854 m) 177 lb (80.3 kg) 98% 23.35 kg/m2 Smoking Status Never Smoker Vitals History BMI and BSA Data Body Mass Index Body Surface Area  
 23.35 kg/m 2 2.03 m 2 Preferred Pharmacy Pharmacy Name Phone [id number] [phone] Your Updated Medication List  
  
   
This list is accurate as of: 12/8/17 11:44 AM.  Always use your most recent med list.  
  
  
  
  
 ALPRAZolam 0.25 mg tablet Commonly known as:  Ace Sa Take 1 Tab by mouth daily as needed for Anxiety. * busPIRone 7.5 mg tablet Commonly known as:  BUSPAR Take 1 Tab by mouth two (2) times a day. With food * busPIRone 10 mg tablet Commonly known as:  BUSPAR Take 1 Tab by mouth nightly. nystatin-triamcinolone 100,000-0.1 unit/gram-% ointment Commonly known as:  Keith Ely Apply  to affected area two (2) times a day. sildenafil citrate 25 mg tablet Commonly known as:  VIAGRA Take 1 Tab by mouth as needed. * Notice: This list has 2 medication(s) that are the same as other medications prescribed for you. Read the directions carefully, and ask your doctor or other care provider to review them with you. Prescriptions Printed Refills ALPRAZolam (XANAX) 0.25 mg tablet 0 Sig: Take 1 Tab by mouth daily as needed for Anxiety. Class: Print Route: Oral  
  
Prescriptions Sent to Pharmacy Refills  
 busPIRone (BUSPAR) 7.5 mg tablet 0 Sig: Take 1 Tab by mouth two (2) times a day. With food Class: Normal  
 [facility]: [phone] Route: Oral  
 busPIRone (BUSPAR) 10 mg tablet 0 Sig: Take 1 Tab by mouth nightly. Class: Normal  
 Pharmacy: [facility] Ph #: [phone] Route: Oral  
  
Follow-up Instructions Return in about 4 weeks (around 1/5/2018) for med check and follow up. Patient Instructions Sleep Tips What to avoid   
· Do not have drinks with caffeine, such as coffee or black tea, for 8 hours before bed. · Do not smoke or use other types of tobacco near bedtime. Nicotine is a stimulant and can keep you awake. · Avoid drinking alcohol late in the evening, because it can cause you to wake in the middle of the night. · Do not eat a big meal close to bedtime. If you are hungry, eat a light snack. · Do not drink a lot of water close to bedtime, because the need to urinate may wake you up during the night. · Do not read or watch TV in bed. Use the bed only for sleeping and sexual activity.  
What to try   
 · Go to bed at the same time every night, and wake up at the same time every morning. Do not take naps during the day. · Keep your bedroom quiet, dark, and cool. · Get regular exercise, but not within 3 to 4 hours of your bedtime. · Sleep on a comfortable pillow and mattress. · If watching the clock makes you anxious, turn it facing away from you so you cannot see the time. · If you worry when you lie down, start a worry book. Well before bedtime, write down your worries, and then set the book and your concerns aside. · Try meditation or other relaxation techniques before you go to bed. · If you cannot fall asleep, get up and go to another room until you feel sleepy. Do something relaxing. Repeat your bedtime routine before you go to bed again. Make your house quiet and calm about an hour before bedtime. Turn down the lights, turn off the TV, log off the computer, and turn down the volume on music. This can help you relax after a busy day. Anxiety Disorder: Care Instructions Your Care Instructions Anxiety is a normal reaction to stress. Difficult situations can cause you to have symptoms such as sweaty palms and a nervous feeling. In an anxiety disorder, the symptoms are far more severe. Constant worry, muscle tension, trouble sleeping, nausea and diarrhea, and other symptoms can make normal daily activities difficult or impossible. These symptoms may occur for no reason, and they can affect your work, school, or social life. Medicines, counseling, and self-care can all help. Follow-up care is a key part of your treatment and safety. Be sure to make and go to all appointments, and call your doctor if you are having problems. It's also a good idea to know your test results and keep a list of the medicines you take. How can you care for yourself at home? Take medicines exactly as directed. Call your doctor if you think you are having a problem with your medicine. Go to your counseling sessions and follow-up appointments. Recognize and accept your anxiety. Then, when you are in a situation that makes you anxious, say to yourself, \"This is not an emergency. I feel uncomfortable, but I am not in danger. I can keep going even if I feel anxious. \" 
Be kind to your body: 
Relieve tension with exercise or a massage. Get enough rest. 
Avoid alcohol, caffeine, nicotine, and illegal drugs. They can increase your anxiety level and cause sleep problems. Learn and do relaxation techniques. See below for more about these techniques. Engage your mind. Get out and do something you enjoy. Go to a [employer] movie, or take a walk or hike. Plan your day. Having too much or too little to do can make you anxious. Keep a record of your symptoms. Discuss your fears with a good friend or family member, or join a support group for people with similar problems. Talking to others sometimes relieves stress. Get involved in social groups, or volunteer to help others. Being alone sometimes makes things seem worse than they are. Get at least 30 minutes of exercise on most days of the week to relieve stress. Walking is a good choice. You also may want to do other activities, such as running, swimming, cycling, or playing tennis or team sports. Relaxation techniques Do relaxation exercises 10 to 20 minutes a day. You can play soothing, relaxing music while you do them, if you wish. Tell others in your house that you are going to do your relaxation exercises. Ask them not to disturb you. Find a comfortable place, away from all distractions and noise. Lie down on your back, or sit with your back straight. Focus on your breathing. Make it slow and steady. Breathe in through your nose. Breathe out through either your nose or mouth. Breathe deeply, filling up the area between your navel and your rib cage. Breathe so that your belly goes up and down. Do not hold your breath. Breathe like this for 5 to 10 minutes. Notice the feeling of calmness throughout your whole body. As you continue to breathe slowly and deeply, relax by doing the following for another 5 to 10 minutes: 
Tighten and relax each muscle group in your body. You can begin at your toes and work your way up to your head. Imagine your muscle groups relaxing and becoming heavy. Empty your mind of all thoughts. Let yourself relax more and more deeply. Become aware of the state of calmness that surrounds you. When your relaxation time is over, you can bring yourself back to alertness by moving your fingers and toes and then your hands and feet and then stretching and moving your entire body. Sometimes people fall asleep during relaxation, but they usually wake up shortly afterward. Always give yourself time to return to full alertness before you drive a car or do anything that might cause an accident if you are not fully alert. Never play a relaxation tape while you drive a car. When should you call for help? Call 911 anytime you think you may need emergency care. For example, call if: 
? You feel you cannot stop from hurting yourself or someone else. ? Keep the numbers for these national suicide hotlines: 4-949-272-TALK (6-621-368-773.282.3877) and 4-451-NJRFYZY (4-875.599.6542). If you or someone you know talks about suicide or feeling hopeless, get help right away. ? Watch closely for changes in your health, and be sure to contact your doctor if: 
? You have anxiety or fear that affects your life. ? You have symptoms of anxiety that are new or different from those you had before. Where can you learn more? Go to http://shadi-louie.info/. Enter P754 in the search box to learn more about \"Anxiety Disorder: Care Instructions. \" Current as of: May 12, 2017 Content Version: 11.4 © 1584-3011 Healthwise, Incorporated.  Care instructions adapted under license by [address] (which disclaims liability or warranty for this information). If you have questions about a medical condition or this instruction, always ask your healthcare professional. [address] any warranty or liability for your use of this information. · Introducing [facility]! Dear Kiran Hammond: Thank you for requesting a [employer] account. Our records indicate that you already have an active [employer] account. You can access your account anytime at [email or web address] Did you know that you can access your hospital and ER discharge instructions at any time in [employer]? You can also review all of your test results from your hospital stay or ER visit. Additional Information If you have questions, please visit the Frequently Asked Questions section of the [employer] website at [email or web address] Remember, [employer] is NOT to be used for urgent needs. For medical emergencies, dial 911. Now available from your iPhone and Android! Please provide this summary of care documentation to your next provider. Your primary care clinician is listed as Jia Del Angel. If you have any questions after today's visit, please call [phone].

## 2017-12-19 ENCOUNTER — HOSPITAL ENCOUNTER (OUTPATIENT)
Dept: LAB | Age: 32
Discharge: HOME | End: 2017-12-19

## 2017-12-22 LAB — SPECIMEN SENT TO LAB,INSX: NORMAL

## 2017-12-27 DIAGNOSIS — F43.22 ADJUSTMENT DISORDER WITH ANXIETY: ICD-10-CM

## 2017-12-27 DIAGNOSIS — F41.8 PERFORMANCE ANXIETY: ICD-10-CM

## 2017-12-28 RX ORDER — BUSPIRONE HYDROCHLORIDE 7.5 MG/1
7.5 TABLET ORAL 2 TIMES DAILY
Qty: 30 TAB | Refills: 0 | Status: SHIPPED | OUTPATIENT
Start: 2017-12-28 | End: 2018-01-09 | Stop reason: SDUPTHER

## 2018-01-04 DIAGNOSIS — F43.22 ADJUSTMENT DISORDER WITH ANXIETY: ICD-10-CM

## 2018-01-04 RX ORDER — BUSPIRONE HYDROCHLORIDE 10 MG/1
TABLET ORAL
Qty: 30 TAB | Refills: 0 | OUTPATIENT
Start: 2018-01-04

## 2018-01-09 ENCOUNTER — OFFICE VISIT (OUTPATIENT)
Dept: BEHAVIORAL/MENTAL HEALTH CLINIC | Age: 33
End: 2018-01-09

## 2018-01-09 VITALS
BODY MASS INDEX: 24.09 KG/M2 | DIASTOLIC BLOOD PRESSURE: 106 MMHG | SYSTOLIC BLOOD PRESSURE: 188 MMHG | WEIGHT: 182.6 LBS | HEART RATE: 75 BPM

## 2018-01-09 DIAGNOSIS — F43.22 ADJUSTMENT DISORDER WITH ANXIETY: ICD-10-CM

## 2018-01-09 DIAGNOSIS — F41.8 PERFORMANCE ANXIETY: ICD-10-CM

## 2018-01-09 RX ORDER — BUSPIRONE HYDROCHLORIDE 10 MG/1
10 TABLET ORAL
Qty: 30 TAB | Refills: 1 | Status: SHIPPED | OUTPATIENT
Start: 2018-01-09 | End: 2018-01-15

## 2018-01-09 RX ORDER — ALPRAZOLAM 0.25 MG/1
0.25 TABLET ORAL
Qty: 30 TAB | Refills: 0 | Status: SHIPPED | OUTPATIENT
Start: 2018-01-09 | End: 2018-01-15

## 2018-01-09 RX ORDER — BUSPIRONE HYDROCHLORIDE 7.5 MG/1
7.5 TABLET ORAL 2 TIMES DAILY
Qty: 60 TAB | Refills: 1 | Status: SHIPPED | OUTPATIENT
Start: 2018-01-09 | End: 2018-02-26

## 2018-01-09 NOTE — PROGRESS NOTES
Psychiatric Outpatient Progress Note    Account Number:  [de-identified]  Name: Chicho Mederos    SUBJECTIVE:   CHIEF COMPLAINT:  Chicho Mederos is a 28 y.o. male and was seen today for follow-up of psychiatric condition and psychotropic medication management. HPI:    Pollo Delgado reports the following psychiatric symptoms:  anxiety and adjustment disorder with anxiety,. The symptoms have been present for few months and are of moderate to high severity. The symptoms occur occasionally. Pt reported sleeping for 7-8 hrs and denied difficulty initiating and maintaining sleep. Reported anxiety with increased stressors `related to having a baby. Worries a lot of trivial issues. Reported feels stressors to try to have a child. Reported has interest, has fair appetite, has energy, motivation and focus and concentrate. Denied hopelessness or helpelesness or passive suicide thoughts. Is happy in marriage and does activities together with his spouse. Denied any symptoms os psychosis or vangie. Additional symptomatology include anxiety. He has been tested for sperm count. Had occasional difficulty performing in bed due to anxiety. The above symptoms have been present for a few months since they are planning to have a baby. The patient reports onset of symptoms since September. Reported has anxiety since young age. Mile Dus symptoms are of high severity as per patient's report. Contributing factors include:  trying to have a baby, stressful job. Patient denies SI/HI/SIB. Side Effects:  none      Fam/Soc Hx (from [place] with updates):    Family History   Problem Relation Age of Onset    Hypertension Mother       Social History   Substance Use Topics    Smoking status: Never Smoker    Smokeless tobacco: Never Used    Alcohol use Yes      Comment: average 1 beer/day or less       REVIEW OF SYSTEMS:  Psychiatric:  anxiety.   Appetite:good   Sleep: fitful                    Mental Status exam: WNL except for      Sensorium oriented to time, place and person   Relations cooperative    Eye Contact    appropriate   Appearance:  age appropriate, casually dressed and well dressed   Motor Behavior/Gait:  gait stable and within normal limits   Speech:  normal pitch and normal volume   Thought Process: goal directed, logical and within normal limits   Thought Content free of delusions and free of hallucinations   Suicidal ideations no plan , no intention and none   Homicidal ideations no plan , no intention and none   Mood:  anxious   Affect:  anxious and mood-congruent   Memory recent  adequate   Memory remote:  adequate   Concentration:  adequate   Abstraction:  abstract   Insight:  fair   Reliability fair   Judgment:  fair       MEDICAL DECISION MAKING  Data: pertinent labs, imaging, medical records and diagnostic tests reviewed and incorporated in diagnosis and treatment plan    No Known Allergies     Current Outpatient Prescriptions   Medication Sig Dispense Refill    ALPRAZolam (XANAX) 0.25 mg tablet Take 1 Tab by mouth daily as needed for Anxiety. 30 Tab 0    busPIRone (BUSPAR) 10 mg tablet Take 1 Tab by mouth nightly. 30 Tab 1    busPIRone (BUSPAR) 7.5 mg tablet Take 1 Tab by mouth two (2) times a day. With food 60 Tab 1    sildenafil citrate (VIAGRA) 25 mg tablet Take 1 Tab by mouth as needed. 6 Tab 0    nystatin-triamcinolone (MYCOLOG) 100,000-0.1 unit/gram-% ointment Apply  to affected area two (2) times a day. 30 g 0        Visit Vitals    BP (!) 188/106    Pulse 75    Wt 82.8 kg (182 lb 9.6 oz)    BMI 24.09 kg/m2         Problems addressed today:  adjustment disorder with anxiety, generalized anxiety disorder    Assessment:   Agnes Cason  is a 28 y.o.  male  is partially responding to treatment. Symptoms are occurring occasionally. Medical h/o HT possibly dur to stress. . This is his first interaction with behavioral health. Client reported worries a lot all having a baby.  Client reported that he has a stressful job and has high expectation with himself. Client reported has a current demanding job and is challenging job. Client is happily  and trying to have baby and client is very anxious about it and had occasional performance anxiety. Today he reported he had anxiety x 1 since last visit. It is never during the day only during the sex. Reported has anxiety has improved with Buspar. Reported improved sleep. Reported job is less stressful nowadays. Reported has racing thoughts when anxious. Denied any symptoms of depression. Has energy, interest, motivation and able to focus and concentrate. Client did not use alprazolam as his wife was sick and did not get intimate. Plan to continue Buspar and alprazolam prn for anxiety. He had initial visit with Vivienne Whiteside for psychotherapy. He is doing mindfulness meditation and exercise x3 per week now. Reviewed labs and records. Patient denies SI/HI/SIB. No evidence of AH/VH or delusions. Client is  responding to treatment and is tolerating treatment well. Understands the importance of psychotherapy in his treatment plan. Psychoeducation, medication teaching, co-morbid illness and pertinent health factors to manage care were discussed. Overall, patient is stable at this time but will require ongoing medication management. Possible organic causes contributing are:? HT    Risk Scoring- chronic illnesses and prescription drug management    Treatment Plan:  1. Medications:          Medication Changes/Adjustments: Continue  Buspar 7.5 mg BID and 10 mg in evening                                                               Continue alprazolam 0.25 mg prn daily HS    Current Outpatient Prescriptions   Medication Sig Dispense Refill    ALPRAZolam (XANAX) 0.25 mg tablet Take 1 Tab by mouth daily as needed for Anxiety. 30 Tab 0    busPIRone (BUSPAR) 10 mg tablet Take 1 Tab by mouth nightly.  30 Tab 1    busPIRone (BUSPAR) 7.5 mg tablet Take 1 Tab by mouth two (2) times a day. With food 60 Tab 1    sildenafil citrate (VIAGRA) 25 mg tablet Take 1 Tab by mouth as needed. 6 Tab 0    nystatin-triamcinolone (MYCOLOG) 100,000-0.1 unit/gram-% ointment Apply  to affected area two (2) times a day. 30 g 0                  The following regarding medications was addressed:    (The risks and benefits of the proposed medication; the potential medication side effects ie    dry mouth, weight gain, GI upset, headache; patient given opportunity to ask questions)       2. Counseling and coordination of care including instructions for treatment, risks/benefits, risk factor reduction and patient/family education. He agrees with the plan. Patient instructed to call with any side effects, questions or issues. Instructed patient to call the clinic, and if after hours call the provider on call ifclient experiences any suicidal thought or ideas to hurt self or other. Also instructed to call 911 or go to the ED. Patient verbalized understanding and agreed to call    3. Follow-up Disposition:  Return in about 4 weeks (around 2/6/2018) for med check and follow up. 4. Other: Nutritional/health counseling on diet and exercise. For reliable dietary information, go to www. EATRIGHT.org. PSYCHOTHERAPY:  approx 16 minutes  Type:  Supportive/Cognitive Behavioral psychotherapy provided  Focus:     Current problems- stressors related to conceiving. discussed relaxation techniques. Medical issues- HT   Psychoeducation provided  Treatment plan reviewed with patient-including diagnosis and medications    Mitch Perrin is partially progressing.     Oleksandr Hubbard NP  1/9/2018

## 2018-01-09 NOTE — MR AVS SNAPSHOT
Visit Information Date & Time Provider Department Dept. Phone Encounter #  
 1/9/2018  3:00 PM Roberth Pereira [facility] [phone] [id number] Follow-up Instructions Return in about 4 weeks (around 2/6/2018) for med check and follow up. Follow-up and Disposition History Your Appointments 1/11/2018  4:00 PM  
Any with Jenifer Tracey MD  
[address] [place] [address] and Primary Care [facility] Appt Note: 2 month f/u  
 [address] [facility]  
  
   
 [address] [address] Upcoming Health Maintenance Date Due DTaP/Tdap/Td series (2 - Td) 4/21/2027 Allergies as of 1/9/2018  Review Complete On: 1/9/2018 By: Chencho Chambers NP No Known Allergies Current Immunizations  Never Reviewed Name Date Influenza Vaccine (Quad) PF 10/13/2017, 12/7/2016 Not reviewed this visit You Were Diagnosed With   
  
 Codes Comments Adjustment disorder with anxiety     ICD-10-CM: F43.22 
ICD-9-CM: 309.24 Performance anxiety     ICD-10-CM: F41.8 ICD-9-CM: 300.09 Vitals BP Pulse Weight(growth percentile) BMI Smoking Status (!) 188/106 75 182 lb 9.6 oz (82.8 kg) 24.09 kg/m2 Never Smoker BMI and BSA Data Body Mass Index Body Surface Area 24.09 kg/m 2 2.07 m 2 [id number] [phone] Your Updated Medication List  
  
   
This list is accurate as of: 1/9/18  3:37 PM.  Always use your most recent med list.  
  
  
  
  
 ALPRAZolam 0.25 mg tablet Commonly known as:  Lena Grandchild Take 1 Tab by mouth daily as needed for Anxiety. * busPIRone 10 mg tablet Commonly known as:  BUSPAR Take 1 Tab by mouth nightly. * busPIRone 7.5 mg tablet Commonly known as:  BUSPAR Take 1 Tab by mouth two (2) times a day. With food nystatin-triamcinolone 100,000-0.1 unit/gram-% ointment Commonly known as:  [place] Apply  to affected area two (2) times a day. sildenafil citrate 25 mg tablet Commonly known as:  VIAGRA Take 1 Tab by mouth as needed. * Notice: This list has 2 medication(s) that are the same as other medications prescribed for you. Read the directions carefully, and ask your doctor or other care provider to review them with you. Prescriptions Printed Refills ALPRAZolam (XANAX) 0.25 mg tablet 0 Sig: Take 1 Tab by mouth daily as needed for Anxiety. Class: Print Route: Oral  
  
Prescriptions Sent to Pharmacy Refills  
 busPIRone (BUSPAR) 10 mg tablet 1 Sig: Take 1 Tab by mouth nightly. Class: Normal  
 Pharmacy: [address] [place] [address] #: [phone] Route: Oral  
 busPIRone (BUSPAR) 7.5 mg tablet 1 Sig: Take 1 Tab by mouth two (2) times a day. With food Class: Normal  
 Pharmacy: [address] [place] [address] #: [phone] Route: Oral  
  
Follow-up Instructions Return in about 4 weeks (around 2/6/2018) for med check and follow up. Patient Instructions Sleep Tips What to avoid   
· Do not have drinks with caffeine, such as coffee or black tea, for 8 hours before bed. · Do not smoke or use other types of tobacco near bedtime. Nicotine is a stimulant and can keep you awake. · Avoid drinking alcohol late in the evening, because it can cause you to wake in the middle of the night. · Do not eat a big meal close to bedtime. If you are hungry, eat a light snack. · Do not drink a lot of water close to bedtime, because the need to urinate may wake you up during the night. · Do not read or watch TV in bed. Use the bed only for sleeping and sexual activity.  
What to try   
· Go to bed at the same time every night, and wake up at the same time every morning. Do not take naps during the day. · Keep your bedroom quiet, dark, and cool. · Get regular exercise, but not within 3 to 4 hours of your bedtime. · Sleep on a comfortable pillow and mattress. · If watching the clock makes you anxious, turn it facing away from you so you cannot see the time. · If you worry when you lie down, start a worry book. Well before bedtime, write down your worries, and then set the book and your concerns aside. · Try meditation or other relaxation techniques before you go to bed. · If you cannot fall asleep, get up and go to another room until you feel sleepy. Do something relaxing. Repeat your bedtime routine before you go to bed again. Make your house quiet and calm about an hour before bedtime. Turn down the lights, turn off the TV, log off the computer, and turn down the volume on music. This can help you relax after a busy day. Anxiety Disorder: Care Instructions Your Care Instructions Anxiety is a normal reaction to stress. Difficult situations can cause you to have symptoms such as sweaty palms and a nervous feeling. In an anxiety disorder, the symptoms are far more severe. Constant worry, muscle tension, trouble sleeping, nausea and diarrhea, and other symptoms can make normal daily activities difficult or impossible. These symptoms may occur for no reason, and they can affect your work, school, or social life. Medicines, counseling, and self-care can all help. Follow-up care is a key part of your treatment and safety. Be sure to make and go to all appointments, and call your doctor if you are having problems. It's also a good idea to know your test results and keep a list of the medicines you take. How can you care for yourself at home? Take medicines exactly as directed. Call your doctor if you think you are having a problem with your medicine. Go to your counseling sessions and follow-up appointments. Recognize and accept your anxiety. Then, when you are in a situation that makes you anxious, say to yourself, \"This is not an emergency. I feel uncomfortable, but I am not in danger. I can keep going even if I feel anxious. \" 
Be kind to your body: 
Relieve tension with exercise or a massage. Get enough rest. 
Avoid alcohol, caffeine, nicotine, and illegal drugs. They can increase your anxiety level and cause sleep problems. Learn and do relaxation techniques. See below for more about these techniques. Engage your mind. Get out and do something you enjoy. Go to a [employer] movie, or take a walk or hike. Plan your day. Having too much or too little to do can make you anxious. Keep a record of your symptoms. Discuss your fears with a good friend or family member, or join a support group for people with similar problems. Talking to others sometimes relieves stress. Get involved in social groups, or volunteer to help others. Being alone sometimes makes things seem worse than they are. Get at least 30 minutes of exercise on most days of the week to relieve stress. Walking is a good choice. You also may want to do other activities, such as running, swimming, cycling, or playing tennis or team sports. Relaxation techniques Do relaxation exercises 10 to 20 minutes a day. You can play soothing, relaxing music while you do them, if you wish. Tell others in your house that you are going to do your relaxation exercises. Ask them not to disturb you. Find a comfortable place, away from all distractions and noise. Lie down on your back, or sit with your back straight. Focus on your breathing. Make it slow and steady. Breathe in through your nose. Breathe out through either your nose or mouth. Breathe deeply, filling up the area between your navel and your rib cage. Breathe so that your belly goes up and down. Do not hold your breath. Breathe like this for 5 to 10 minutes.  Notice the feeling of calmness throughout your whole body. As you continue to breathe slowly and deeply, relax by doing the following for another 5 to 10 minutes: 
Tighten and relax each muscle group in your body. You can begin at your toes and work your way up to your head. Imagine your muscle groups relaxing and becoming heavy. Empty your mind of all thoughts. Let yourself relax more and more deeply. Become aware of the state of calmness that surrounds you. When your relaxation time is over, you can bring yourself back to alertness by moving your fingers and toes and then your hands and feet and then stretching and moving your entire body. Sometimes people fall asleep during relaxation, but they usually wake up shortly afterward. Always give yourself time to return to full alertness before you drive a car or do anything that might cause an accident if you are not fully alert. Never play a relaxation tape while you drive a car. When should you call for help? Call 911 anytime you think you may need emergency care. For example, call if: 
? You feel you cannot stop from hurting yourself or someone else. ? Keep the numbers for these national suicide hotlines: 6-176-089-TALK (6-255.731.3782) and 5-595-HULCLLD (9-526.199.6858). If you or someone you know talks about suicide or feeling hopeless, get help right away. ? Watch closely for changes in your health, and be sure to contact your doctor if: 
? You have anxiety or fear that affects your life. ? You have symptoms of anxiety that are new or different from those you had before. Where can you learn more? Go to http://shadi-louie.info/. Enter P754 in the search box to learn more about \"Anxiety Disorder: Care Instructions. \" Current as of: May 12, 2017 Content Version: 11.4 © 6737-9563 Healthwise, Incorporated.  Care instructions adapted under license by [facility] (which disclaims liability or warranty for this information). If you have questions about a medical condition or this instruction, always ask your healthcare professional. [address] any warranty or liability for your use of this information. ·  
 
 
 Patient Instructions History Introducing [facility] SERVICES! Dear Yanet Fontana: Thank you for requesting a [employer] account. Our records indicate that you already have an active [employer] account. You can access your account anytime at [email or web address] Did you know that you can access your hospital and ER discharge instructions at any time in [employer]? You can also review all of your test results from your hospital stay or ER visit. Additional Information If you have questions, please visit the Frequently Asked Questions section of the [employer] website at [email or web address] Remember, [employer] is NOT to be used for urgent needs. For medical emergencies, dial 911. Now available from your iPhone and Android! Please provide this summary of care documentation to your next provider. Your primary care clinician is listed as Dulce Easton. If you have any questions after today's visit, please call [phone].

## 2018-01-09 NOTE — PATIENT INSTRUCTIONS
Sleep Tips    What to avoid    · Do not have drinks with caffeine, such as coffee or black tea, for 8 hours before bed. · Do not smoke or use other types of tobacco near bedtime. Nicotine is a stimulant and can keep you awake. · Avoid drinking alcohol late in the evening, because it can cause you to wake in the middle of the night. · Do not eat a big meal close to bedtime. If you are hungry, eat a light snack. · Do not drink a lot of water close to bedtime, because the need to urinate may wake you up during the night. · Do not read or watch TV in bed. Use the bed only for sleeping and sexual activity. What to try    · Go to bed at the same time every night, and wake up at the same time every morning. Do not take naps during the day. · Keep your bedroom quiet, dark, and cool. · Get regular exercise, but not within 3 to 4 hours of your bedtime. · Sleep on a comfortable pillow and mattress. · If watching the clock makes you anxious, turn it facing away from you so you cannot see the time. · If you worry when you lie down, start a worry book. Well before bedtime, write down your worries, and then set the book and your concerns aside. · Try meditation or other relaxation techniques before you go to bed. · If you cannot fall asleep, get up and go to another room until you feel sleepy. Do something relaxing. Repeat your bedtime routine before you go to bed again. Make your house quiet and calm about an hour before bedtime. Turn down the lights, turn off the TV, log off the computer, and turn down the volume on music. This can help you relax after a busy day. Anxiety Disorder: Care Instructions  Your Care Instructions    Anxiety is a normal reaction to stress. Difficult situations can cause you to have symptoms such as sweaty palms and a nervous feeling. In an anxiety disorder, the symptoms are far more severe.  Constant worry, muscle tension, trouble sleeping, nausea and diarrhea, and other symptoms can make normal daily activities difficult or impossible. These symptoms may occur for no reason, and they can affect your work, school, or social life. Medicines, counseling, and self-care can all help. Follow-up care is a key part of your treatment and safety. Be sure to make and go to all appointments, and call your doctor if you are having problems. It's also a good idea to know your test results and keep a list of the medicines you take. How can you care for yourself at home? Take medicines exactly as directed. Call your doctor if you think you are having a problem with your medicine. Go to your counseling sessions and follow-up appointments. Recognize and accept your anxiety. Then, when you are in a situation that makes you anxious, say to yourself, \"This is not an emergency. I feel uncomfortable, but I am not in danger. I can keep going even if I feel anxious. \"  Be kind to your body:  Relieve tension with exercise or a massage. Get enough rest.  Avoid alcohol, caffeine, nicotine, and illegal drugs. They can increase your anxiety level and cause sleep problems. Learn and do relaxation techniques. See below for more about these techniques. Engage your mind. Get out and do something you enjoy. Go to a authorSTREAM.com movie, or take a walk or hike. Plan your day. Having too much or too little to do can make you anxious. Keep a record of your symptoms. Discuss your fears with a good friend or family member, or join a support group for people with similar problems. Talking to others sometimes relieves stress. Get involved in social groups, or volunteer to help others. Being alone sometimes makes things seem worse than they are. Get at least 30 minutes of exercise on most days of the week to relieve stress. Walking is a good choice. You also may want to do other activities, such as running, swimming, cycling, or playing tennis or team sports. Relaxation techniques  Do relaxation exercises 10 to 20 minutes a day.  You can play soothing, relaxing music while you do them, if you wish. Tell others in your house that you are going to do your relaxation exercises. Ask them not to disturb you. Find a comfortable place, away from all distractions and noise. Lie down on your back, or sit with your back straight. Focus on your breathing. Make it slow and steady. Breathe in through your nose. Breathe out through either your nose or mouth. Breathe deeply, filling up the area between your navel and your rib cage. Breathe so that your belly goes up and down. Do not hold your breath. Breathe like this for 5 to 10 minutes. Notice the feeling of calmness throughout your whole body. As you continue to breathe slowly and deeply, relax by doing the following for another 5 to 10 minutes:  Tighten and relax each muscle group in your body. You can begin at your toes and work your way up to your head. Imagine your muscle groups relaxing and becoming heavy. Empty your mind of all thoughts. Let yourself relax more and more deeply. Become aware of the state of calmness that surrounds you. When your relaxation time is over, you can bring yourself back to alertness by moving your fingers and toes and then your hands and feet and then stretching and moving your entire body. Sometimes people fall asleep during relaxation, but they usually wake up shortly afterward. Always give yourself time to return to full alertness before you drive a car or do anything that might cause an accident if you are not fully alert. Never play a relaxation tape while you drive a car. When should you call for help? Call 911 anytime you think you may need emergency care. For example, call if:  ? You feel you cannot stop from hurting yourself or someone else. ? Keep the numbers for these national suicide hotlines: 5-380-731-TALK (7-520-223-809-817-5827) and 5-503-MQCNAME (4-582.505.6339).  If you or someone you know talks about suicide or feeling hopeless, get help right away. ? Watch closely for changes in your health, and be sure to contact your doctor if:  ? You have anxiety or fear that affects your life. ? You have symptoms of anxiety that are new or different from those you had before. Where can you learn more? Go to http://shadi-louie.info/. Enter P754 in the search box to learn more about \"Anxiety Disorder: Care Instructions. \"  Current as of: May 12, 2017  Content Version: 11.4  © 3415-3309 [employer]. Care instructions adapted under license by [employer] (which disclaims liability or warranty for this information). If you have questions about a medical condition or this instruction, always ask your healthcare professional. [address] any warranty or liability for your use of this information.   ·

## 2018-01-15 DIAGNOSIS — F41.8 PERFORMANCE ANXIETY: Primary | ICD-10-CM

## 2018-01-15 RX ORDER — PROPRANOLOL HYDROCHLORIDE 10 MG/1
10 TABLET ORAL
Qty: 15 TAB | Refills: 0 | Status: SHIPPED | OUTPATIENT
Start: 2018-01-15 | End: 2018-02-26

## 2018-01-19 ENCOUNTER — OFFICE VISIT (OUTPATIENT)
Dept: INTERNAL MEDICINE CLINIC | Age: 33
End: 2018-01-19

## 2018-01-19 VITALS
OXYGEN SATURATION: 99 % | HEIGHT: 73 IN | TEMPERATURE: 97.8 F | RESPIRATION RATE: 17 BRPM | SYSTOLIC BLOOD PRESSURE: 161 MMHG | WEIGHT: 177.7 LBS | DIASTOLIC BLOOD PRESSURE: 94 MMHG | HEART RATE: 91 BPM | BODY MASS INDEX: 23.55 KG/M2

## 2018-01-19 DIAGNOSIS — N52.9 ERECTILE DYSFUNCTION, UNSPECIFIED ERECTILE DYSFUNCTION TYPE: ICD-10-CM

## 2018-01-19 DIAGNOSIS — R03.0 ELEVATED BP WITHOUT DIAGNOSIS OF HYPERTENSION: Primary | ICD-10-CM

## 2018-01-19 DIAGNOSIS — F41.9 ANXIETY: ICD-10-CM

## 2018-01-19 RX ORDER — SILDENAFIL 100 MG/1
100 TABLET, FILM COATED ORAL AS NEEDED
Qty: 6 TAB | Refills: 0 | Status: SHIPPED | OUTPATIENT
Start: 2018-01-19 | End: 2018-09-14 | Stop reason: ALTCHOICE

## 2018-01-19 NOTE — MR AVS SNAPSHOT
[address] [place], [place] [address] [address] 
[phone] Patient: Luz Maria Mendoza MRN: [id number] :1985 Visit Information Date & Time Provider Department Dept. Phone Encounter #  
 2018  2:45 PM Isis Dueñas MD [facility] [phone] [id number] Follow-up Instructions Return in about 4 months (around 2018) for Blood Pressure Check. Follow-up and Disposition History Your Appointments 3/6/2018  3:00 PM  
ESTABLISHED PATIENT with Joanne Taylor NP [facility] ([facility]) Appt Note: 2 month follow-up [address] [facility] [address] [facility] [address]  
  
   
 [address] [facility] [address] [facility] [address] [address] Upcoming Health Maintenance Date Due DTaP/Tdap/Td series (2 - Td) 2027 Allergies as of 2018  Review Complete On: 2018 By: Isis Dueñas MD  
 No Known Allergies Current Immunizations  Never Reviewed Name Date Influenza Vaccine (Quad) PF 10/13/2017, 2016 Not reviewed this visit You Were Diagnosed With   
  
 Codes Comments Elevated BP without diagnosis of hypertension    -  Primary ICD-10-CM: R03.0 ICD-9-CM: 796.2 Erectile dysfunction, unspecified erectile dysfunction type     ICD-10-CM: N52.9 ICD-9-CM: 607.84 Anxiety     ICD-10-CM: F41.9 ICD-9-CM: 300.00 Vitals BP Pulse Temp Resp Height(growth percentile) Weight(growth percentile) (!) 161/94 (BP 1 Location: Right arm, BP Patient Position: Sitting) 91 97.8 °F (36.6 °C) (Oral) 17 6' 1\" (1.854 m) 177 lb 11.2 oz (80.6 kg) SpO2 BMI Smoking Status 99% 23.44 kg/m2 Never Smoker BMI and BSA Data Body Mass Index Body Surface Area  
 23.44 kg/m 2 2.04 m 2 Preferred Pharmacy [facility] Name Phone [id number] [phone] Your Updated Medication List  
  
   
This list is accurate as of: 1/19/18  4:14 PM.  Always use your most recent med list.  
  
  
  
  
 busPIRone 7.5 mg tablet Commonly known as:  BUSPAR Take 1 Tab by mouth two (2) times a day. With food  
  
 nystatin-triamcinolone 100,000-0.1 unit/gram-% ointment Commonly known as:  Verlyn Berry Apply  to affected area two (2) times a day. propranolol 10 mg tablet Commonly known as:  INDERAL Take 1 Tab by mouth nightly as needed. Indications: anxiety * sildenafil citrate 25 mg tablet Commonly known as:  VIAGRA Take 1 Tab by mouth as needed. * sildenafil citrate 100 mg tablet Commonly known as:  VIAGRA Take 1 Tab by mouth as needed. * Notice: This list has 2 medication(s) that are the same as other medications prescribed for you. Read the directions carefully, and ask your doctor or other care provider to review them with you. Prescriptions Sent to Pharmacy Refills  
 sildenafil citrate (VIAGRA) 100 mg tablet 0 Sig: Take 1 Tab by mouth as needed. Class: Normal  
 Pharmacy: [address] [place] [address] #: [phone] Route: Oral  
  
Follow-up Instructions Return in about 4 months (around 5/19/2018) for Blood Pressure Check. Introducing [facility]! Dear Maira Hong: Thank you for requesting a [employer] Switch account. Our records indicate that you already have an active The Switch account. You can access your account anytime at [email or web address] Did you know that you can access your hospital and ER discharge instructions at any time in The Switch? You can also review all of your test results from your hospital stay or ER visit. Additional Information If you have questions, please visit the Frequently Asked Questions section of the The Switch website at [email or web address] Remember, MyChart is NOT to be used for urgent needs. For medical emergencies, dial 911. Now available from your iPhone and Android! Please provide this summary of care documentation to your next provider. Your primary care clinician is listed as Lencho Rubin. If you have any questions after today's visit, please call [phone].

## 2018-01-19 NOTE — PROGRESS NOTES
Chief Complaint   Patient presents with    Hypertension     he is a 28y.o. year old male who presents for follow-up of blood pressure. Patient was told to monitor blood pressure at home and bring in the readings. Patient states readings were pretty good, high at times but averaged in low 130s and 85-90. Denies chest pain shortness of breath or DRIVER. Reviewed and agree with Nurse Note and duplicated in this note. Reviewed PmHx, RxHx, FmHx, SocHx, AllgHx and updated and dated in the chart. Family History   Problem Relation Age of Onset    Hypertension Mother      No past medical history on file.    Social History     Social History    Marital status:      Spouse name: N/A    Number of children: N/A    Years of education: N/A     Social History Main Topics    Smoking status: Never Smoker    Smokeless tobacco: Never Used    Alcohol use Yes      Comment: average 1 beer/day or less    Drug use: No    Sexual activity: Not on file     Other Topics Concern    Not on file     Social History Narrative        Review of Systems - negative except as listed above      Objective:     Vitals:    01/19/18 1454   BP: (!) 161/94   Pulse: 91   Resp: 17   Temp: 97.8 °F (36.6 °C)   TempSrc: Oral   SpO2: 99%   Weight: 177 lb 11.2 oz (80.6 kg)   Height: 6' 1\" (1.854 m)       Physical Examination: General appearance - alert, well appearing, and in no distress  Eyes - pupils equal and reactive, extraocular eye movements intact  Ears - bilateral TM's and external ear canals normal  Nose - normal and patent, no erythema, discharge or polyps  Mouth - mucous membranes moist, pharynx normal without lesions  Neck - supple, no significant adenopathy  Chest - clear to auscultation, no wheezes, rales or rhonchi, symmetric air entry  Heart - normal rate, regular rhythm, normal S1, S2, no murmurs, rubs, clicks or gallops  Abdomen - soft, nontender, nondistended, no masses or organomegaly  Neurological - alert, oriented, normal speech, no focal findings or movement disorder noted  Musculoskeletal - no joint tenderness, deformity or swelling  Extremities - peripheral pulses normal, no pedal edema, no clubbing or cyanosis  Skin - normal coloration and turgor, no rashes, no suspicious skin lesions noted    Assessment/ Plan:   Diagnoses and all orders for this visit:    1. Elevated BP without diagnosis of hypertension    2. Erectile dysfunction, unspecified erectile dysfunction type    3. Anxiety    Other orders  -     sildenafil citrate (VIAGRA) 100 mg tablet; Take 1 Tab by mouth as needed. Follow-up Disposition:  Return in about 4 months (around 5/19/2018) for Blood Pressure Check. 1) Remember to stay active and/or exercise regularly (I suggest 30-45 minutes daily)   2) For reliable dietary information, go to www. EATRIGHT.org. You may wish to consider seeing the nutritionist at [facility] [phone], also consider the [address] 3) I routinely suggest a complete physical exam once each year (your birth month)  I have discussed the diagnosis with the patient and the intended plan as seen in the above orders. The patient has received an after-visit summary and questions were answered concerning future plans. Medication Side Effects and Warnings were discussed with patient: yes  Patient Labs were reviewed and or requested: yes  Patient Past Records were reviewed and or requested  yes  I have discussed the diagnosis with the patient and the intended plan as seen in the above orders. Pt agrees to call or return to clinic and/or go to closest ER with any worsening of symptoms. This may include, but not limited to increased fever (>100.4) with NSAIDS or Tylenol, increased edema, confusion, rash, worsening of presenting symptoms. 1. Have you been to the ER, urgent care clinic since your last visit? Hospitalized since your last visit? No    2.  Have you seen or consulted any other health care providers outside of the [address] [place] [address] [place] since your last visit? Include any pap smears or colon screening.  No

## 2018-01-29 ENCOUNTER — OFFICE VISIT (OUTPATIENT)
Dept: BEHAVIORAL/MENTAL HEALTH CLINIC | Age: 33
End: 2018-01-29

## 2018-01-29 VITALS
WEIGHT: 177 LBS | HEART RATE: 79 BPM | OXYGEN SATURATION: 98 % | DIASTOLIC BLOOD PRESSURE: 98 MMHG | SYSTOLIC BLOOD PRESSURE: 158 MMHG | BODY MASS INDEX: 23.46 KG/M2 | HEIGHT: 73 IN

## 2018-01-29 DIAGNOSIS — F41.8 PERFORMANCE ANXIETY: ICD-10-CM

## 2018-01-29 DIAGNOSIS — F41.1 GAD (GENERALIZED ANXIETY DISORDER): Primary | ICD-10-CM

## 2018-01-29 RX ORDER — TRIAMCINOLONE ACETONIDE 0.25 MG/G
OINTMENT TOPICAL
Refills: 1 | COMMUNITY
Start: 2018-01-14 | End: 2019-09-13 | Stop reason: ALTCHOICE

## 2018-01-29 RX ORDER — BUSPIRONE HYDROCHLORIDE 10 MG/1
TABLET ORAL
Refills: 1 | COMMUNITY
Start: 2018-01-09 | End: 2018-02-26

## 2018-01-29 RX ORDER — ALPRAZOLAM 0.25 MG/1
TABLET ORAL
COMMUNITY
End: 2018-01-29

## 2018-01-29 RX ORDER — CITALOPRAM 20 MG/1
TABLET, FILM COATED ORAL
Qty: 30 TAB | Refills: 0 | Status: SHIPPED | OUTPATIENT
Start: 2018-01-29 | End: 2018-02-26 | Stop reason: SDUPTHER

## 2018-01-29 NOTE — PATIENT INSTRUCTIONS
Anxiety Disorder: Care Instructions  Your Care Instructions    Anxiety is a normal reaction to stress. Difficult situations can cause you to have symptoms such as sweaty palms and a nervous feeling. In an anxiety disorder, the symptoms are far more severe. Constant worry, muscle tension, trouble sleeping, nausea and diarrhea, and other symptoms can make normal daily activities difficult or impossible. These symptoms may occur for no reason, and they can affect your work, school, or social life. Medicines, counseling, and self-care can all help. Follow-up care is a key part of your treatment and safety. Be sure to make and go to all appointments, and call your doctor if you are having problems. It's also a good idea to know your test results and keep a list of the medicines you take. How can you care for yourself at home? · Take medicines exactly as directed. Call your doctor if you think you are having a problem with your medicine. · Go to your counseling sessions and follow-up appointments. · Recognize and accept your anxiety. Then, when you are in a situation that makes you anxious, say to yourself, \"This is not an emergency. I feel uncomfortable, but I am not in danger. I can keep going even if I feel anxious. \"  · Be kind to your body:  ¨ Relieve tension with exercise or a massage. ¨ Get enough rest.  ¨ Avoid alcohol, caffeine, nicotine, and illegal drugs. They can increase your anxiety level and cause sleep problems. ¨ Learn and do relaxation techniques. See below for more about these techniques. · Engage your mind. Get out and do something you enjoy. Go to a funny movie, or take a walk or hike. Plan your day. Having too much or too little to do can make you anxious. · Keep a record of your symptoms. Discuss your fears with a good friend or family member, or join a support group for people with similar problems. Talking to others sometimes relieves stress.   · Get involved in social groups, or volunteer to help others. Being alone sometimes makes things seem worse than they are. · Get at least 30 minutes of exercise on most days of the week to relieve stress. Walking is a good choice. You also may want to do other activities, such as running, swimming, cycling, or playing tennis or team sports. Relaxation techniques  Do relaxation exercises 10 to 20 minutes a day. You can play soothing, relaxing music while you do them, if you wish. · Tell others in your house that you are going to do your relaxation exercises. Ask them not to disturb you. · Find a comfortable place, away from all distractions and noise. · Lie down on your back, or sit with your back straight. · Focus on your breathing. Make it slow and steady. · Breathe in through your nose. Breathe out through either your nose or mouth. · Breathe deeply, filling up the area between your navel and your rib cage. Breathe so that your belly goes up and down. · Do not hold your breath. · Breathe like this for 5 to 10 minutes. Notice the feeling of calmness throughout your whole body. As you continue to breathe slowly and deeply, relax by doing the following for another 5 to 10 minutes:  · Tighten and relax each muscle group in your body. You can begin at your toes and work your way up to your head. · Imagine your muscle groups relaxing and becoming heavy. · Empty your mind of all thoughts. · Let yourself relax more and more deeply. · Become aware of the state of calmness that surrounds you. · When your relaxation time is over, you can bring yourself back to alertness by moving your fingers and toes and then your hands and feet and then stretching and moving your entire body. Sometimes people fall asleep during relaxation, but they usually wake up shortly afterward. · Always give yourself time to return to full alertness before you drive a car or do anything that might cause an accident if you are not fully alert.  Never play a relaxation tape while you drive a car. When should you call for help? Call 911 anytime you think you may need emergency care. For example, call if:  ? · You feel you cannot stop from hurting yourself or someone else. ? Keep the numbers for these national suicide hotlines: 0-340-454-TALK (9-542.558.7573) and 7-423-HZWYBNC (3-735.276.9443). If you or someone you know talks about suicide or feeling hopeless, get help right away. ? Watch closely for changes in your health, and be sure to contact your doctor if:  ? · You have anxiety or fear that affects your life. ? · You have symptoms of anxiety that are new or different from those you had before. Where can you learn more? Go to http://shadi-louie.info/. Enter P754 in the search box to learn more about \"Anxiety Disorder: Care Instructions. \"  Current as of: May 12, 2017  Content Version: 11.4  © 3949-2374 Healthwise, Incorporated. Care instructions adapted under license by [employer] (which disclaims liability or warranty for this information). If you have questions about a medical condition or this instruction, always ask your healthcare professional. [address] any warranty or liability for your use of this information.

## 2018-01-29 NOTE — MR AVS SNAPSHOT
Raymundo Chairez 
 
 
 [address] [facility] [address] [place] 
[phone] Patient: Barbra Lizarraga MRN: [id number] :1985 Visit Information Date & Time Provider Department Dept. Phone Encounter #  
 2018  9:00 AM [facility] [phone] [id number] Follow-up Instructions Return in about 4 weeks (around 2018). Follow-up and Disposition History Your Appointments 2018  1:00 PM  
ESTABLISHED PATIENT with Darin Vega NP [facility] ([facility]) Appt Note: 4 week follow up [address] [facility] [address] [facility] [address]  
[phone]  
  
   
 [address] [place] [id number]  
  
    
 3/6/2018  3:00 PM  
ESTABLISHED PATIENT with Darin Vega [facility] ([facility]) Appt Note: 2 month follow-up [address] [place] [address] [address] [place] [address]  
[phone]  
  
    
 2018  1:45 PM  
Any with [address] [place] [address] MD Dotty  
[address] [place] [address] and [facility]) Appt Note: f/up for 4 months  [place] [id number] [facility] [address] Upcoming Health Maintenance Date Due DTaP/Tdap/Td series (2 - Td) 2027 Allergies as of 2018  Review Complete On: 2018 By: Darin Vega NP No Known Allergies Current Immunizations  Never Reviewed Name Date Influenza Vaccine (Quad) PF 10/13/2017, 2016 Not reviewed this visit You Were Diagnosed With   
  
 Codes Comments OCTAVIO (generalized anxiety disorder)    -  Primary ICD-10-CM: F41.1 ICD-9-CM: 300.02 Performance anxiety     ICD-10-CM: F41.8 ICD-9-CM: 300.09 Vitals  BP Pulse Height(growth percentile) Weight(growth percentile) SpO2 BMI  
 (!) 158/98 (BP 1 Location: Left arm, BP Patient Position: Sitting) 79 6' 1\" (1.854 m) 177 lb (80.3 kg) 98% 23.35 kg/m2 Smoking Status Never Smoker Vitals History BMI and BSA Data Body Mass Index Body Surface Area  
 23.35 kg/m 2 2.03 m 2 Preferred Pharmacy Pharmacy Name Phone [facility] #[id number] Garcia [phone] Your Updated Medication List  
  
   
This list is accurate as of: 1/29/18  9:56 AM.  Always use your most recent med list.  
  
  
  
  
 * busPIRone 7.5 mg tablet Commonly known as:  BUSPAR Take 1 Tab by mouth two (2) times a day. With food * busPIRone 10 mg tablet Commonly known as:  BUSPAR  
TAKE 1 TABLET BY MOUTH NIGHTLY.  
  
 citalopram 20 mg tablet Commonly known as:  Bhavin Webb Please take half tablet daily for 7 days and then take 1 tablet daily  Indications: Generalized Anxiety Disorder  
  
 nystatin-triamcinolone 100,000-0.1 unit/gram-% ointment Commonly known as:  Verlyn Ripley Apply  to affected area two (2) times a day. propranolol 10 mg tablet Commonly known as:  INDERAL Take 1 Tab by mouth nightly as needed. Indications: anxiety * sildenafil citrate 25 mg tablet Commonly known as:  VIAGRA Take 1 Tab by mouth as needed. * sildenafil citrate 100 mg tablet Commonly known as:  VIAGRA Take 1 Tab by mouth as needed. triamcinolone acetonide 0.025 % ointment Commonly known as:  KENALOG  
APPLY TO AFFECTED AREA ON GROIN/BUTTOCKS TWICE DAILY * Notice: This list has 4 medication(s) that are the same as other medications prescribed for you. Read the directions carefully, and ask your doctor or other care provider to review them with you. Prescriptions Sent to Pharmacy Refills  
 citalopram (CELEXA) 20 mg tablet 0 Sig: Please take half tablet daily for 7 days and then take 1 tablet daily  Indications: Generalized Anxiety Disorder  Class: Normal  
 Pharmacy: [facility]/pharmacy #[id number] - Melina VIEYRA  #: [phone] Follow-up Instructions Return in about 4 weeks (around 2/26/2018). Patient Instructions Anxiety Disorder: Care Instructions Your Care Instructions Anxiety is a normal reaction to stress. Difficult situations can cause you to have symptoms such as sweaty palms and a nervous feeling. In an anxiety disorder, the symptoms are far more severe. Constant worry, muscle tension, trouble sleeping, nausea and diarrhea, and other symptoms can make normal daily activities difficult or impossible. These symptoms may occur for no reason, and they can affect your work, school, or social life. Medicines, counseling, and self-care can all help. Follow-up care is a key part of your treatment and safety. Be sure to make and go to all appointments, and call your doctor if you are having problems. It's also a good idea to know your test results and keep a list of the medicines you take. How can you care for yourself at home? · Take medicines exactly as directed. Call your doctor if you think you are having a problem with your medicine. · Go to your counseling sessions and follow-up appointments. · Recognize and accept your anxiety. Then, when you are in a situation that makes you anxious, say to yourself, \"This is not an emergency. I feel uncomfortable, but I am not in danger. I can keep going even if I feel anxious. \" · Be kind to your body: ¨ Relieve tension with exercise or a massage. ¨ Get enough rest. 
¨ Avoid alcohol, caffeine, nicotine, and illegal drugs. They can increase your anxiety level and cause sleep problems. ¨ Learn and do relaxation techniques. See below for more about these techniques. · Engage your mind. Get out and do something you enjoy. Go to a funny movie, or take a walk or hike. Plan your day. Having too much or too little to do can make you anxious. · Keep a record of your symptoms. Discuss your fears with a good friend or family member, or join a support group for people with similar problems. Talking to others sometimes relieves stress. · Get involved in social groups, or volunteer to help others. Being alone sometimes makes things seem worse than they are. · Get at least 30 minutes of exercise on most days of the week to relieve stress. Walking is a good choice. You also may want to do other activities, such as running, swimming, cycling, or playing tennis or team sports. Relaxation techniques Do relaxation exercises 10 to 20 minutes a day. You can play soothing, relaxing music while you do them, if you wish. · Tell others in your house that you are going to do your relaxation exercises. Ask them not to disturb you. · Find a comfortable place, away from all distractions and noise. · Lie down on your back, or sit with your back straight. · Focus on your breathing. Make it slow and steady. · Breathe in through your nose. Breathe out through either your nose or mouth. · Breathe deeply, filling up the area between your navel and your rib cage. Breathe so that your belly goes up and down. · Do not hold your breath. · Breathe like this for 5 to 10 minutes. Notice the feeling of calmness throughout your whole body. As you continue to breathe slowly and deeply, relax by doing the following for another 5 to 10 minutes: · Tighten and relax each muscle group in your body. You can begin at your toes and work your way up to your head. · Imagine your muscle groups relaxing and becoming heavy. · Empty your mind of all thoughts. · Let yourself relax more and more deeply. · Become aware of the state of calmness that surrounds you. · When your relaxation time is over, you can bring yourself back to alertness by moving your fingers and toes and then your hands and feet and then stretching and moving your entire body.  Sometimes people fall asleep during relaxation, but they usually wake up shortly afterward. · Always give yourself time to return to full alertness before you drive a car or do anything that might cause an accident if you are not fully alert. Never play a relaxation tape while you drive a car. When should you call for help? Call 911 anytime you think you may need emergency care. For example, call if: 
? · You feel you cannot stop from hurting yourself or someone else. ? Keep the numbers for these national suicide hotlines: 4-452-436-TALK (7-415-560-916.113.2512) and 4-632-VHLHDNX (9-531.645.7722). If you or someone you know talks about suicide or feeling hopeless, get help right away. ? Watch closely for changes in your health, and be sure to contact your doctor if: 
? · You have anxiety or fear that affects your life. ? · You have symptoms of anxiety that are new or different from those you had before. Where can you learn more? Go to http://shadi-louie.info/. Enter P754 in the search box to learn more about \"Anxiety Disorder: Care Instructions. \" Current as of: May 12, 2017 Content Version: 11.4 © 5183-5909 [employer]. Care instructions adapted under license by [employer] (which disclaims liability or warranty for this information). If you have questions about a medical condition or this instruction, always ask your healthcare professional. [place] [address] any warranty or liability for your use of this information. Patient Instructions History Introducing hospitals & HEALTH SERVICES! Dear Milan Winslow: Thank you for requesting a [facility] account. Our records indicate that you already have an active [facility] account. You can access your account anytime at [email or web address] Did you know that you can access your hospital and ER discharge instructions at any time in [facility]? You can also review all of your test results from your hospital stay or ER visit. Additional Information If you have questions, please visit the Frequently Asked Questions section of the [employer] website at [email or web address] Remember, [employer] is NOT to be used for urgent needs. For medical emergencies, dial 911. Now available from your iPhone and Android! Please provide this summary of care documentation to your next provider. Your primary care clinician is listed as Kennedy Dunbar. If you have any questions after today's visit, please call [phone].

## 2018-01-29 NOTE — PROGRESS NOTES
Psychiatric Outpatient Progress Note    Account Number:  [de-identified]  Name: Foster Garcia    SUBJECTIVE:   CHIEF COMPLAINT:  Foster Garcia is a 28 y.o. male and was seen today for follow-up of psychiatric condition and psychotropic medication management. HPI:    Kim Oviedo reports the following psychiatric symptoms:  anxiety and adjustment disorder with anxiety,. Medical h/o HT possibly dur to stress. . This is his first interaction with behavioral health. Client reported worries a lot all having a baby. Client reported that he has a stressful job and has high expectation with himself. Client reported has a current demanding job and is challenging job. Client is happily  and trying to have baby and client is very anxious about it and had occasional performance anxiety. The symptoms have been present for few months and are of moderate to high severity. The symptoms occur occasionally. Pt reported sleeping for 7-8 hrs and denied difficulty initiating and maintaining sleep. Reported anxiety with increased stressors `related to having a baby. Worries a lot of trivial issues. Reported feels stressors to try to have a child. Reported has interest, has fair appetite, has energy, motivation and focus and concentrate. Denied hopelessness or helpelesness or passive suicide thoughts. Is happy in marriage and does activities together with his spouse. Denied any symptoms os psychosis or vangie. Additional symptomatology include anxiety. He has been tested for sperm count. Had occasional difficulty performing in bed due to anxiety. The above symptoms have been present for a few months since they are planning to have a baby. The patient reports onset of symptoms since September. Reported has anxiety since young age. Delman Piper symptoms are of high severity as per patient's report. Contributing factors include:  trying to have a baby, stressful job. Patient denies SI/HI/SIB.      Side Effects:  none      Fam/Soc Hx (from [facility] with updates):    Family History   Problem Relation Age of Onset    Hypertension Mother       Social History   Substance Use Topics    Smoking status: Never Smoker    Smokeless tobacco: Never Used    Alcohol use Yes      Comment: average 1 beer/day or less       REVIEW OF SYSTEMS:  Psychiatric:  anxiety. Appetite:good   Sleep: fitful                    Mental Status exam: WNL except for      Sensorium  oriented to time, place and person   Relations cooperative    Eye Contact    appropriate   Appearance:  age appropriate, casually dressed and well dressed   Motor Behavior/Gait:  gait stable and within normal limits   Speech:  normal pitch and normal volume   Thought Process: goal directed, logical and within normal limits   Thought Content free of delusions and free of hallucinations   Suicidal ideations no plan , no intention and none   Homicidal ideations no plan , no intention and none   Mood:  anxious   Affect:  anxious and mood-congruent   Memory recent  adequate   Memory remote:  adequate   Concentration:  adequate   Abstraction:  abstract   Insight:  fair   Reliability fair   Judgment:  fair       MEDICAL DECISION MAKING  Data: pertinent labs, imaging, medical records and diagnostic tests reviewed and incorporated in diagnosis and treatment plan    No Known Allergies     Current Outpatient Prescriptions   Medication Sig Dispense Refill    busPIRone (BUSPAR) 10 mg tablet TAKE 1 TABLET BY MOUTH NIGHTLY. 1    triamcinolone acetonide (KENALOG) 0.025 % ointment APPLY TO AFFECTED AREA ON GROIN/BUTTOCKS TWICE DAILY  1    citalopram (CELEXA) 20 mg tablet Please take half tablet daily for 7 days and then take 1 tablet daily  Indications: Generalized Anxiety Disorder 30 Tab 0    sildenafil citrate (VIAGRA) 100 mg tablet Take 1 Tab by mouth as needed. 6 Tab 0    propranolol (INDERAL) 10 mg tablet Take 1 Tab by mouth nightly as needed.  Indications: anxiety 15 Tab 0    busPIRone (BUSPAR) 7.5 mg tablet Take 1 Tab by mouth two (2) times a day. With food 60 Tab 1    sildenafil citrate (VIAGRA) 25 mg tablet Take 1 Tab by mouth as needed. 6 Tab 0    nystatin-triamcinolone (MYCOLOG) 100,000-0.1 unit/gram-% ointment Apply  to affected area two (2) times a day. 30 g 0        Visit Vitals    BP (!) 158/98 (BP 1 Location: Left arm, BP Patient Position: Sitting)    Pulse 79    Ht 6' 1\" (1.854 m)    Wt 80.3 kg (177 lb)    SpO2 98%    BMI 23.35 kg/m2         Problems addressed today:  adjustment disorder with anxiety, generalized anxiety disorder    Assessment:  reviewed: appropriate use. Foster Garcia  is a 28 y.o.  male  is not responding to treatment. Symptoms are occurring occasionally. Today he reported that his anxiety has worsened and is not taking Buspar daily. Had occasional successful performance without anxiety. Client reported has constant anxiety through out the day. Reported sleeping fitful, appetite is variable r/t anxiety,  has racing thoughts when anxious. Denied any symptoms of depression. Has energy, interest, motivation and able to focus and concentrate. Client has not taken alprazolam. His PCP has given Viagra and had benefits. Plan to discontinue Buspar and alprazolam. Use prn propranolol for performance anxiety prn. Plan to begin Citalopram. Continue psychotherapy. He is doing mindfulness meditation and exercise x 3 per week now. Reviewed labs and records. Patient denies SI/HI/SIB. No evidence of AH/VH or delusions. Understands the importance of psychotherapy in his treatment plan. Psychoeducation, medication teaching, co-morbid illness and pertinent health factors to manage care were discussed. Overall, patient is stable at this time but will require ongoing medication management. Possible organic causes contributing are:? HT    Risk Scoring- chronic illnesses and prescription drug management    Treatment Plan:  1.   Medications:          Medication Changes/Adjustments: discontinue  Buspar 7.5 mg BID and 10 mg in evening                                                               discontinue alprazolam 0.25 mg prn daily HS                                                                Continue propranolol 10 mg prn                                                              Continue citalopram 10 mg daily x 7 days and then take 20 mg daily                                                                Continue psychotherapy       Current Outpatient Prescriptions   Medication Sig Dispense Refill    busPIRone (BUSPAR) 10 mg tablet TAKE 1 TABLET BY MOUTH NIGHTLY. 1    triamcinolone acetonide (KENALOG) 0.025 % ointment APPLY TO AFFECTED AREA ON GROIN/BUTTOCKS TWICE DAILY  1    citalopram (CELEXA) 20 mg tablet Please take half tablet daily for 7 days and then take 1 tablet daily  Indications: Generalized Anxiety Disorder 30 Tab 0    sildenafil citrate (VIAGRA) 100 mg tablet Take 1 Tab by mouth as needed. 6 Tab 0    propranolol (INDERAL) 10 mg tablet Take 1 Tab by mouth nightly as needed. Indications: anxiety 15 Tab 0    busPIRone (BUSPAR) 7.5 mg tablet Take 1 Tab by mouth two (2) times a day. With food 60 Tab 1    sildenafil citrate (VIAGRA) 25 mg tablet Take 1 Tab by mouth as needed. 6 Tab 0    nystatin-triamcinolone (MYCOLOG) 100,000-0.1 unit/gram-% ointment Apply  to affected area two (2) times a day. 30 g 0                  The following regarding medications was addressed:    (The risks and benefits of the proposed medication; the potential medication side effects ie    dry mouth, weight gain, GI upset, headache; patient given opportunity to ask questions)       2. Counseling and coordination of care including instructions for treatment, risks/benefits, risk factor reduction and patient/family education. He agrees with the plan. Patient instructed to call with any side effects, questions or issues.      Instructed patient to call the clinic, and if after hours call the provider on call ifclient experiences any suicidal thought or ideas to hurt self or other. Also instructed to call 911 or go to the ED. Patient verbalized understanding and agreed to call    3. Follow-up Disposition:  Return in about 4 weeks (around 2/26/2018). 4. Other: Nutritional/health counseling on diet and exercise. For reliable dietary information, go to www. [employer].org. PSYCHOTHERAPY:  approx 5-7 minutes  Type:  Supportive/Cognitive Behavioral psychotherapy provided  Focus:     Current problems- stressors related to conceiving. discussed relaxation techniques. Medical issues- HT   Psychoeducation provided  Treatment plan reviewed with patient-including diagnosis and medications    Haily Grant is progressing.     Alycia Piper NP  1/29/2018

## 2018-02-26 ENCOUNTER — OFFICE VISIT (OUTPATIENT)
Dept: BEHAVIORAL/MENTAL HEALTH CLINIC | Age: 33
End: 2018-02-26

## 2018-02-26 VITALS
HEART RATE: 86 BPM | DIASTOLIC BLOOD PRESSURE: 113 MMHG | BODY MASS INDEX: 22.56 KG/M2 | WEIGHT: 171 LBS | SYSTOLIC BLOOD PRESSURE: 181 MMHG

## 2018-02-26 DIAGNOSIS — F41.0 PANIC ATTACKS: ICD-10-CM

## 2018-02-26 DIAGNOSIS — F41.1 GAD (GENERALIZED ANXIETY DISORDER): Primary | ICD-10-CM

## 2018-02-26 RX ORDER — CITALOPRAM 40 MG/1
40 TABLET, FILM COATED ORAL DAILY
Qty: 30 TAB | Refills: 0 | Status: SHIPPED | OUTPATIENT
Start: 2018-02-26 | End: 2018-03-26 | Stop reason: SDUPTHER

## 2018-02-26 NOTE — PATIENT INSTRUCTIONS
Sleep Tips    What to avoid    · Do not have drinks with caffeine, such as coffee or black tea, for 8 hours before bed. · Do not smoke or use other types of tobacco near bedtime. Nicotine is a stimulant and can keep you awake. · Avoid drinking alcohol late in the evening, because it can cause you to wake in the middle of the night. · Do not eat a big meal close to bedtime. If you are hungry, eat a light snack. · Do not drink a lot of water close to bedtime, because the need to urinate may wake you up during the night. · Do not read or watch TV in bed. Use the bed only for sleeping and sexual activity. What to try    · Go to bed at the same time every night, and wake up at the same time every morning. Do not take naps during the day. · Keep your bedroom quiet, dark, and cool. · Get regular exercise, but not within 3 to 4 hours of your bedtime. · Sleep on a comfortable pillow and mattress. · If watching the clock makes you anxious, turn it facing away from you so you cannot see the time. · If you worry when you lie down, start a worry book. Well before bedtime, write down your worries, and then set the book and your concerns aside. · Try meditation or other relaxation techniques before you go to bed. · If you cannot fall asleep, get up and go to another room until you feel sleepy. Do something relaxing. Repeat your bedtime routine before you go to bed again. Make your house quiet and calm about an hour before bedtime. Turn down the lights, turn off the TV, log off the computer, and turn down the volume on music. This can help you relax after a busy day. Anxiety Disorder: Care Instructions  Your Care Instructions    Anxiety is a normal reaction to stress. Difficult situations can cause you to have symptoms such as sweaty palms and a nervous feeling. In an anxiety disorder, the symptoms are far more severe.  Constant worry, muscle tension, trouble sleeping, nausea and diarrhea, and other symptoms can make normal daily activities difficult or impossible. These symptoms may occur for no reason, and they can affect your work, school, or social life. Medicines, counseling, and self-care can all help. Follow-up care is a key part of your treatment and safety. Be sure to make and go to all appointments, and call your doctor if you are having problems. It's also a good idea to know your test results and keep a list of the medicines you take. How can you care for yourself at home? Take medicines exactly as directed. Call your doctor if you think you are having a problem with your medicine. Go to your counseling sessions and follow-up appointments. Recognize and accept your anxiety. Then, when you are in a situation that makes you anxious, say to yourself, \"This is not an emergency. I feel uncomfortable, but I am not in danger. I can keep going even if I feel anxious. \"  Be kind to your body:  Relieve tension with exercise or a massage. Get enough rest.  Avoid alcohol, caffeine, nicotine, and illegal drugs. They can increase your anxiety level and cause sleep problems. Learn and do relaxation techniques. See below for more about these techniques. Engage your mind. Get out and do something you enjoy. Go to a [employer] movie, or take a walk or hike. Plan your day. Having too much or too little to do can make you anxious. Keep a record of your symptoms. Discuss your fears with a good friend or family member, or join a support group for people with similar problems. Talking to others sometimes relieves stress. Get involved in social groups, or volunteer to help others. Being alone sometimes makes things seem worse than they are. Get at least 30 minutes of exercise on most days of the week to relieve stress. Walking is a good choice. You also may want to do other activities, such as running, swimming, cycling, or playing tennis or team sports. Relaxation techniques  Do relaxation exercises 10 to 20 minutes a day.  You can play soothing, relaxing music while you do them, if you wish. Tell others in your house that you are going to do your relaxation exercises. Ask them not to disturb you. Find a comfortable place, away from all distractions and noise. Lie down on your back, or sit with your back straight. Focus on your breathing. Make it slow and steady. Breathe in through your nose. Breathe out through either your nose or mouth. Breathe deeply, filling up the area between your navel and your rib cage. Breathe so that your belly goes up and down. Do not hold your breath. Breathe like this for 5 to 10 minutes. Notice the feeling of calmness throughout your whole body. As you continue to breathe slowly and deeply, relax by doing the following for another 5 to 10 minutes:  Tighten and relax each muscle group in your body. You can begin at your toes and work your way up to your head. Imagine your muscle groups relaxing and becoming heavy. Empty your mind of all thoughts. Let yourself relax more and more deeply. Become aware of the state of calmness that surrounds you. When your relaxation time is over, you can bring yourself back to alertness by moving your fingers and toes and then your hands and feet and then stretching and moving your entire body. Sometimes people fall asleep during relaxation, but they usually wake up shortly afterward. Always give yourself time to return to full alertness before you drive a car or do anything that might cause an accident if you are not fully alert. Never play a relaxation tape while you drive a car. When should you call for help? Call 911 anytime you think you may need emergency care. For example, call if:  ? You feel you cannot stop from hurting yourself or someone else. ? Keep the numbers for these national suicide hotlines: 4-986-085-TALK (6-902.766.3241) and 5-164-BZJKRQN (4-298.414.5681).  If you or someone you know talks about suicide or feeling hopeless, get help right away. ? Watch closely for changes in your health, and be sure to contact your doctor if:  ? You have anxiety or fear that affects your life. ? You have symptoms of anxiety that are new or different from those you had before. Where can you learn more? Go to http://shadi-louie.info/. Enter P754 in the search box to learn more about \"Anxiety Disorder: Care Instructions. \"  Current as of: May 12, 2017  Content Version: 11.4  © 0222-4505 [employer]. Care instructions adapted under license by [employer] (which disclaims liability or warranty for this information). If you have questions about a medical condition or this instruction, always ask your healthcare professional. [address] any warranty or liability for your use of this information. Panic Attacks: Care Instructions  Your Care Instructions    During a panic attack, you may have a feeling of intense fear or terror, trouble breathing, chest pain or tightness, heartbeat changes, dizziness, sweating, and shaking. A panic attack starts suddenly and usually lasts from 5 to 20 minutes but may last even longer. You have the most anxiety about 10 minutes after the attack starts. An attack can begin with a stressful event, or it can happen without a cause. Although panic attacks can cause scary symptoms, you can learn to manage them with self-care, counseling, and medicine. Follow-up care is a key part of your treatment and safety. Be sure to make and go to all appointments, and call your doctor if you are having problems. It's also a good idea to know your test results and keep a list of the medicines you take. How can you care for yourself at home? Take your medicine exactly as directed. Call your doctor if you think you are having a problem with your medicine. Go to your counseling sessions and follow-up appointments. Recognize and accept your anxiety.  Then, when you are in a situation that makes you anxious, say to yourself, \"This is not an emergency. I feel uncomfortable, but I am not in danger. I can keep going even if I feel anxious. \"  Be kind to your body:  Relieve tension with exercise or a massage. Get enough rest.  Avoid alcohol, caffeine, nicotine, and illegal drugs. They can increase your anxiety level, cause sleep problems, or trigger a panic attack. Learn and do relaxation techniques. See below for more about these techniques. Engage your mind. Get out and do something you enjoy. Go to a [employer], or take a walk or hike. Plan your day. Having too much or too little to do can make you anxious. Keep a record of your symptoms. Discuss your fears with a good friend or family member, or join a support group for people with similar problems. Talking to others sometimes relieves stress. Get involved in social groups, or volunteer to help others. Being alone sometimes makes things seem worse than they are. Get at least 30 minutes of exercise on most days of the week to relieve stress. Walking is a good choice. You also may want to do other activities, such as running, swimming, cycling, or playing tennis or team sports. Relaxation techniques  Do relaxation exercises for 10 to 20 minutes a day. You can play soothing, relaxing music while you do them, if you wish. Tell others in your house that you are going to do your relaxation exercises. Ask them not to disturb you. Find a comfortable place, away from all distractions and noise. Lie down on your back, or sit with your back straight. Focus on your breathing. Make it slow and steady. Breathe in through your nose. Breathe out through either your nose or mouth. Breathe deeply, filling up the area between your navel and your rib cage. Breathe so that your belly goes up and down. Do not hold your breath. Breathe like this for 5 to 10 minutes. Notice the feeling of calmness throughout your whole body.   As you continue to breathe slowly and deeply, relax by doing the following for another 5 to 10 minutes:  Tighten and relax each muscle group in your body. You can begin at your toes and work your way up to your head. Imagine your muscle groups relaxing and becoming heavy. Empty your mind of all thoughts. Let yourself relax more and more deeply. Become aware of the state of calmness that surrounds you. When your relaxation time is over, you can bring yourself back to alertness by moving your fingers and toes and then your hands and feet and then stretching and moving your entire body. Sometimes people fall asleep during relaxation, but they usually wake up shortly afterward. Always give yourself time to return to full alertness before you drive a car or do anything that might cause an accident if you are not fully alert. Never play a relaxation tape while driving a car. When should you call for help? Call 911 anytime you think you may need emergency care. For example, call if:  ? You feel you cannot stop from hurting yourself or someone else. ? Watch closely for changes in your health, and be sure to contact your doctor if:  ? Your panic attacks get worse. ? You have new or different anxiety. ? You are not getting better as expected. Where can you learn more? Go to http://shadi-louie.info/. Enter H601 in the search box to learn more about \"Panic Attacks: Care Instructions. \"  Current as of: May 12, 2017  Content Version: 11.4  © 3162-9433 [employer]. Care instructions adapted under license by [employer] (which disclaims liability or warranty for this information). If you have questions about a medical condition or this instruction, always ask your healthcare professional. [place] [address] any warranty or liability for your use of this information.   ·

## 2018-02-26 NOTE — PROGRESS NOTES
Psychiatric Outpatient Progress Note    Account Number:  [de-identified]  Name: Barbra Lizarraga    SUBJECTIVE:   CHIEF COMPLAINT:  Barbra Lizarraga is a 28 y.o. male and was seen today for follow-up of psychiatric condition and psychotropic medication management. HPI:    Vicenta Kumar reports the following psychiatric symptoms:  anxiety and adjustment disorder with anxiety,. Medical h/o HT possibly dur to stress. . This is his first interaction with behavioral health. Client reported worries a lot all having a baby. Client reported that he has a stressful job and has high expectation with himself. Client reported has a current demanding job and is challenging job. Client is happily  and trying to have baby and client is very anxious about it and had occasional performance anxiety. The symptoms have been present for few months and are of moderate to high severity. The symptoms occur occasionally. Pt reported sleeping for 7-8 hrs and denied difficulty initiating and maintaining sleep. Worries a lot of trivial issues. Reported feels stressors about new responsibility of a child. Reported has interest, has fair appetite, has energy, motivation and focus and concentrate. Denied hopelessness or helpelesness or passive suicide thoughts. Is happy in marriage and does activities together with his spouse. Denied any symptoms os psychosis or vangie. Additional symptomatology include anxiety. The above symptoms have been present for a few months since they are planning to have a baby. The patient reports onset of symptoms since September. Reported has anxiety since young age. Melody Rodríguez symptoms are of high severity as per patient's report. Contributing factors include:  trying to have a baby, stressful job. Patient denies SI/HI/SIB.      Side Effects:  none      Fam/Soc Hx (from [place] with updates):    Family History   Problem Relation Age of Onset    Hypertension Mother       Social History   Substance Use Topics    Smoking status: Never Smoker    Smokeless tobacco: Never Used    Alcohol use Yes      Comment: average 1 beer/day or less       REVIEW OF SYSTEMS:  Psychiatric:  anxiety. Appetite:good   Sleep: fitful                    Mental Status exam: WNL except for      Sensorium  oriented to time, place and person   Relations cooperative    Eye Contact    appropriate   Appearance:  age appropriate, casually dressed and well dressed   Motor Behavior/Gait:  gait stable and within normal limits   Speech:  normal pitch and normal volume   Thought Process: goal directed, logical and within normal limits   Thought Content free of delusions and free of hallucinations   Suicidal ideations no plan , no intention and none   Homicidal ideations no plan , no intention and none   Mood:  anxious   Affect:  anxious and mood-congruent   Memory recent  adequate   Memory remote:  adequate   Concentration:  adequate   Abstraction:  abstract   Insight:  fair   Reliability fair   Judgment:  fair       MEDICAL DECISION MAKING  Data: pertinent labs, imaging, medical records and diagnostic tests reviewed and incorporated in diagnosis and treatment plan    No Known Allergies     Current Outpatient Prescriptions   Medication Sig Dispense Refill    citalopram (CELEXA) 40 mg tablet Take 1 Tab by mouth daily. Indications: Generalized Anxiety Disorder 30 Tab 0    triamcinolone acetonide (KENALOG) 0.025 % ointment APPLY TO AFFECTED AREA ON GROIN/BUTTOCKS TWICE DAILY  1    nystatin-triamcinolone (MYCOLOG) 100,000-0.1 unit/gram-% ointment Apply  to affected area two (2) times a day. 30 g 0    sildenafil citrate (VIAGRA) 100 mg tablet Take 1 Tab by mouth as needed. 6 Tab 0    sildenafil citrate (VIAGRA) 25 mg tablet Take 1 Tab by mouth as needed.  6 Tab 0        Visit Vitals    BP (!) 181/113    Pulse 86    Wt 77.6 kg (171 lb)    BMI 22.56 kg/m2         Problems addressed today:  adjustment disorder with anxiety, generalized anxiety disorder    Assessment:  reviewed: appropriate use. Yanci Mullins  is a 28 y.o.  male  is partially responding to treatment. Symptoms are occurring occasionally. Today he reported that he has good news that his wife is pregnant and was happy and later on is anxious about pregnancy, new responsibilities, He worries a lot about every thing ion life in general. He is supporting his wife. Client reported has anxiety at work occasionally. Reported sleeping fitful, appetite is variable r/t anxiety,  has racing thoughts when anxious. Denied any symptoms of depression. Has energy, interest, motivation and able to focus and concentrate. Client taken alprazolam prn only. His PCP has given Viagra and is not taking any more. Client reported taking alprazolam and is benefiting for panic attacks. Plan to increase the dose of citalopram to target anxiety. Continue psychotherapy. He is doing mindfulness meditation and exercise x 3 per week now. Reviewed labs and records. Patient denies SI/HI/SIB. No evidence of AH/VH or delusions. Understands the importance of psychotherapy in his treatment plan. Psychoeducation, medication teaching, co-morbid illness and pertinent health factors to manage care were discussed. Overall, patient is stable at this time but will require ongoing medication management. Possible organic causes contributing are:? HT    Risk Scoring- chronic illnesses and prescription drug management    Treatment Plan:  1.   Medications:          Medication Changes/Adjustments: Continue alprazolam 0.25 mg prn daily HS- none given                                                              Continue citalopram 30 mg daily x5- 7 days and then take 40 mg daily                                                                Continue psychotherapy - marian                                                               Ordered labs- CBC, VIt. D, BMP, UDS       Current Outpatient Prescriptions   Medication Sig Dispense Refill    citalopram (CELEXA) 40 mg tablet Take 1 Tab by mouth daily. Indications: Generalized Anxiety Disorder 30 Tab 0    triamcinolone acetonide (KENALOG) 0.025 % ointment APPLY TO AFFECTED AREA ON GROIN/BUTTOCKS TWICE DAILY  1    nystatin-triamcinolone (MYCOLOG) 100,000-0.1 unit/gram-% ointment Apply  to affected area two (2) times a day. 30 g 0    sildenafil citrate (VIAGRA) 100 mg tablet Take 1 Tab by mouth as needed. 6 Tab 0    sildenafil citrate (VIAGRA) 25 mg tablet Take 1 Tab by mouth as needed. 6 Tab 0                  The following regarding medications was addressed:    (The risks and benefits of the proposed medication; the potential medication side effects ie    dry mouth, weight gain, GI upset, headache; patient given opportunity to ask questions)       2. Counseling and coordination of care including instructions for treatment, risks/benefits, risk factor reduction and patient/family education. He agrees with the plan. Patient instructed to call with any side effects, questions or issues. Instructed patient to call the clinic, and if after hours call the provider on call ifclient experiences any suicidal thought or ideas to hurt self or other. Also instructed to call 911 or go to the ED. Patient verbalized understanding and agreed to call    3. Follow-up Disposition:  Return in about 4 weeks (around 3/26/2018) for med check and follow up. 4. Other: Nutritional/health counseling on diet and exercise. For reliable dietary information, go to www. [email or web address].org. PSYCHOTHERAPY:  approx 5-7 minutes  Type:  Supportive/Cognitive Behavioral psychotherapy provided  Focus:     Current problems- stressors related to conceiving. discussed relaxation techniques. Medical issues- HT   Psychoeducation provided  Treatment plan reviewed with patient-including diagnosis and medications    Alpha Appl is progressing.     Ngozi Sosa NP  2/26/2018

## 2018-02-26 NOTE — MR AVS SNAPSHOT
[address] [facility] 
 
 
 [address] [facility] [address] 1400 [address] [place] [address] 
[phone] Patient: Foster Garcia MRN: [id number] :1985 Visit Information Date & Time Provider Department Dept. Phone Encounter #  
 2018  1:00 PM Zeke Joshi, [facility] [phone] [id number] Follow-up Instructions Return in about 4 weeks (around 3/26/2018) for med check and follow up. Your Appointments 3/26/2018  1:00 PM  
ESTABLISHED PATIENT with Zeke Joshi NP [facility] Appt Note: 2 month follow-up; 4 week follow-up [address] [place] [address] [address] [facility] Tiff Reddon [address]  
  
   
 [address] [facility] 7 [id number] 2018  1:45 PM  
Any with Katerine De Leon MD  
[address] [place] [address] and Primary Care [facility] Appt Note: f/up for 4 months [address] [facility]  
  
   
 [address] [id number] Upcoming Health Maintenance Date Due DTaP/Tdap/Td series (2 - Td) 2027 Allergies as of 2018  Review Complete On: 2018 By: Zeke Joshi NP No Known Allergies Current Immunizations  Never Reviewed Name Date Influenza Vaccine (Quad) PF 10/13/2017, 2016 Not reviewed this visit You Were Diagnosed With   
  
 Codes Comments OCTAVIO (generalized anxiety disorder)    -  Primary ICD-10-CM: F41.1 ICD-9-CM: 300.02 Panic attacks     ICD-10-CM: F41.0 ICD-9-CM: 300.01 Vitals BP Pulse Weight(growth percentile) BMI Smoking Status (!) 181/113 86 171 lb (77.6 kg) 22.56 kg/m2 Never Smoker BMI and BSA Data Body Mass Index Body Surface Area  
 22.56 kg/m 2 2 m 2 Preferred Pharmacy Pharmacy Name Phone [id number] [phone] Your Updated Medication List  
  
   
 This list is accurate as of 2/26/18  1:40 PM.  Always use your most recent med list.  
  
  
  
  
 citalopram 40 mg tablet Commonly known as:  Matti Peak Take 1 Tab by mouth daily. Indications: Generalized Anxiety Disorder  
  
 nystatin-triamcinolone 100,000-0.1 unit/gram-% ointment Commonly known as:  Smith Ivon Apply  to affected area two (2) times a day. * sildenafil citrate 25 mg tablet Commonly known as:  VIAGRA Take 1 Tab by mouth as needed. * sildenafil citrate 100 mg tablet Commonly known as:  VIAGRA Take 1 Tab by mouth as needed. triamcinolone acetonide 0.025 % ointment Commonly known as:  KENALOG  
APPLY TO AFFECTED AREA ON GROIN/BUTTOCKS TWICE DAILY * Notice: This list has 2 medication(s) that are the same as other medications prescribed for you. Read the directions carefully, and ask your doctor or other care provider to review them with you. Prescriptions Sent to Pharmacy Refills  
 citalopram (CELEXA) 40 mg tablet 0 Sig: Take 1 Tab by mouth daily. Indications: Generalized Anxiety Disorder Class: Normal  
 Pharmacy: [address] [place] [address] #: [phone] Route: Oral  
  
We Performed the Following 5-DRUG SCREEN, UR [id number] Custom] CBC WITH AUTOMATED DIFF [41467 CPT(R)] METABOLIC PANEL, COMPREHENSIVE [14535 CPT(R)] TSH 3RD GENERATION [93734 CPT(R)] Follow-up Instructions Return in about 4 weeks (around 3/26/2018) for med check and follow up. Patient Instructions Sleep Tips What to avoid   
· Do not have drinks with caffeine, such as coffee or black tea, for 8 hours before bed. · Do not smoke or use other types of tobacco near bedtime. Nicotine is a stimulant and can keep you awake. · Avoid drinking alcohol late in the evening, because it can cause you to wake in the middle of the night. · Do not eat a big meal close to bedtime.  If you are hungry, eat a light snack. 
· Do not drink a lot of water close to bedtime, because the need to urinate may wake you up during the night. · Do not read or watch TV in bed. Use the bed only for sleeping and sexual activity. What to try   
· Go to bed at the same time every night, and wake up at the same time every morning. Do not take naps during the day. · Keep your bedroom quiet, dark, and cool. · Get regular exercise, but not within 3 to 4 hours of your bedtime. · Sleep on a comfortable pillow and mattress. · If watching the clock makes you anxious, turn it facing away from you so you cannot see the time. · If you worry when you lie down, start a worry book. Well before bedtime, write down your worries, and then set the book and your concerns aside. · Try meditation or other relaxation techniques before you go to bed. · If you cannot fall asleep, get up and go to another room until you feel sleepy. Do something relaxing. Repeat your bedtime routine before you go to bed again. Make your house quiet and calm about an hour before bedtime. Turn down the lights, turn off the TV, log off the computer, and turn down the volume on music. This can help you relax after a busy day. Anxiety Disorder: Care Instructions Your Care Instructions Anxiety is a normal reaction to stress. Difficult situations can cause you to have symptoms such as sweaty palms and a nervous feeling. In an anxiety disorder, the symptoms are far more severe. Constant worry, muscle tension, trouble sleeping, nausea and diarrhea, and other symptoms can make normal daily activities difficult or impossible. These symptoms may occur for no reason, and they can affect your work, school, or social life. Medicines, counseling, and self-care can all help. Follow-up care is a key part of your treatment and safety.  Be sure to make and go to all appointments, and call your doctor if you are having problems. It's also a good idea to know your test results and keep a list of the medicines you take. How can you care for yourself at home? Take medicines exactly as directed. Call your doctor if you think you are having a problem with your medicine. Go to your counseling sessions and follow-up appointments. Recognize and accept your anxiety. Then, when you are in a situation that makes you anxious, say to yourself, \"This is not an emergency. I feel uncomfortable, but I am not in danger. I can keep going even if I feel anxious. \" 
Be kind to your body: 
Relieve tension with exercise or a massage. Get enough rest. 
Avoid alcohol, caffeine, nicotine, and illegal drugs. They can increase your anxiety level and cause sleep problems. Learn and do relaxation techniques. See below for more about these techniques. Engage your mind. Get out and do something you enjoy. Go to a funny movie, or take a walk or hike. Plan your day. Having too much or too little to do can make you anxious. Keep a record of your symptoms. Discuss your fears with a good friend or family member, or join a support group for people with similar problems. Talking to others sometimes relieves stress. Get involved in social groups, or volunteer to help others. Being alone sometimes makes things seem worse than they are. Get at least 30 minutes of exercise on most days of the week to relieve stress. Walking is a good choice. You also may want to do other activities, such as running, swimming, cycling, or playing tennis or team sports. Relaxation techniques Do relaxation exercises 10 to 20 minutes a day. You can play soothing, relaxing music while you do them, if you wish. Tell others in your house that you are going to do your relaxation exercises. Ask them not to disturb you. Find a comfortable place, away from all distractions and noise. Lie down on your back, or sit with your back straight. Focus on your breathing. Make it slow and steady. Breathe in through your nose. Breathe out through either your nose or mouth. Breathe deeply, filling up the area between your navel and your rib cage. Breathe so that your belly goes up and down. Do not hold your breath. Breathe like this for 5 to 10 minutes. Notice the feeling of calmness throughout your whole body. As you continue to breathe slowly and deeply, relax by doing the following for another 5 to 10 minutes: 
Tighten and relax each muscle group in your body. You can begin at your toes and work your way up to your head. Imagine your muscle groups relaxing and becoming heavy. Empty your mind of all thoughts. Let yourself relax more and more deeply. Become aware of the state of calmness that surrounds you. When your relaxation time is over, you can bring yourself back to alertness by moving your fingers and toes and then your hands and feet and then stretching and moving your entire body. Sometimes people fall asleep during relaxation, but they usually wake up shortly afterward. Always give yourself time to return to full alertness before you drive a car or do anything that might cause an accident if you are not fully alert. Never play a relaxation tape while you drive a car. When should you call for help? Call 911 anytime you think you may need emergency care. For example, call if: 
? You feel you cannot stop from hurting yourself or someone else. ? Keep the numbers for these national suicide hotlines: 6-928-288-TALK (2-124.160.1200) and 5-397-DCMOQTZ (9-255.307.9860). If you or someone you know talks about suicide or feeling hopeless, get help right away. ? Watch closely for changes in your health, and be sure to contact your doctor if: 
? You have anxiety or fear that affects your life. ? You have symptoms of anxiety that are new or different from those you had before. Where can you learn more? Go to http://shadi-louie.info/. Enter P754 in the search box to learn more about \"Anxiety Disorder: Care Instructions. \" Current as of: May 12, 2017 Content Version: 11.4 © 7292-3838 [employer]. Care instructions adapted under license by [employer] (which disclaims liability or warranty for this information). If you have questions about a medical condition or this instruction, always ask your healthcare professional. [address] any warranty or liability for your use of this information. Panic Attacks: Care Instructions Your Care Instructions During a panic attack, you may have a feeling of intense fear or terror, trouble breathing, chest pain or tightness, heartbeat changes, dizziness, sweating, and shaking. A panic attack starts suddenly and usually lasts from 5 to 20 minutes but may last even longer. You have the most anxiety about 10 minutes after the attack starts. An attack can begin with a stressful event, or it can happen without a cause. Although panic attacks can cause scary symptoms, you can learn to manage them with self-care, counseling, and medicine. Follow-up care is a key part of your treatment and safety. Be sure to make and go to all appointments, and call your doctor if you are having problems. It's also a good idea to know your test results and keep a list of the medicines you take. How can you care for yourself at home? Take your medicine exactly as directed. Call your doctor if you think you are having a problem with your medicine. Go to your counseling sessions and follow-up appointments. Recognize and accept your anxiety. Then, when you are in a situation that makes you anxious, say to yourself, \"This is not an emergency. I feel uncomfortable, but I am not in danger. I can keep going even if I feel anxious. \" 
Be kind to your body: 
Relieve tension with exercise or a massage.  
Get enough rest. 
 Avoid alcohol, caffeine, nicotine, and illegal drugs. They can increase your anxiety level, cause sleep problems, or trigger a panic attack. Learn and do relaxation techniques. See below for more about these techniques. Engage your mind. Get out and do something you enjoy. Go to a funny movie, or take a walk or hike. Plan your day. Having too much or too little to do can make you anxious. Keep a record of your symptoms. Discuss your fears with a good friend or family member, or join a support group for people with similar problems. Talking to others sometimes relieves stress. Get involved in social groups, or volunteer to help others. Being alone sometimes makes things seem worse than they are. Get at least 30 minutes of exercise on most days of the week to relieve stress. Walking is a good choice. You also may want to do other activities, such as running, swimming, cycling, or playing tennis or team sports. Relaxation techniques Do relaxation exercises for 10 to 20 minutes a day. You can play soothing, relaxing music while you do them, if you wish. Tell others in your house that you are going to do your relaxation exercises. Ask them not to disturb you. Find a comfortable place, away from all distractions and noise. Lie down on your back, or sit with your back straight. Focus on your breathing. Make it slow and steady. Breathe in through your nose. Breathe out through either your nose or mouth. Breathe deeply, filling up the area between your navel and your rib cage. Breathe so that your belly goes up and down. Do not hold your breath. Breathe like this for 5 to 10 minutes. Notice the feeling of calmness throughout your whole body. As you continue to breathe slowly and deeply, relax by doing the following for another 5 to 10 minutes: 
Tighten and relax each muscle group in your body. You can begin at your toes and work your way up to your head. Imagine your muscle groups relaxing and becoming heavy. Empty your mind of all thoughts. Let yourself relax more and more deeply. Become aware of the state of calmness that surrounds you. When your relaxation time is over, you can bring yourself back to alertness by moving your fingers and toes and then your hands and feet and then stretching and moving your entire body. Sometimes people fall asleep during relaxation, but they usually wake up shortly afterward. Always give yourself time to return to full alertness before you drive a car or do anything that might cause an accident if you are not fully alert. Never play a relaxation tape while driving a car. When should you call for help? Call 911 anytime you think you may need emergency care. For example, call if: 
? You feel you cannot stop from hurting yourself or someone else. ? Watch closely for changes in your health, and be sure to contact your doctor if: 
? Your panic attacks get worse. ? You have new or different anxiety. ? You are not getting better as expected. Where can you learn more? Go to http://shadi-louie.info/. Enter H601 in the search box to learn more about \"Panic Attacks: Care Instructions. \" Current as of: May 12, 2017 Content Version: 11.4 © 6401-9766 Healthwise, Incorporated. Care instructions adapted under license by [employer] (which disclaims liability or warranty for this information). If you have questions about a medical condition or this instruction, always ask your healthcare professional. [place] [address] any warranty or liability for your use of this information. · Introducing [facility]! Dear Jermaine Joyce: Thank you for requesting a [employer] account. Our records indicate that you already have an active [employer] account. You can access your account anytime at [email or web address] Did you know that you can access your hospital and ER discharge instructions at any time in [employer]? You can also review all of your test results from your hospital stay or ER visit. Additional Information If you have questions, please visit the Frequently Asked Questions section of the [employer] website at [email or web address] Remember, [employer] is NOT to be used for urgent needs. For medical emergencies, dial 911. Now available from your iPhone and Android! Please provide this summary of care documentation to your next provider. Your primary care clinician is listed as Dejan Cantu. If you have any questions after today's visit, please call [phone].

## 2018-03-26 ENCOUNTER — OFFICE VISIT (OUTPATIENT)
Dept: BEHAVIORAL/MENTAL HEALTH CLINIC | Age: 33
End: 2018-03-26

## 2018-03-26 VITALS
HEART RATE: 76 BPM | SYSTOLIC BLOOD PRESSURE: 173 MMHG | BODY MASS INDEX: 22.66 KG/M2 | HEIGHT: 73 IN | WEIGHT: 171 LBS | DIASTOLIC BLOOD PRESSURE: 106 MMHG

## 2018-03-26 DIAGNOSIS — F41.0 PANIC ATTACKS: ICD-10-CM

## 2018-03-26 DIAGNOSIS — F41.1 GAD (GENERALIZED ANXIETY DISORDER): ICD-10-CM

## 2018-03-26 RX ORDER — CITALOPRAM 40 MG/1
40 TABLET, FILM COATED ORAL DAILY
Qty: 30 TAB | Refills: 1 | Status: SHIPPED | OUTPATIENT
Start: 2018-03-26 | End: 2018-05-24 | Stop reason: SDUPTHER

## 2018-03-26 RX ORDER — SELENIUM SULFIDE 2.5 MG/100ML
LOTION TOPICAL
Refills: 99 | COMMUNITY
Start: 2018-03-08 | End: 2019-09-13 | Stop reason: ALTCHOICE

## 2018-03-26 NOTE — PROGRESS NOTES
Psychiatric Outpatient Progress Note    Account Number:  [de-identified]  Name: Ion Sr    SUBJECTIVE:   CHIEF COMPLAINT:  Ion Sr is a 28 y.o. male and was seen today for follow-up of psychiatric condition and psychotropic medication management. HPI:    Newton Peterson reports the following psychiatric symptoms:  anxiety and adjustment disorder with anxiety,. Medical h/o HT possibly dur to stress. . This is his first interaction with behavioral health. Client reported worries a lot all having a baby. Client reported that he has a stressful job and has high expectation with himself. Client reported has a current demanding job and is challenging job. Client is happily  and trying to have baby and client is very anxious about it and had occasional performance anxiety. The symptoms have been present for few months and are of moderate to high severity. The symptoms occur occasionally. Pt reported sleeping for 7-8 hrs and denied difficulty initiating and maintaining sleep. Worries a lot of trivial issues. Reported feels stressors about new responsibility of a child. Reported has interest, has fair appetite, has energy, motivation and focus and concentrate. Denied hopelessness or helpelesness or passive suicide thoughts. Is happy in marriage and does activities together with his spouse. Denied any symptoms os psychosis or vangie. Additional symptomatology include anxiety. The above symptoms have been present for a few months since they are planning to have a baby. The patient reports onset of symptoms since September. Reported has anxiety since young age. Kraig Jamil symptoms are of high severity as per patient's report. Contributing factors include:  trying to have a baby, stressful job. Patient denies SI/HI/SIB.      Side Effects:  none      Fam/Soc Hx (from [place] with updates):    Family History   Problem Relation Age of Onset    Hypertension Mother       Social History   Substance Use Topics    Smoking status: Never Smoker    Smokeless tobacco: Never Used    Alcohol use Yes      Comment: average 1 beer/day or less       REVIEW OF SYSTEMS:  Psychiatric:  anxiety. Appetite:good   Sleep: fitful                    Mental Status exam: WNL except for      Sensorium  oriented to time, place and person   Relations cooperative    Eye Contact    appropriate   Appearance:  age appropriate, casually dressed and well dressed   Motor Behavior/Gait:  gait stable and within normal limits   Speech:  normal pitch and normal volume   Thought Process: goal directed, logical and within normal limits   Thought Content free of delusions and free of hallucinations   Suicidal ideations no plan , no intention and none   Homicidal ideations no plan , no intention and none   Mood:  anxious   Affect:  anxious and mood-congruent   Memory recent  adequate   Memory remote:  adequate   Concentration:  adequate   Abstraction:  abstract   Insight:  fair   Reliability fair   Judgment:  fair       MEDICAL DECISION MAKING  Data: pertinent labs, imaging, medical records and diagnostic tests reviewed and incorporated in diagnosis and treatment plan    No Known Allergies     Current Outpatient Prescriptions   Medication Sig Dispense Refill    selenium sulfide 2.5 % lotion USE TO [address] SCALP/FACE DAILY  99    citalopram (CELEXA) 40 mg tablet Take 1 Tab by mouth daily. Indications: Generalized Anxiety Disorder 30 Tab 1    triamcinolone acetonide (KENALOG) 0.025 % ointment APPLY TO AFFECTED AREA ON GROIN/BUTTOCKS TWICE DAILY  1    sildenafil citrate (VIAGRA) 100 mg tablet Take 1 Tab by mouth as needed. 6 Tab 0    sildenafil citrate (VIAGRA) 25 mg tablet Take 1 Tab by mouth as needed. 6 Tab 0    nystatin-triamcinolone (MYCOLOG) 100,000-0.1 unit/gram-% ointment Apply  to affected area two (2) times a day.  30 g 0        Visit Vitals    BP (!) 173/106    Pulse 76    Ht 6' 1\" (1.854 m)    Wt 77.6 kg (171 lb)    BMI 22.56 kg/m2         Problems addressed today:  adjustment disorder with anxiety, generalized anxiety disorder    Assessment:  reviewed: appropriate use. Soundra Oppenheim  is a 28 y.o.  male  is partially responding to treatment. Symptoms are occurring occasionally. Today he reported that he is doing \"great\". \"Doing a lot better\". Reported his anxiety is under control. Reported taking mediation at bed time and is benefiting well. Client denied any  anxiety at work. Reported sleeping fitful, appetite has improved,  Denied any racing thoughts. Denied any symptoms of depression. Has energy, interest, motivation and able to focus and concentrate. Client hs not taken alprazolam. He is supporting his wife. Plan to continue citalopram to target anxiety. Continue [facility]. He is doing mindfulness meditation and exercise x 3 per week now. Reviewed labs and records. Patient denies SI/HI/SIB. No evidence of AH/VH or delusions. Understands the importance of psychotherapy in his treatment plan. Psychoeducation, medication teaching, co-morbid illness and pertinent health factors to manage care were discussed. Overall, patient is stable at this time but will require ongoing medication management. Possible organic causes contributing are:? HT    Risk Scoring- chronic illnesses and prescription drug management    Treatment Plan:  1. Medications:          Medication Changes/Adjustments: Continue alprazolam 0.25 mg prn daily HS-for panic attacks- none given                                                              Continue citalopram 40 mg daily                                                                Continue psychotherapy - marian                                                                     Current Outpatient Prescriptions   Medication Sig Dispense Refill    selenium sulfide 2.5 % lotion USE TO WASH SCALP/FACE DAILY  99    citalopram (CELEXA) 40 mg tablet Take 1 Tab by mouth daily.  Indications: Generalized Anxiety Disorder 30 Tab 1    triamcinolone acetonide (KENALOG) 0.025 % ointment APPLY TO AFFECTED AREA ON GROIN/BUTTOCKS TWICE DAILY  1    sildenafil citrate (VIAGRA) 100 mg tablet Take 1 Tab by mouth as needed. 6 Tab 0    sildenafil citrate (VIAGRA) 25 mg tablet Take 1 Tab by mouth as needed. 6 Tab 0    nystatin-triamcinolone (MYCOLOG) 100,000-0.1 unit/gram-% ointment Apply  to affected area two (2) times a day. 30 g 0                  The following regarding medications was addressed:    (The risks and benefits of the proposed medication; the potential medication side effects ie    dry mouth, weight gain, GI upset, headache; patient given opportunity to ask questions)       2. Counseling and coordination of care including instructions for treatment, risks/benefits, risk factor reduction and patient/family education. He agrees with the plan. Patient instructed to call with any side effects, questions or issues. Instructed patient to call the clinic, and if after hours call the provider on call ifclient experiences any suicidal thought or ideas to hurt self or other. Also instructed to call 911 or go to the ED. Patient verbalized understanding and agreed to call    3. Follow-up Disposition:  Return in about 2 months (around 5/26/2018) for med check and follow up. 4. Other: Nutritional/health counseling on diet and exercise. For reliable dietary information, go to www. [employer].org. PSYCHOTHERAPY:  approx 5-7 minutes  Type:  Supportive/Cognitive Behavioral psychotherapy provided  Focus:     Current problems- stressors related to conceiving. discussed relaxation techniques. Medical issues- HT   Psychoeducation provided  Treatment plan reviewed with patient-including diagnosis and medications    Clif Caba is progressing.     Yovana Fitzgerald NP  3/26/2018

## 2018-03-26 NOTE — PATIENT INSTRUCTIONS
Sleep Tips    What to avoid    · Do not have drinks with caffeine, such as coffee or black tea, for 8 hours before bed. · Do not smoke or use other types of tobacco near bedtime. Nicotine is a stimulant and can keep you awake. · Avoid drinking alcohol late in the evening, because it can cause you to wake in the middle of the night. · Do not eat a big meal close to bedtime. If you are hungry, eat a light snack. · Do not drink a lot of water close to bedtime, because the need to urinate may wake you up during the night. · Do not read or watch TV in bed. Use the bed only for sleeping and sexual activity. What to try    · Go to bed at the same time every night, and wake up at the same time every morning. Do not take naps during the day. · Keep your bedroom quiet, dark, and cool. · Get regular exercise, but not within 3 to 4 hours of your bedtime. · Sleep on a comfortable pillow and mattress. · If watching the clock makes you anxious, turn it facing away from you so you cannot see the time. · If you worry when you lie down, start a worry book. Well before bedtime, write down your worries, and then set the book and your concerns aside. · Try meditation or other relaxation techniques before you go to bed. · If you cannot fall asleep, get up and go to another room until you feel sleepy. Do something relaxing. Repeat your bedtime routine before you go to bed again. Make your house quiet and calm about an hour before bedtime. Turn down the lights, turn off the TV, log off the computer, and turn down the volume on music. This can help you relax after a busy day. Anxiety Disorder: Care Instructions  Your Care Instructions    Anxiety is a normal reaction to stress. Difficult situations can cause you to have symptoms such as sweaty palms and a nervous feeling. In an anxiety disorder, the symptoms are far more severe.  Constant worry, muscle tension, trouble sleeping, nausea and diarrhea, and other symptoms can make normal daily activities difficult or impossible. These symptoms may occur for no reason, and they can affect your work, school, or social life. Medicines, counseling, and self-care can all help. Follow-up care is a key part of your treatment and safety. Be sure to make and go to all appointments, and call your doctor if you are having problems. It's also a good idea to know your test results and keep a list of the medicines you take. How can you care for yourself at home? Take medicines exactly as directed. Call your doctor if you think you are having a problem with your medicine. Go to your counseling sessions and follow-up appointments. Recognize and accept your anxiety. Then, when you are in a situation that makes you anxious, say to yourself, \"This is not an emergency. I feel uncomfortable, but I am not in danger. I can keep going even if I feel anxious. \"  Be kind to your body:  Relieve tension with exercise or a massage. Get enough rest.  Avoid alcohol, caffeine, nicotine, and illegal drugs. They can increase your anxiety level and cause sleep problems. Learn and do relaxation techniques. See below for more about these techniques. Engage your mind. Get out and do something you enjoy. Go to a [employer] movie, or take a walk or hike. Plan your day. Having too much or too little to do can make you anxious. Keep a record of your symptoms. Discuss your fears with a good friend or family member, or join a support group for people with similar problems. Talking to others sometimes relieves stress. Get involved in social groups, or volunteer to help others. Being alone sometimes makes things seem worse than they are. Get at least 30 minutes of exercise on most days of the week to relieve stress. Walking is a good choice. You also may want to do other activities, such as running, swimming, cycling, or playing tennis or team sports. Relaxation techniques  Do relaxation exercises 10 to 20 minutes a day.  You can play soothing, relaxing music while you do them, if you wish. Tell others in your house that you are going to do your relaxation exercises. Ask them not to disturb you. Find a comfortable place, away from all distractions and noise. Lie down on your back, or sit with your back straight. Focus on your breathing. Make it slow and steady. Breathe in through your nose. Breathe out through either your nose or mouth. Breathe deeply, filling up the area between your navel and your rib cage. Breathe so that your belly goes up and down. Do not hold your breath. Breathe like this for 5 to 10 minutes. Notice the feeling of calmness throughout your whole body. As you continue to breathe slowly and deeply, relax by doing the following for another 5 to 10 minutes:  Tighten and relax each muscle group in your body. You can begin at your toes and work your way up to your head. Imagine your muscle groups relaxing and becoming heavy. Empty your mind of all thoughts. Let yourself relax more and more deeply. Become aware of the state of calmness that surrounds you. When your relaxation time is over, you can bring yourself back to alertness by moving your fingers and toes and then your hands and feet and then stretching and moving your entire body. Sometimes people fall asleep during relaxation, but they usually wake up shortly afterward. Always give yourself time to return to full alertness before you drive a car or do anything that might cause an accident if you are not fully alert. Never play a relaxation tape while you drive a car. When should you call for help? Call 911 anytime you think you may need emergency care. For example, call if:  ? You feel you cannot stop from hurting yourself or someone else. ? Keep the numbers for these national suicide hotlines: 9-912-096-TALK (7-333.638.3852) and 8-963-TRNIJUP (0-461.103.7839).  If you or someone you know talks about suicide or feeling hopeless, get help right away. ? Watch closely for changes in your health, and be sure to contact your doctor if:  ? You have anxiety or fear that affects your life. ? You have symptoms of anxiety that are new or different from those you had before. Where can you learn more? Go to http://shadi-louie.info/. Enter P754 in the search box to learn more about \"Anxiety Disorder: Care Instructions. \"  Current as of: May 12, 2017  Content Version: 11.4  © 1766-8240 [employer]. Care instructions adapted under license by [employer] (which disclaims liability or warranty for this information). If you have questions about a medical condition or this instruction, always ask your healthcare professional. [address] any warranty or liability for your use of this information.   ·

## 2018-03-26 NOTE — MR AVS SNAPSHOT
[address] [place] [address] 
[phone] Patient: Agusto Reddy MRN: [id number] :1985 Visit Information Date & Time Provider Department Dept. Phone Encounter #  
 3/26/2018  1:00 PM Jeannine Treviño, [facility] [phone] [id number] Follow-up Instructions Return in about 2 months (around 2018) for med check and follow up. Follow-up and Disposition History Your Appointments 2018  1:45 PM  
Any with Sina Kenney MD  
[address] [place] [address] and Primary Care [address] [place] [address]) Appt Note: f/up for 4 months [address] [facility]  
  
   
 [address] [id number] Upcoming Health Maintenance Date Due DTaP/Tdap/Td series (2 - Td) 2027 Allergies as of 3/26/2018  Review Complete On: 3/26/2018 By: Jeannine Treviño NP No Known Allergies Current Immunizations  Never Reviewed Name Date Influenza Vaccine (Quad) PF 10/13/2017, 2016 Not reviewed this visit You Were Diagnosed With   
  
 Codes Comments OCTAVIO (generalized anxiety disorder)     ICD-10-CM: F41.1 ICD-9-CM: 300.02 Panic attacks     ICD-10-CM: F41.0 ICD-9-CM: 300.01 Vitals BP Pulse Height(growth percentile) Weight(growth percentile) BMI Smoking Status (!) 173/106 76 6' 1\" (1.854 m) 171 lb (77.6 kg) 22.56 kg/m2 Never Smoker Vitals History BMI and BSA Data Body Mass Index Body Surface Area  
 22.56 kg/m 2 2 m 2 Preferred Pharmacy Pharmacy Name Phone [id number] [phone] Your Updated Medication List  
  
   
This list is accurate as of 3/26/18  1:15 PM.  Always use your most recent med list.  
  
  
  
  
 citalopram 40 mg tablet Commonly known as:  Claudine Ill Take 1 Tab by mouth daily. Indications: Generalized Anxiety Disorder  
  
 nystatin-triamcinolone 100,000-0.1 unit/gram-% ointment Commonly known as:  Ricco Brennan Apply  to affected area two (2) times a day. selenium sulfide 2.5 % lotion USE TO [facility] SCALP/FACE DAILY * sildenafil citrate 25 mg tablet Commonly known as:  VIAGRA Take 1 Tab by mouth as needed. * sildenafil citrate 100 mg tablet Commonly known as:  VIAGRA Take 1 Tab by mouth as needed. triamcinolone acetonide 0.025 % ointment Commonly known as:  KENALOG  
APPLY TO AFFECTED AREA ON GROIN/BUTTOCKS TWICE DAILY * Notice: This list has 2 medication(s) that are the same as other medications prescribed for you. Read the directions carefully, and ask your doctor or other care provider to review them with you. Prescriptions Sent to Pharmacy Refills  
 citalopram (CELEXA) 40 mg tablet 1 Sig: Take 1 Tab by mouth daily. Indications: Generalized Anxiety Disorder Class: Normal  
 Pharmacy: [address] Ph #: [phone] Route: Oral  
  
Follow-up Instructions Return in about 2 months (around 5/26/2018) for med check and follow up. Patient Instructions Sleep Tips What to avoid   
· Do not have drinks with caffeine, such as coffee or black tea, for 8 hours before bed. · Do not smoke or use other types of tobacco near bedtime. Nicotine is a stimulant and can keep you awake. · Avoid drinking alcohol late in the evening, because it can cause you to wake in the middle of the night. · Do not eat a big meal close to bedtime. If you are hungry, eat a light snack. · Do not drink a lot of water close to bedtime, because the need to urinate may wake you up during the night. · Do not read or watch TV in bed. Use the bed only for sleeping and sexual activity.  
What to try   
· Go to bed at the same time every night, and wake up at the same time every morning. Do not take naps during the day. · Keep your bedroom quiet, dark, and cool. · Get regular exercise, but not within 3 to 4 hours of your bedtime. · Sleep on a comfortable pillow and mattress. · If watching the clock makes you anxious, turn it facing away from you so you cannot see the time. · If you worry when you lie down, start a worry book. Well before bedtime, write down your worries, and then set the book and your concerns aside. · Try meditation or other relaxation techniques before you go to bed. · If you cannot fall asleep, get up and go to another room until you feel sleepy. Do something relaxing. Repeat your bedtime routine before you go to bed again. Make your house quiet and calm about an hour before bedtime. Turn down the lights, turn off the TV, log off the computer, and turn down the volume on music. This can help you relax after a busy day. Anxiety Disorder: Care Instructions Your Care Instructions Anxiety is a normal reaction to stress. Difficult situations can cause you to have symptoms such as sweaty palms and a nervous feeling. In an anxiety disorder, the symptoms are far more severe. Constant worry, muscle tension, trouble sleeping, nausea and diarrhea, and other symptoms can make normal daily activities difficult or impossible. These symptoms may occur for no reason, and they can affect your work, school, or social life. Medicines, counseling, and self-care can all help. Follow-up care is a key part of your treatment and safety. Be sure to make and go to all appointments, and call your doctor if you are having problems. It's also a good idea to know your test results and keep a list of the medicines you take. How can you care for yourself at home? Take medicines exactly as directed. Call your doctor if you think you are having a problem with your medicine. Go to your counseling sessions and follow-up appointments. Recognize and accept your anxiety. Then, when you are in a situation that makes you anxious, say to yourself, \"This is not an emergency. I feel uncomfortable, but I am not in danger. I can keep going even if I feel anxious. \" 
Be kind to your body: 
Relieve tension with exercise or a massage. Get enough rest. 
Avoid alcohol, caffeine, nicotine, and illegal drugs. They can increase your anxiety level and cause sleep problems. Learn and do relaxation techniques. See below for more about these techniques. Engage your mind. Get out and do something you enjoy. Go to a [employer] movie, or take a walk or hike. Plan your day. Having too much or too little to do can make you anxious. Keep a record of your symptoms. Discuss your fears with a good friend or family member, or join a support group for people with similar problems. Talking to others sometimes relieves stress. Get involved in social groups, or volunteer to help others. Being alone sometimes makes things seem worse than they are. Get at least 30 minutes of exercise on most days of the week to relieve stress. Walking is a good choice. You also may want to do other activities, such as running, swimming, cycling, or playing tennis or team sports. Relaxation techniques Do relaxation exercises 10 to 20 minutes a day. You can play soothing, relaxing music while you do them, if you wish. Tell others in your house that you are going to do your relaxation exercises. Ask them not to disturb you. Find a comfortable place, away from all distractions and noise. Lie down on your back, or sit with your back straight. Focus on your breathing. Make it slow and steady. Breathe in through your nose. Breathe out through either your nose or mouth. Breathe deeply, filling up the area between your navel and your rib cage. Breathe so that your belly goes up and down. Do not hold your breath. Breathe like this for 5 to 10 minutes.  Notice the feeling of calmness throughout your whole body. As you continue to breathe slowly and deeply, relax by doing the following for another 5 to 10 minutes: 
Tighten and relax each muscle group in your body. You can begin at your toes and work your way up to your head. Imagine your muscle groups relaxing and becoming heavy. Empty your mind of all thoughts. Let yourself relax more and more deeply. Become aware of the state of calmness that surrounds you. When your relaxation time is over, you can bring yourself back to alertness by moving your fingers and toes and then your hands and feet and then stretching and moving your entire body. Sometimes people fall asleep during relaxation, but they usually wake up shortly afterward. Always give yourself time to return to full alertness before you drive a car or do anything that might cause an accident if you are not fully alert. Never play a relaxation tape while you drive a car. When should you call for help? Call 911 anytime you think you may need emergency care. For example, call if: 
? You feel you cannot stop from hurting yourself or someone else. ? Keep the numbers for these national suicide hotlines: 4-657-867-TALK (5-599-958-295.931.8905) and 0-854-SKTGABQ (2-145.361.3486). If you or someone you know talks about suicide or feeling hopeless, get help right away. ? Watch closely for changes in your health, and be sure to contact your doctor if: 
? You have anxiety or fear that affects your life. ? You have symptoms of anxiety that are new or different from those you had before. Where can you learn more? Go to http://shadi-louie.info/. Enter P754 in the search box to learn more about \"Anxiety Disorder: Care Instructions. \" Current as of: May 12, 2017 Content Version: 11.4 © 6903-1425 Healthwise, Incorporated.  Care instructions adapted under license by [employer] (which disclaims liability or warranty for this information). If you have questions about a medical condition or this instruction, always ask your healthcare professional. [address] any warranty or liability for your use of this information. ·  
 
 
 Patient Instructions History Introducing [facility] & HEALTH SERVICES! Dear Penny Jackson: Thank you for requesting a "[employer] account. Our records indicate that you already have an active "[employer] account. You can access your account anytime at [email or web address] Did you know that you can access your hospital and ER discharge instructions at any time in "[employer]? You can also review all of your test results from your hospital stay or ER visit. Additional Information If you have questions, please visit the Frequently Asked Questions section of the "[employer] website at [email or web address] Remember, "[employer] is NOT to be used for urgent needs. For medical emergencies, dial 911. Now available from your iPhone and Android! Please provide this summary of care documentation to your next provider. Your primary care clinician is listed as Milli Garber. If you have any questions after today's visit, please call [phone].

## 2018-04-24 ENCOUNTER — HOSPITAL ENCOUNTER (OUTPATIENT)
Dept: LAB | Age: 33
Discharge: HOME | End: 2018-04-24

## 2018-05-18 ENCOUNTER — OFFICE VISIT (OUTPATIENT)
Dept: INTERNAL MEDICINE CLINIC | Age: 33
End: 2018-05-18

## 2018-05-18 VITALS
HEIGHT: 73 IN | WEIGHT: 176.7 LBS | DIASTOLIC BLOOD PRESSURE: 90 MMHG | OXYGEN SATURATION: 95 % | RESPIRATION RATE: 16 BRPM | BODY MASS INDEX: 23.42 KG/M2 | HEART RATE: 84 BPM | SYSTOLIC BLOOD PRESSURE: 142 MMHG | TEMPERATURE: 98.1 F

## 2018-05-18 DIAGNOSIS — R03.0 ELEVATED BP WITHOUT DIAGNOSIS OF HYPERTENSION: Primary | ICD-10-CM

## 2018-05-18 DIAGNOSIS — F41.9 ANXIETY: ICD-10-CM

## 2018-05-18 DIAGNOSIS — Z23 ENCOUNTER FOR IMMUNIZATION: ICD-10-CM

## 2018-05-18 NOTE — MR AVS SNAPSHOT
303 [facility] 
 
 
 [address] [id number] 
[phone] Patient: Nena Palacio MRN: [id number] :1985 Visit Information Date & Time Provider Department Dept. Phone Encounter #  
 2018  1:45 PM [address] [place] [address] MD Dotty [facility] and [place] [address] [id number] Follow-up Instructions Return in about 6 months (around 2018) for anxiety. Follow-up and Disposition History Your Appointments 2018  1:00 PM  
ESTABLISHED PATIENT with Everardo Romero NP [facility] Appt Note: 2 month follow-up [address] [facility] 
[address]  
[phone]  
  
   
 [address] [facility] [address] [address]  
  
    
 2018  3:15 PM  
Any with [address] [place] [address] MD Dotty  
[address] [place] [address] and Primary Care [facility]) Appt Note: 4 Month Follow Up  
 [address] 1 [facility]  
  
   
 [address] [id number] Upcoming Health Maintenance Date Due Influenza Age 5 to Adult 2018 DTaP/Tdap/Td series (2 - Td) 2027 Allergies as of 2018  Review Complete On: 2018 By: [address] [place] [address] MD Dotty  
 No Known Allergies Current Immunizations  Never Reviewed Name Date Influenza Vaccine (Quad) PF 10/13/2017, 2016 Tdap 2018 Not reviewed this visit You Were Diagnosed With   
  
 Codes Comments Elevated BP without diagnosis of hypertension    -  Primary ICD-10-CM: R03.0 ICD-9-CM: 796.2 Anxiety     ICD-10-CM: F41.9 ICD-9-CM: 300.00 Encounter for immunization     ICD-10-CM: K95 ICD-9-CM: V03.89 Vitals BP Pulse Temp Resp Height(growth percentile) Weight(growth percentile) 142/90 (BP 1 Location: Right arm, BP Patient Position: Sitting) 84 98.1 °F (36.7 °C) (Oral) 16 6' 1\" (1.854 m) 176 lb 11.2 oz (80.2 kg) SpO2 BMI Smoking Status 95% 23.31 kg/m2 Never Smoker Vitals History BMI and BSA Data Body Mass Index Body Surface Area  
 23.31 kg/m 2 2.03 m 2 Preferred Pharmacy [facility] Phone [id number] [phone] Your Updated Medication List  
  
   
This list is accurate as of 5/18/18  4:41 PM.  Always use your most recent med list.  
  
  
  
  
 citalopram 40 mg tablet Commonly known as:  Kevin Hopes Take 1 Tab by mouth daily. Indications: Generalized Anxiety Disorder  
  
 nystatin-triamcinolone 100,000-0.1 unit/gram-% ointment Commonly known as:  Meme Caulk Apply  to affected area two (2) times a day. selenium sulfide 2.5 % lotion USE TO [facility] SCALP/FACE DAILY * sildenafil citrate 25 mg tablet Commonly known as:  VIAGRA Take 1 Tab by mouth as needed. * sildenafil citrate 100 mg tablet Commonly known as:  VIAGRA Take 1 Tab by mouth as needed. triamcinolone acetonide 0.025 % ointment Commonly known as:  KENALOG  
APPLY TO AFFECTED AREA ON GROIN/BUTTOCKS TWICE DAILY * Notice: This list has 2 medication(s) that are the same as other medications prescribed for you. Read the directions carefully, and ask your doctor or other care provider to review them with you. We Performed the Following RI IMMUNIZ ADMIN,1 SINGLE/COMB VAC/TOXOID [id number] CPT(R)] TETANUS, DIPHTHERIA TOXOIDS AND ACELLULAR PERTUSSIS VACCINE (TDAP), IN INDIVIDS. >=7, IM [id number] CPT(R)] Follow-up Instructions Return in about 6 months (around 11/18/2018) for anxiety. Introducing [facility] & HEALTH SERVICES! Dear Kavya Bhardwaj: Thank you for requesting a [employer] account. Our records indicate that you already have an active [employer] account. You can access your account anytime at [email or web address] Did you know that you can access your hospital and ER discharge instructions at any time in [employer]? You can also review all of your test results from your hospital stay or ER visit. Additional Information If you have questions, please visit the Frequently Asked Questions section of the [employer] website at [email or web address] "[email or web address]"/[email or web address] Gl[employer] Networkst/. Remember, [employer] is NOT to be used for urgent needs. For medical emergencies, dial 911. Now available from your iPhone and Android! Please provide this summary of care documentation to your next provider. Your primary care clinician is listed as Tiffanie Mejia. If you have any questions after today's visit, please call [phone].

## 2018-05-18 NOTE — PROGRESS NOTES
Chief Complaint   Patient presents with    Blood Pressure Check     Pt who presents for follow up of a pre-existing problem of hypertension. Diet and Lifestyle: generally follows a low fat low cholesterol diet, generally follows a low sodium diet, exercises regularly, nonsmoker  Home BP Monitoring: is well controlled at home, ranging 120's/80's  Use of agents associated with hypertension: none. Cardiovascular ROS: taking medications as instructed, no medication side effects noted, no TIA's, no chest pain on exertion, no dyspnea on exertion, no swelling of ankles. New concerns: none. Reviewed and agree with Nurse Note and duplicated in this note. Reviewed PmHx, RxHx, FmHx, SocHx, AllgHx and updated and dated in the chart. Family History   Problem Relation Age of Onset    Hypertension Mother      No past medical history on file.    Social History     Social History    Marital status:      Spouse name: N/A    Number of children: N/A    Years of education: N/A     Social History Main Topics    Smoking status: Never Smoker    Smokeless tobacco: Never Used    Alcohol use Yes      Comment: average 1 beer/day or less    Drug use: No    Sexual activity: Not on file     Other Topics Concern    Not on file     Social History Narrative        Review of Systems - negative except as listed above      Objective:     Vitals:    05/18/18 1346   BP: 142/90   Pulse: 84   Resp: 16   Temp: 98.1 °F (36.7 °C)   TempSrc: Oral   SpO2: 95%   Weight: 176 lb 11.2 oz (80.2 kg)   Height: 6' 1\" (1.854 m)     normal rate, regular rhythm, normal S1, S2, no murmurs, rubs, clicks or gallopsPhysical Examination: General appearance - alert, well appearing, and in no distress  Eyes - pupils equal and reactive, extraocular eye movements intact  Ears - bilateral TM's and external ear canals normal  Nose - normal and patent, no erythema, discharge or polyps  Mouth - mucous membranes moist, pharynx normal without lesions  Neck - supple, no significant adenopathy  Chest - clear to auscultation, no wheezes, rales or rhonchi, symmetric air entry  Heart - normal rate, regular rhythm, normal S1, S2, no murmurs, rubs, clicks or gallops  Abdomen - soft, nontender, nondistended, no masses or organomegaly  Neurological - alert, oriented, normal speech, no focal findings or movement disorder noted  Musculoskeletal - no joint tenderness, deformity or swelling  Extremities - peripheral pulses normal, no pedal edema, no clubbing or cyanosis  Skin - normal coloration and turgor, no rashes, no suspicious skin lesions noted    Assessment/ Plan:   Diagnoses and all orders for this visit:    1. Elevated BP without diagnosis of hypertension    2. Anxiety     Follow-up Disposition: Not on File  Patient was informed/counseled to:    1) Remember to stay active and/or exercise regularly (I suggest 30-45 minutes daily)   2) For reliable dietary information, go to www. EATRIGHT.org. You may wish to consider seeing the nutritionist at [facility] [phone], also consider the [address] [address]. 3) I routinely suggest a complete physical exam once each year (your birth month)  I have discussed the diagnosis with the patient and the intended plan as seen in the above orders. The patient has received an after-visit summary and questions were answered concerning future plans. Medication Side Effects and Warnings were discussed with patient: yes  Patient Labs were reviewed and or requested: yes  Patient Past Records were reviewed and or requested  yes  I have discussed the diagnosis with the patient and the intended plan as seen in the above orders. The patient has received an after-visit summary and questions were answered concerning future plans. Pt agrees to call or return to clinic and/or go to closest ER with any worsening of symptoms.   This may include, but not limited to increased fever (>100.4) with NSAIDS or Tylenol, increased edema, confusion, rash, worsening of presenting symptoms.

## 2018-05-24 ENCOUNTER — OFFICE VISIT (OUTPATIENT)
Dept: BEHAVIORAL/MENTAL HEALTH CLINIC | Age: 33
End: 2018-05-24

## 2018-05-24 VITALS
TEMPERATURE: 98.4 F | SYSTOLIC BLOOD PRESSURE: 154 MMHG | BODY MASS INDEX: 23.33 KG/M2 | OXYGEN SATURATION: 99 % | DIASTOLIC BLOOD PRESSURE: 104 MMHG | HEIGHT: 73 IN | HEART RATE: 61 BPM | WEIGHT: 176 LBS | RESPIRATION RATE: 16 BRPM

## 2018-05-24 DIAGNOSIS — F41.1 GAD (GENERALIZED ANXIETY DISORDER): ICD-10-CM

## 2018-05-24 DIAGNOSIS — F41.0 PANIC ATTACKS: ICD-10-CM

## 2018-05-24 RX ORDER — KETOCONAZOLE 20 MG/ML
SHAMPOO TOPICAL
Refills: 99 | COMMUNITY
Start: 2018-03-08 | End: 2019-09-13 | Stop reason: ALTCHOICE

## 2018-05-24 RX ORDER — PIMECROLIMUS 10 MG/G
CREAM TOPICAL
Refills: 99 | COMMUNITY
Start: 2018-05-08 | End: 2019-09-13 | Stop reason: ALTCHOICE

## 2018-05-24 RX ORDER — CITALOPRAM 40 MG/1
40 TABLET, FILM COATED ORAL DAILY
Qty: 90 TAB | Refills: 0 | Status: SHIPPED | OUTPATIENT
Start: 2018-05-24 | End: 2018-08-13 | Stop reason: SDUPTHER

## 2018-05-24 NOTE — PROGRESS NOTES
Psychiatric Outpatient Progress Note    Account Number:  [de-identified]  Name: Pedro Szymanski    SUBJECTIVE:   CHIEF COMPLAINT:  Pedro Szymanski is a 28 y.o. male and was seen today for follow-up of psychiatric condition and psychotropic medication management. HPI:    Criss Lamaskristopherchristopher reports the following psychiatric symptoms:  anxiety and adjustment disorder with anxiety,. Medical h/o HT possibly dur to stress. . This is his first interaction with behavioral health. Client reported worries a lot all having a baby. Client reported that he has a stressful job and has high expectation with himself. Client reported has a current demanding job and is challenging job. Client is happily  and trying to have baby and client is very anxious about it and had occasional performance anxiety. Client had anxiety and had benefits with citalopram . His wife is pregnant. The symptoms have been present for few months and are of mild to moderate severity now. The symptoms occur infrequently. . Pt reported sleeping for 7-8 hrs and sleeping fitful. Reported feels stressors about new responsibility of a child. Reported has interest, has fair appetite, has energy, motivation and focus and concentrate. Denied hopelessness or helpelesness or passive suicide thoughts. Is happy in marriage and does activities together with his spouse. Denied any symptoms os psychosis or vangie. Additional symptomatology include anxiety. The above symptoms have been present for a few months since they are planning to have a baby. The patient reports onset of symptoms since September. Reported has anxiety since young age.      Contributing factors include: Wife is pregnant, stressful job. Patient denies SI/HI/SIB.      Side Effects:  none      Fam/Soc Hx (from [place] with updates):    Family History   Problem Relation Age of Onset    Hypertension Mother       Social History   Substance Use Topics    Smoking status: Never Smoker    Smokeless tobacco: Never Used    Alcohol use Yes      Comment: average 1 beer/day or less       REVIEW OF SYSTEMS:  Psychiatric:  anxiety. Appetite:good   Sleep: fitful                    Mental Status exam: WNL except for      Sensorium  oriented to time, place and person   Relations cooperative    Eye Contact    appropriate   Appearance:  age appropriate, casually dressed and well dressed   Motor Behavior/Gait:  gait stable and within normal limits   Speech:  normal pitch and normal volume   Thought Process: goal directed, logical and within normal limits   Thought Content free of delusions and free of hallucinations   Suicidal ideations no plan , no intention and none   Homicidal ideations no plan , no intention and none   Mood:  euthymic   Affect:  euthymic and mood-congruent   Memory recent  adequate   Memory remote:  adequate   Concentration:  adequate   Abstraction:  abstract   Insight:  fair   Reliability fair   Judgment:  fair       MEDICAL DECISION MAKING  Data: pertinent labs, imaging, medical records and diagnostic tests reviewed and incorporated in diagnosis and treatment plan    No Known Allergies     Current Outpatient Prescriptions   Medication Sig Dispense Refill    ketoconazole (NIZORAL) 2 % shampoo WASH SCALP/ FACE DAILY  99    ELIDEL 1 % topical cream APPLY TO AFFECTED AREA(S) OF GROIN/BUTTOCKS AS DIRECTED, TWICE A DAY  99    citalopram (CELEXA) 40 mg tablet Take 1 Tab by mouth daily. Indications: Generalized Anxiety Disorder 90 Tab 0    selenium sulfide 2.5 % lotion USE TO WASH SCALP/FACE DAILY  99    triamcinolone acetonide (KENALOG) 0.025 % ointment APPLY TO AFFECTED AREA ON GROIN/BUTTOCKS TWICE DAILY  1    nystatin-triamcinolone (MYCOLOG) 100,000-0.1 unit/gram-% ointment Apply  to affected area two (2) times a day. 30 g 0    sildenafil citrate (VIAGRA) 100 mg tablet Take 1 Tab by mouth as needed. 6 Tab 0    sildenafil citrate (VIAGRA) 25 mg tablet Take 1 Tab by mouth as needed.  6 Tab 0        Visit Vitals  BP (!) 154/104 (BP 1 Location: Left arm, BP Patient Position: Sitting)    Pulse 61    Temp 98.4 °F (36.9 °C) (Oral)    Resp 16    Ht 6' 1\" (1.854 m)    Wt 79.8 kg (176 lb)    SpO2 99%    BMI 23.22 kg/m2         Problems addressed today:  adjustment disorder with anxiety, generalized anxiety disorder, performance anxiety. Assessment:  reviewed: appropriate use. Sidney Lo  is a 28 y.o.  male  is partially responding to treatment. Symptoms are occurring occasionally. Today he reported that he is doing \"great\". \"Doing a lot better\". His wife is 20 weeks pregnant and is worried about her next appointment. Reported his anxiety is under control. Denied any panic attacks. Reported taking mediation at bed time and is benefiting well. Client denied any  anxiety at work. Reported sleeping fitful, appetite is good, denied any racing thoughts. Denied any symptoms of depression. Has energy, interest, motivation and able to focus and concentrate. Client has not taken alprazolam. He is supportive to his wife. Plan to continue citalopram to target anxiety. Continue psychotherapy with [employer] [facility] [employer]. Reviewed labs and records. Patient denies SI/HI/SIB. No evidence of AH/VH or delusions. Understands the importance of psychotherapy in his treatment plan. Psychoeducation, medication teaching, co-morbid illness and pertinent health factors to manage care were discussed. Overall, patient is stable at this time but will require ongoing medication management. He is undergoing therapy every 2-3 months with [facility]. Possible organic causes contributing are:? HT    Risk Scoring- chronic illnesses and prescription drug management    Treatment Plan:  1.   Medications:          Medication Changes/Adjustments: Continue alprazolam 0.25 mg prn daily HS-for panic attacks- none given- has 30 tabs                                                               Continue citalopram 40 mg daily Continue psychotherapy - [facility]                                                                     Current Outpatient Prescriptions   Medication Sig Dispense Refill    ketoconazole (NIZORAL) 2 % shampoo WASH SCALP/ FACE DAILY  99    ELIDEL 1 % topical cream APPLY TO AFFECTED AREA(S) OF GROIN/BUTTOCKS AS DIRECTED, TWICE A DAY  99    citalopram (CELEXA) 40 mg tablet Take 1 Tab by mouth daily. Indications: Generalized Anxiety Disorder 90 Tab 0    selenium sulfide 2.5 % lotion USE TO WASH SCALP/FACE DAILY  99    triamcinolone acetonide (KENALOG) 0.025 % ointment APPLY TO AFFECTED AREA ON GROIN/BUTTOCKS TWICE DAILY  1    nystatin-triamcinolone (MYCOLOG) 100,000-0.1 unit/gram-% ointment Apply  to affected area two (2) times a day. 30 g 0    sildenafil citrate (VIAGRA) 100 mg tablet Take 1 Tab by mouth as needed. 6 Tab 0    sildenafil citrate (VIAGRA) 25 mg tablet Take 1 Tab by mouth as needed. 6 Tab 0                  The following regarding medications was addressed:    (The risks and benefits of the proposed medication; the potential medication side effects ie    dry mouth, weight gain, GI upset, headache; patient given opportunity to ask questions)       2. Counseling and coordination of care including instructions for treatment, risks/benefits, risk factor reduction and patient/family education. He agrees with the plan. Patient instructed to call with any side effects, questions or issues. Instructed patient to call the clinic, and if after hours call the provider on call ifclient experiences any suicidal thought or ideas to hurt self or other. Also instructed to call 911 or go to the ED. Patient verbalized understanding and agreed to call    3. Follow-up Disposition:  Return in about 6 months (around 11/24/2018) for med check and follow up. 4. Other: Nutritional/health counseling on diet and exercise.  For reliable dietary information, go to www.[employer]AT[employer].org. PSYCHOTHERAPY:  approx 5-7 minutes  Type:  Supportive/Cognitive Behavioral psychotherapy provided  Focus:     Current problems- stressors related to wife's pregnany- first regnancy. discussed relaxation techniques. Medical issues- HT   Psychoeducation provided  Treatment plan reviewed with patient-including diagnosis and medications    [facility] is progressing.     Jevon Montoya NP  5/24/2018

## 2018-05-24 NOTE — PROGRESS NOTES
Gm Lipoma is a 28 y.o. male  Chief Complaint   Patient presents with    Mental Health Problem     OCTAVIO 2 month follow up appointment     1. Have you been to the ER, urgent care clinic since your last visit? Hospitalized since your last visit? No     2. Have you seen or consulted any other health care providers outside of the [address] [place] [address] since your last visit? Include any pap smears or colon screening. No     Visit Vitals    BP (!) 154/104 (BP 1 Location: Left arm, BP Patient Position: Sitting)    Pulse 61    Temp 98.4 °F (36.9 °C) (Oral)    Resp 16    Ht 6' 1\" (1.854 m)    Wt 79.8 kg (176 lb)    SpO2 99%    BMI 23.22 kg/m2     Patient reports monitoring BP at home and PCP is also monitoring. No HTN meds have been started due to patient only having elevated BP at doctors offices.

## 2018-05-24 NOTE — PATIENT INSTRUCTIONS
Sleep Tips    What to avoid    · Do not have drinks with caffeine, such as coffee or black tea, for 8 hours before bed. · Do not smoke or use other types of tobacco near bedtime. Nicotine is a stimulant and can keep you awake. · Avoid drinking alcohol late in the evening, because it can cause you to wake in the middle of the night. · Do not eat a big meal close to bedtime. If you are hungry, eat a light snack. · Do not drink a lot of water close to bedtime, because the need to urinate may wake you up during the night. · Do not read or watch TV in bed. Use the bed only for sleeping and sexual activity. What to try    · Go to bed at the same time every night, and wake up at the same time every morning. Do not take naps during the day. · Keep your bedroom quiet, dark, and cool. · Get regular exercise, but not within 3 to 4 hours of your bedtime. · Sleep on a comfortable pillow and mattress. · If watching the clock makes you anxious, turn it facing away from you so you cannot see the time. · If you worry when you lie down, start a worry book. Well before bedtime, write down your worries, and then set the book and your concerns aside. · Try meditation or other relaxation techniques before you go to bed. · If you cannot fall asleep, get up and go to another room until you feel sleepy. Do something relaxing. Repeat your bedtime routine before you go to bed again. · Make your house quiet and calm about an hour before bedtime. Turn down the lights, turn off the TV, log off the computer, and turn down the volume on music. This can help you relax after a busy day.

## 2018-08-13 ENCOUNTER — OFFICE VISIT (OUTPATIENT)
Dept: BEHAVIORAL/MENTAL HEALTH CLINIC | Age: 33
End: 2018-08-13

## 2018-08-13 VITALS
DIASTOLIC BLOOD PRESSURE: 108 MMHG | TEMPERATURE: 98.1 F | SYSTOLIC BLOOD PRESSURE: 143 MMHG | RESPIRATION RATE: 19 BRPM | OXYGEN SATURATION: 98 % | HEART RATE: 65 BPM | BODY MASS INDEX: 24.36 KG/M2 | WEIGHT: 183.8 LBS | HEIGHT: 73 IN

## 2018-08-13 DIAGNOSIS — F41.0 PANIC ATTACKS: ICD-10-CM

## 2018-08-13 DIAGNOSIS — F43.22 ADJUSTMENT DISORDER WITH ANXIETY: Primary | ICD-10-CM

## 2018-08-13 DIAGNOSIS — F41.1 GAD (GENERALIZED ANXIETY DISORDER): ICD-10-CM

## 2018-08-13 RX ORDER — CITALOPRAM 40 MG/1
40 TABLET, FILM COATED ORAL DAILY
Qty: 90 TAB | Refills: 0 | Status: SHIPPED | OUTPATIENT
Start: 2018-08-13 | End: 2018-11-12 | Stop reason: SDUPTHER

## 2018-08-13 NOTE — PROGRESS NOTES
Psychiatric Outpatient Progress Note    Account Number:  [de-identified]  Name: Kayy Vaughan    SUBJECTIVE:   CHIEF COMPLAINT:  Kayy Vaughan is a 35 y.o. male and was seen today for follow-up of psychiatric condition and psychotropic medication management. HPI:    Ismael Solomon reports the following psychiatric symptoms:  anxiety and adjustment disorder with anxiety,. Medical h/o HT possibly dur to stress. . This is his first interaction with behavioral health. Client reported worries a lot all having a baby. Client reported that he has a stressful job and has high expectation with himself. Client reported has a current demanding job and is challenging job. Client is happily  and trying to have baby and client is very anxious about it and had occasional performance anxiety. Client had anxiety and had benefits with citalopram . His wife is pregnant. The symptoms have been present for few months and are of mild to moderate severity now. The symptoms occur infrequently. . Pt reported sleeping for 7-8 hrs and sleeping fitful. Reported feels stressors about new responsibility of a child. Reported has interest, has fair appetite, has energy, motivation and focus and concentrate. Denied hopelessness or helpelesness or passive suicide thoughts. Is happy in marriage and does activities together with his spouse. Denied any symptoms os psychosis or vangie. Additional symptomatology include anxiety. The above symptoms have been present for a few months since they are planning to have a baby. The patient reports onset of symptoms since September. Reported has anxiety since young age.      Contributing factors include: Wife is pregnant, stressful job. Patient denies SI/HI/SIB.      Side Effects:  none      Fam/Soc Hx (from [place] with updates):    Family History   Problem Relation Age of Onset    Hypertension Mother       Social History   Substance Use Topics    Smoking status: Never Smoker    Smokeless tobacco: Never Used    Alcohol use Yes      Comment: average 1 beer/day or less       REVIEW OF SYSTEMS:  Psychiatric:  anxiety. Appetite:good   Sleep: fitful                    Mental Status exam: WNL except for      Sensorium  oriented to time, place and person   Relations cooperative    Eye Contact    appropriate   Appearance:  age appropriate, casually dressed and well dressed   Motor Behavior/Gait:  gait stable and within normal limits   Speech:  normal pitch and normal volume   Thought Process: goal directed, logical and within normal limits   Thought Content free of delusions and free of hallucinations   Suicidal ideations no plan , no intention and none   Homicidal ideations no plan , no intention and none   Mood:  euthymic   Affect:  euthymic and mood-congruent   Memory recent  adequate   Memory remote:  adequate   Concentration:  adequate   Abstraction:  abstract   Insight:  fair   Reliability fair   Judgment:  fair       MEDICAL DECISION MAKING  Data: pertinent labs, imaging, medical records and diagnostic tests reviewed and incorporated in diagnosis and treatment plan    No Known Allergies     Current Outpatient Prescriptions   Medication Sig Dispense Refill    citalopram (CELEXA) 40 mg tablet Take 1 Tab by mouth daily. Indications: Generalized Anxiety Disorder 90 Tab 0    ketoconazole (NIZORAL) 2 % shampoo WASH SCALP/ FACE DAILY  99    ELIDEL 1 % topical cream APPLY TO AFFECTED AREA(S) OF GROIN/BUTTOCKS AS DIRECTED, TWICE A DAY  99    selenium sulfide 2.5 % lotion USE TO WASH SCALP/FACE DAILY  99    triamcinolone acetonide (KENALOG) 0.025 % ointment APPLY TO AFFECTED AREA ON GROIN/BUTTOCKS TWICE DAILY  1    nystatin-triamcinolone (MYCOLOG) 100,000-0.1 unit/gram-% ointment Apply  to affected area two (2) times a day. 30 g 0    sildenafil citrate (VIAGRA) 100 mg tablet Take 1 Tab by mouth as needed. 6 Tab 0    sildenafil citrate (VIAGRA) 25 mg tablet Take 1 Tab by mouth as needed.  6 Tab 0        Visit Vitals  Resp 19    Ht 6' 1\" (1.854 m)    Wt 83.4 kg (183 lb 12.8 oz)    BMI 24.25 kg/m2         Problems addressed today:  Adjustment disorder with anxiety, generalized anxiety disorder, performance anxiety. Assessment:  reviewed: appropriate use. Omega Douglas  is a 35 y.o.  male is partially responding to treatment. Symptoms are occurring infrequently. Today he reported that he is doing \"good\". Reporetd he feels that he does not need therapy. His wife is 31 weeks pregnant . Reported he is excited and feels ready. Reported his anxiety is under control. Denied any panic attacks. Enjoyed vacation. Reported taking mediation at bed time and is benefiting well. Client denied any  anxiety at work. Reported sleeping fitful, appetite is good, denied any racing thoughts. Denied any symptoms of depression. Has energy, interest, motivation and able to focus and concentrate. Client has not taken alprazolam. He is supportive to his wife. Plan to continue citalopram to target anxiety. Continue psychotherapy with [employer] and [employer]. Reviewed labs and records. Patient denies SI/HI/SIB. No evidence of AH/VH or delusions. Understands the importance of psychotherapy in his treatment plan. Psychoeducation, medication teaching, co-morbid illness and pertinent health factors to manage care were discussed. Overall, patient is stable at this time but will require ongoing medication management. Possible organic causes contributing are:? HT    Risk Scoring- chronic illnesses and prescription drug management    Treatment Plan:  1.   Medications:          Medication Changes/Adjustments: Continue alprazolam 0.25 mg prn daily HS-for panic attacks- none given- has 30 tabs                                                               Continue citalopram 40 mg daily                                                                                                                                Current Outpatient Prescriptions Medication Sig Dispense Refill    citalopram (CELEXA) 40 mg tablet Take 1 Tab by mouth daily. Indications: Generalized Anxiety Disorder 90 Tab 0    ketoconazole (NIZORAL) 2 % shampoo WASH SCALP/ FACE DAILY  99    ELIDEL 1 % topical cream APPLY TO AFFECTED AREA(S) OF GROIN/BUTTOCKS AS DIRECTED, TWICE A DAY  99    selenium sulfide 2.5 % lotion USE TO WASH SCALP/FACE DAILY  99    triamcinolone acetonide (KENALOG) 0.025 % ointment APPLY TO AFFECTED AREA ON GROIN/BUTTOCKS TWICE DAILY  1    nystatin-triamcinolone (MYCOLOG) 100,000-0.1 unit/gram-% ointment Apply  to affected area two (2) times a day. 30 g 0    sildenafil citrate (VIAGRA) 100 mg tablet Take 1 Tab by mouth as needed. 6 Tab 0    sildenafil citrate (VIAGRA) 25 mg tablet Take 1 Tab by mouth as needed. 6 Tab 0                  The following regarding medications was addressed:    (The risks and benefits of the proposed medication; the potential medication side effects ie    dry mouth, weight gain,nausea, GI upset, headache; patient given opportunity to ask questions)       2. Counseling and coordination of care including instructions for treatment, risks/benefits, risk factor reduction and patient/family education. He agrees with the plan. Patient instructed to call with any side effects, questions or issues. Instructed patient to call the clinic, and if after hours call the provider on call ifclient experiences any suicidal thought or ideas to hurt self or other. Also instructed to call 911 or go to the ED. Patient verbalized understanding and agreed to call    3. Follow-up Disposition:  Return in about 3 months (around 11/13/2018) for med check and follow up. 4. Other: Nutritional/health counseling on diet and exercise. For reliable dietary information, go to www. [employer].org.     PSYCHOTHERAPY:  approx 5-7 minutes  Type:  Supportive/Cognitive Behavioral psychotherapy provided  Focus:     Current problems- stressors related to wife's pregnany- first regnancy. discussed relaxation techniques. Medical issues- HT   Psychoeducation provided  Treatment plan reviewed with patient-including diagnosis and medications    [facility] is progressing.     Ebenezer Stout NP  8/13/2018

## 2018-08-13 NOTE — PATIENT INSTRUCTIONS
Sleep Tips    What to avoid    · Do not have drinks with caffeine, such as coffee or black tea, for 8 hours before bed. · Do not smoke or use other types of tobacco near bedtime. Nicotine is a stimulant and can keep you awake. · Avoid drinking alcohol late in the evening, because it can cause you to wake in the middle of the night. · Do not eat a big meal close to bedtime. If you are hungry, eat a light snack. · Do not drink a lot of water close to bedtime, because the need to urinate may wake you up during the night. · Do not read or watch TV in bed. Use the bed only for sleeping and sexual activity. What to try    · Go to bed at the same time every night, and wake up at the same time every morning. Do not take naps during the day. · Keep your bedroom quiet, dark, and cool. · Get regular exercise, but not within 3 to 4 hours of your bedtime. · Sleep on a comfortable pillow and mattress. · If watching the clock makes you anxious, turn it facing away from you so you cannot see the time. · If you worry when you lie down, start a worry book. Well before bedtime, write down your worries, and then set the book and your concerns aside. · Try meditation or other relaxation techniques before you go to bed. · If you cannot fall asleep, get up and go to another room until you feel sleepy. Do something relaxing. Repeat your bedtime routine before you go to bed again. Make your house quiet and calm about an hour before bedtime. Turn down the lights, turn off the TV, log off the computer, and turn down the volume on music. This can help you relax after a busy day. Recovering From Depression: Care Instructions  Your Care Instructions    Taking good care of yourself is important as you recover from depression. In time, your symptoms will fade as your treatment takes hold. Do not give up. Instead, focus your energy on getting better. Your mood will improve. It just takes some time.  Focus on things that can help you feel better, such as being with friends and family, eating well, and getting enough rest. But take things slowly. Do not do too much too soon. You will begin to feel better gradually. Follow-up care is a key part of your treatment and safety. Be sure to make and go to all appointments, and call your doctor if you are having problems. It's also a good idea to know your test results and keep a list of the medicines you take. How can you care for yourself at home? Be realistic  If you have a large task to do, break it up into smaller steps you can handle, and just do what you can. You may want to put off important decisions until your depression has lifted. If you have plans that will have a major impact on your life, such as marriage, divorce, or a job change, try to wait a bit. Talk it over with friends and loved ones who can help you look at the overall picture first.  Reaching out to people for help is important. Do not isolate yourself. Let your family and friends help you. Find someone you can trust and confide in, and talk to that person. Be patient, and be kind to yourself. Remember that depression is not your fault and is not something you can overcome with willpower alone. Treatment is necessary for depression, just like for any other illness. Feeling better takes time, and your mood will improve little by little. Stay active  Stay busy and get outside. Take a walk, or try some other light exercise. Talk with your doctor about an exercise program. Exercise can help with mild depression. Go to a movie or concert. Take part in a [employer] activity or other social gathering. Go to a ball game. Ask a friend to have dinner with you. Take care of yourself  Eat a balanced diet with plenty of fresh fruits and vegetables, whole grains, and lean protein. If you have lost your appetite, eat small snacks rather than large meals. Avoid drinking alcohol or using illegal drugs.  Do not take medicines that have not been prescribed for you. They may interfere with medicines you may be taking for depression, or they may make your depression worse. Take your medicines exactly as they are prescribed. You may start to feel better within 1 to 3 weeks of taking antidepressant medicine. But it can take as many as 6 to 8 weeks to see more improvement. If you have questions or concerns about your medicines, or if you do not notice any improvement by 3 weeks, talk to your doctor. If you have any side effects from your medicine, tell your doctor. Antidepressants can make you feel tired, dizzy, or nervous. Some people have dry mouth, constipation, headaches, sexual problems, or diarrhea. Many of these side effects are mild and will go away on their own after you have been taking the medicine for a few weeks. Some may last longer. Talk to your doctor if side effects are bothering you too much. You might be able to try a different medicine. Get enough sleep. If you have problems sleeping:  Go to bed at the same time every night, and get up at the same time every morning. Keep your bedroom dark and quiet. Do not exercise after 5:00 p.m. Avoid drinks with caffeine after 5:00 p.m. Avoid sleeping pills unless they are prescribed by the doctor treating your depression. Sleeping pills may make you groggy during the day, and they may interact with other medicine you are taking. If you have any other illnesses, such as diabetes, heart disease, or high blood pressure, make sure to continue with your treatment. Tell your doctor about all of the medicines you take, including those with or without a prescription. Keep the numbers for these national suicide hotlines: 8-248-377-TALK (7-796.510.1407) and 4-127-ELAAOKP (9-705.566.1783). If you or someone you know talks about suicide or feeling hopeless, get help right away. When should you call for help? Call 911 anytime you think you may need emergency care.  For example, call if:    You feel like hurting yourself or someone else.     Someone you know has depression and is about to attempt or is attempting suicide.    Call your doctor now or seek immediate medical care if:    You hear voices.     Someone you know has depression and:  Starts to give away his or her possessions. Uses illegal drugs or drinks alcohol heavily. Talks or writes about death, including writing suicide notes or talking about guns, knives, or pills. Starts to spend a lot of time alone. Acts very aggressively or suddenly appears calm.    Watch closely for changes in your health, and be sure to contact your doctor if:    You do not get better as expected. Where can you learn more? Go to http://shadi-louie.info/. Enter G360 in the search box to learn more about \"Recovering From Depression: Care Instructions. \"  Current as of: December 7, 2017  Content Version: 11.7  © 1999-2924 [employer]. Care instructions adapted under license by [employer] (which disclaims liability or warranty for this information). If you have questions about a medical condition or this instruction, always ask your healthcare professional. [address] any warranty or liability for your use of this information.   ·

## 2018-09-14 ENCOUNTER — OFFICE VISIT (OUTPATIENT)
Dept: INTERNAL MEDICINE CLINIC | Age: 33
End: 2018-09-14

## 2018-09-14 VITALS
HEART RATE: 65 BPM | WEIGHT: 188.4 LBS | RESPIRATION RATE: 16 BRPM | HEIGHT: 73 IN | DIASTOLIC BLOOD PRESSURE: 106 MMHG | TEMPERATURE: 97.7 F | BODY MASS INDEX: 24.97 KG/M2 | SYSTOLIC BLOOD PRESSURE: 163 MMHG | OXYGEN SATURATION: 98 %

## 2018-09-14 DIAGNOSIS — R03.0 ELEVATED BP WITHOUT DIAGNOSIS OF HYPERTENSION: Primary | ICD-10-CM

## 2018-09-14 NOTE — PROGRESS NOTES
Chief Complaint   Patient presents with    Blood Pressure Check     Pt who presents for follow up of a pre-existing problem of hypertension. Diet and Lifestyle: generally follows a low fat low cholesterol diet, generally follows a low sodium diet, exercises regularly, nonsmoker  Home BP Monitoring: is well controlled at home, ranging 125's/80's  Use of agents associated with hypertension: none. Cardiovascular ROS: no TIA's, no chest pain on exertion, no dyspnea on exertion, no swelling of ankles. New concerns: .     Reviewed and agree with Nurse Note and duplicated in this note. Reviewed PmHx, RxHx, FmHx, SocHx, AllgHx and updated and dated in the chart. Family History   Problem Relation Age of Onset    Hypertension Mother      History reviewed. No pertinent past medical history.    Social History     Social History    Marital status:      Spouse name: N/A    Number of children: N/A    Years of education: N/A     Social History Main Topics    Smoking status: Never Smoker    Smokeless tobacco: Never Used    Alcohol use Yes      Comment: average 1 beer/day or less    Drug use: No    Sexual activity: Yes     Other Topics Concern    None     Social History Narrative        Review of Systems - negative except as listed above      Objective:     Vitals:    09/14/18 1509   BP: (!) 163/106   Pulse: 65   Resp: 16   Temp: 97.7 °F (36.5 °C)   TempSrc: Oral   SpO2: 98%   Weight: 188 lb 6.4 oz (85.5 kg)   Height: 6' 1\" (1.854 m)       Physical Examination: General appearance - alert, well appearing, and in no distress  Eyes - pupils equal and reactive, extraocular eye movements intact  Ears - bilateral TM's and external ear canals normal  Nose - normal and patent, no erythema, discharge or polyps  Mouth - mucous membranes moist, pharynx normal without lesions  Neck - supple, no significant adenopathy  Chest - clear to auscultation, no wheezes, rales or rhonchi, symmetric air entry  Heart - normal rate, regular rhythm, normal S1, S2, no murmurs, rubs, clicks or gallops  Abdomen - soft, nontender, nondistended, no masses or organomegaly  Extremities - peripheral pulses normal, no pedal edema, no clubbing or cyanosis  Skin - normal coloration and turgor, no rashes, no suspicious skin lesions noted    Assessment/ Plan:   Diagnoses and all orders for this visit:    1. Elevated BP without diagnosis of hypertension  Patient will take a blood pressure log and bring it with him  Patient also has a nurse visit for blood pressure check with his home blood pressure medication to cross check numbers  Follow-up Disposition: Not on File  Patient was informed/counseled to:    1) Remember to stay active and/or exercise regularly (I suggest 30-45 minutes daily)   2) For reliable dietary information, go to www. EATRIGHT.org. You may wish to consider seeing the nutritionist at [facility] [phone], also consider the [address] 3) I routinely suggest a complete physical exam once each year (your birth month)  I have discussed the diagnosis with the patient and the intended plan as seen in the above orders. The patient has received an after-visit summary and questions were answered concerning future plans. Medication Side Effects and Warnings were discussed with patient: yes  Patient Labs were reviewed and or requested: yes  Patient Past Records were reviewed and or requested  yes  I have discussed the diagnosis with the patient and the intended plan as seen in the above orders. The patient has received an after-visit summary and questions were answered concerning future plans. Pt agrees to call or return to clinic and/or go to closest ER with any worsening of symptoms. This may include, but not limited to increased fever (>100.4) with NSAIDS or Tylenol, increased edema, confusion, rash, worsening of presenting symptoms.

## 2018-09-14 NOTE — MR AVS SNAPSHOT
[address] [facility] 
 
 
 [address] [id number] 
[phone] Patient: Omega Douglas MRN: [id number] :1985 Visit Information Date & Time Provider Department Dept. Phone Encounter #  
 2018  3:15 PM Tawny Chadwick  [facility] [address] [place] [address] [phone] [id number] Your Appointments 2018 11:00 AM  
Nurse Visit with Mike Aguirre [facility] ([facility]) Appt Note: BP check and flu shot  
 [address] [facility]  
  
   
 [address] [id number]  
  
    
 2018  9:00 AM  
ESTABLISHED PATIENT with Ebenezer Stout [facility] ([facility]) Appt Note: follow up [address] [facility] [address] [place] [address] [address]  
  
   
 [address] [facility] [address] [place] [phone] Upcoming Health Maintenance Date Due Influenza Age 5 to Adult 2018 DTaP/Tdap/Td series (2 - Td) 2028 Allergies as of 2018  Review Complete On: 2018 By: Tawny Chadwick MD  
 No Known Allergies Current Immunizations  Never Reviewed Name Date Influenza Vaccine (Quad) PF 10/13/2017, 2016 Tdap 2018 Not reviewed this visit Vitals BP Pulse Temp Resp Height(growth percentile) Weight(growth percentile) (!) 163/106 (BP 1 Location: Right arm, BP Patient Position: Sitting) 65 97.7 °F (36.5 °C) (Oral) 16 6' 1\" (1.854 m) 188 lb 6.4 oz (85.5 kg) SpO2 BMI Smoking Status 98% 24.86 kg/m2 Never Smoker Vitals History BMI and BSA Data Body Mass Index Body Surface Area  
 24.86 kg/m 2 2.1 m 2 Preferred Pharmacy Pharmacy Name Phone [id number] [phone] Your Updated Medication List  
  
   
This list is accurate as of 18  3:30 PM.  Always use your most recent med list.  
  
  
  
  
 citalopram 40 mg tablet Commonly known as:  Catherine Maldonado Take 1 Tab by mouth daily. Indications: Generalized Anxiety Disorder ELIDEL 1 % topical cream  
Generic drug:  pimecrolimus APPLY TO AFFECTED AREA(S) OF GROIN/BUTTOCKS AS DIRECTED, TWICE A DAY  
  
 ketoconazole 2 % shampoo Commonly known as:  NIZORAL  
WASH SCALP/ FACE DAILY  
  
 selenium sulfide 2.5 % lotion USE TO [facility] SCALP/FACE DAILY  
  
 triamcinolone acetonide 0.025 % ointment Commonly known as:  KENALOG  
APPLY TO AFFECTED AREA ON GROIN/BUTTOCKS TWICE DAILY Introducing [facility]! Dear iTffany Jefferson: Thank you for requesting a StartMe account. Our records indicate that you already have an active StartMe account. You can access your account anytime at [email or web address] Did you know that you can access your hospital and ER discharge instructions at any time in StartMe? You can also review all of your test results from your hospital stay or ER visit. Additional Information If you have questions, please visit the Frequently Asked Questions section of the StartMe website at [email or web address] Remember, StartMe is NOT to be used for urgent needs. For medical emergencies, dial 911. Now available from your iPhone and Android! Please provide this summary of care documentation to your next provider. Your primary care clinician is listed as Vikram Jamil. If you have any questions after today's visit, please call [phone].

## 2018-11-12 ENCOUNTER — OFFICE VISIT (OUTPATIENT)
Dept: BEHAVIORAL/MENTAL HEALTH CLINIC | Age: 33
End: 2018-11-12

## 2018-11-12 VITALS
SYSTOLIC BLOOD PRESSURE: 152 MMHG | DIASTOLIC BLOOD PRESSURE: 109 MMHG | BODY MASS INDEX: 25.13 KG/M2 | WEIGHT: 189.6 LBS | HEART RATE: 73 BPM | HEIGHT: 73 IN

## 2018-11-12 DIAGNOSIS — F41.1 GAD (GENERALIZED ANXIETY DISORDER): Primary | ICD-10-CM

## 2018-11-12 DIAGNOSIS — F41.0 PANIC ATTACKS: ICD-10-CM

## 2018-11-12 RX ORDER — CITALOPRAM 40 MG/1
40 TABLET, FILM COATED ORAL DAILY
Qty: 90 TAB | Refills: 0 | Status: SHIPPED | OUTPATIENT
Start: 2018-11-12 | End: 2019-02-18

## 2018-11-12 NOTE — PATIENT INSTRUCTIONS
Sleep Tips    What to avoid    · Do not have drinks with caffeine, such as coffee or black tea, for 8 hours before bed. · Do not smoke or use other types of tobacco near bedtime. Nicotine is a stimulant and can keep you awake. · Avoid drinking alcohol late in the evening, because it can cause you to wake in the middle of the night. · Do not eat a big meal close to bedtime. If you are hungry, eat a light snack. · Do not drink a lot of water close to bedtime, because the need to urinate may wake you up during the night. · Do not read or watch TV in bed. Use the bed only for sleeping and sexual activity. What to try    · Go to bed at the same time every night, and wake up at the same time every morning. Do not take naps during the day. · Keep your bedroom quiet, dark, and cool. · Get regular exercise, but not within 3 to 4 hours of your bedtime. · Sleep on a comfortable pillow and mattress. · If watching the clock makes you anxious, turn it facing away from you so you cannot see the time. · If you worry when you lie down, start a worry book. Well before bedtime, write down your worries, and then set the book and your concerns aside. · Try meditation or other relaxation techniques before you go to bed. · If you cannot fall asleep, get up and go to another room until you feel sleepy. Do something relaxing. Repeat your bedtime routine before you go to bed again. Make your house quiet and calm about an hour before bedtime. Turn down the lights, turn off the TV, log off the computer, and turn down the volume on music. This can help you relax after a busy day. Learning About Generalized Anxiety Disorder  What is generalized anxiety disorder? We all worry. It's a normal part of life. But when you have generalized anxiety disorder, you worry about lots of things and have a hard time stopping your worry. This worry or anxiety interferes with your relationships, work, and life. What causes it?   The cause is not known. But it may be passed down through families. What are the symptoms? You may feel anxious or worry most days about things like work, relationships, health, or money. You may worry about things that are unlikely to happen. You find it hard to stop or control the worry. Because you worry a lot and try hard to stop worrying, you may feel restless, tired, tense, or cranky. You may also find it hard to think or sleep. And you may have headaches or an upset stomach. How is it diagnosed? Your doctor will ask about your health and how often you worry or feel anxious. He or she may ask about other symptoms, like whether you:  Feel restless. Feel tired. Have a hard time thinking or feel that your mind goes blank. Feel cranky. Have tense muscles. Have sleep problems. A physical exam and tests can help make sure that your symptoms aren't caused by a different condition, such as a thyroid problem. How is it treated? Counseling and medicine can both work to treat anxiety. The two are often used along with lifestyle changes. Cognitive-behavioral therapy (CBT) is a type of counseling that's used to help treat anxiety. In CBT, you learn how to notice and replace thoughts that make you feel worried. It also can help you learn how to relax when you worry. Medicines can help. These medicines are often also used for depression. Selective serotonin reuptake inhibitors (SSRIs) are often tried first. But there are other medicines that your doctor may use. You may need to try a few medicines to find one that works well. Many people feel better by getting regular exercise, eating healthy meals, and getting good sleep. Mindfulness--focusing on things that happen in the present moment--also can help reduce your anxiety. What can you expect when you have it? Having anxiety can be upsetting. Some people might feel less worried and stressed after a couple of months of treatment.  But for other people, it might take longer to feel better. Reaching out to people for help is important. Try not to isolate yourself. Let your family and friends help you. Find someone you can trust and confide in. Talk to that person. When you know what anxiety is--and how you can get help for it--you can start to learn new ways of thinking. This can help you cope and work through your anxiety. Follow-up care is a key part of your treatment and safety. Be sure to make and go to all appointments, and call your doctor if you are having problems. It's also a good idea to know your test results and keep a list of the medicines you take. Where can you learn more? Go to http://shadi-louie.info/. Enter G110 in the search box to learn more about \"Learning About Generalized Anxiety Disorder. \"  Current as of: April 14, 2018  Content Version: 11.8  © 4847-0927 [employer]. Care instructions adapted under license by [employer] (which disclaims liability or warranty for this information). If you have questions about a medical condition or this instruction, always ask your healthcare professional. [place] [address] any warranty or liability for your use of this information.     ·

## 2018-11-12 NOTE — PROGRESS NOTES
Psychiatric Outpatient Progress Note    Account Number:  [de-identified]  Name: Kaycee Luz    SUBJECTIVE:   CHIEF COMPLAINT:  Kaycee Luz is a 35 y.o. male and was seen today for follow-up of psychiatric condition and psychotropic medication management. HPI:    Caitlyn Perea reports the following psychiatric symptoms:  anxiety and adjustment disorder with anxiety,. Medical h/o HT possibly dur to stress. . This is his first interaction with behavioral health. Client reported worries a lot all having a baby. Client reported that he has a stressful job and has high expectation with himself. Client reported has a current demanding job and is challenging job. Client is happily  and trying to have baby and client is very anxious about it and had occasional performance anxiety. Client had anxiety and had benefits with citalopram . His wife is pregnant. The symptoms have been present for few months and are of mild to moderate severity now. The symptoms occur infrequently. Pt reported sleeping for 7-8 hrs and sleeping fitful. Reported has interest, has fair appetite, has energy, motivation and focus and concentrate. Denied hopelessness or helpelesness or passive suicide thoughts. Is happy in marriage and does activities together with his spouse and baby. Denied any symptoms os psychosis or vangie. Additional symptomatology include anxiety. The above symptoms have been present for a few months since they are planning to have a baby. The patient reports onset of symptoms since September. Reported has anxiety since young age.      Contributing factors include:  Has a baby, stressful job. Patient denies SI/HI/SIB.      Side Effects:  none      Fam/Soc Hx (from [place] with updates):    Family History   Problem Relation Age of Onset    Hypertension Mother       Social History     Tobacco Use    Smoking status: Never Smoker    Smokeless tobacco: Never Used   Substance Use Topics    Alcohol use: Yes     Comment: average 1 beer/day or less    Drug use: No       REVIEW OF SYSTEMS:  Psychiatric:  anxiety. Appetite:good   Sleep: fitful                    Mental Status exam: WNL except for      Sensorium  oriented to time, place and person   Relations cooperative    Eye Contact    appropriate   Appearance:  age appropriate, casually dressed and well dressed   Motor Behavior/Gait:  gait stable and within normal limits   Speech:  normal pitch and normal volume   Thought Process: goal directed, logical and within normal limits   Thought Content free of delusions and free of hallucinations   Suicidal ideations no plan , no intention and none   Homicidal ideations no plan , no intention and none   Mood:  euthymic   Affect:  euthymic and mood-congruent   Memory recent  adequate   Memory remote:  adequate   Concentration:  adequate   Abstraction:  abstract   Insight:  fair   Reliability fair   Judgment:  fair       MEDICAL DECISION MAKING  Data: pertinent labs, imaging, medical records and diagnostic tests reviewed and incorporated in diagnosis and treatment plan    No Known Allergies     Current Outpatient Medications   Medication Sig Dispense Refill    citalopram (CELEXA) 40 mg tablet Take 1 Tab by mouth daily. 90 Tab 0    ketoconazole (NIZORAL) 2 % shampoo WASH SCALP/ FACE DAILY  99    ELIDEL 1 % topical cream APPLY TO AFFECTED AREA(S) OF GROIN/BUTTOCKS AS DIRECTED, TWICE A DAY  99    selenium sulfide 2.5 % lotion USE TO WASH SCALP/FACE DAILY  99    triamcinolone acetonide (KENALOG) 0.025 % ointment APPLY TO AFFECTED AREA ON GROIN/BUTTOCKS TWICE DAILY  1        Visit Vitals  BP (!) 152/109   Pulse 73   Ht 6' 1\" (1.854 m)   Wt 86 kg (189 lb 9.6 oz)   BMI 25.01 kg/m²         Problems addressed today:  Adjustment disorder with anxiety, generalized anxiety disorder, performance anxiety. Assessment:   Bijal Dozier  is a 35 y.o.  male is responding to treatment. Symptoms are occurring infrequently.  Today he reported that he is doing well. His wife had delivered in October and he is able to enjoy his son. Reported he adjusting welltTo new baby. Reported his anxiety is under control. Denied any panic attacks. Reported taking mediation at bed time and is benefiting well. reported sleeping well. Reported anxiety in stable and has not taken alprazolam.  Reported he was anxious about pregnancy and is more stable now. Client denied any anxiety at work. Reported sleeping fitful, appetite is good, denied any racing thoughts. Denied any symptoms of depression. Has energy, interest, motivation and able to focus and concentrate. He is supportive to his wife. Plan to continue citalopram to target anxiety at this time. Discussed to decrease the dose of citalopram in next visit after evaluation. Reviewed labs and records. Patient denies SI/HI/SIB. No evidence of AH/VH or delusions. Understands the importance of psychotherapy in his treatment plan. Psychoeducation, medication teaching, co-morbid illness and pertinent health factors to manage care were discussed. Overall, patient is stable at this time but will require ongoing medication management. N.B: Client has normal BP at home and high BP in Dr's office /hospital? White coat syndrome. Possible organic causes contributing are:? HT    Risk Scoring- chronic illnesses and prescription drug management    Treatment Plan:  1. Medications:          Medication Changes/Adjustments: Continue alprazolam 0.25 mg prn daily HS-for panic attacks- none given- has 30 tabs                                                               Continue citalopram 40 mg daily                                                                                                                                Current Outpatient Medications   Medication Sig Dispense Refill    citalopram (CELEXA) 40 mg tablet Take 1 Tab by mouth daily.  90 Tab 0    ketoconazole (NIZORAL) 2 % shampoo WASH SCALP/ FACE DAILY  99    ELIDEL 1 % topical cream APPLY TO AFFECTED AREA(S) OF GROIN/BUTTOCKS AS DIRECTED, TWICE A DAY  99    selenium sulfide 2.5 % lotion USE TO WASH SCALP/FACE DAILY  99    triamcinolone acetonide (KENALOG) 0.025 % ointment APPLY TO AFFECTED AREA ON GROIN/BUTTOCKS TWICE DAILY  1                  The following regarding medications was addressed:    (The risks and benefits of the proposed medication; the potential medication side effects ie  dry mouth, weight gain,nausea, GI upset, headache; patient given opportunity to ask questions)       2. Counseling and coordination of care including instructions for treatment, risks/benefits, risk factor reduction and patient/family education. He agrees with the plan. Patient instructed to call with any side effects, questions or issues. Instructed patient to call the clinic, and if after hours call the provider on call ifclient experiences any suicidal thought or ideas to hurt self or other. Also instructed to call 911 or go to the ED. Patient verbalized understanding and agreed to call    3. Follow-up Disposition:  Return in about 3 months (around 2/12/2019) for med check and follow up. 4. Other: Nutritional/health counseling on diet and exercise. For reliable dietary information, go to www. EATRIGHT.org. PSYCHOTHERAPY:  approx 5-7 minutes  Type:  Supportive/Cognitive Behavioral psychotherapy provided  Focus:     Current problems- caring for baby     Psychoeducation provided  Treatment plan reviewed with patient-including diagnosis and medications    Nanci Rachel is progressing.     Jude Olivares NP  11/12/2018

## 2019-01-03 DIAGNOSIS — F41.9 ANXIETY: Primary | ICD-10-CM

## 2019-01-03 RX ORDER — ALPRAZOLAM 0.5 MG/1
0.25 TABLET ORAL
Qty: 10 TAB | Refills: 0 | Status: SHIPPED | OUTPATIENT
Start: 2019-01-03 | End: 2019-05-13 | Stop reason: SDUPTHER

## 2019-02-18 ENCOUNTER — OFFICE VISIT (OUTPATIENT)
Dept: BEHAVIORAL/MENTAL HEALTH CLINIC | Age: 34
End: 2019-02-18

## 2019-02-18 VITALS
HEIGHT: 73 IN | HEART RATE: 73 BPM | WEIGHT: 198 LBS | SYSTOLIC BLOOD PRESSURE: 141 MMHG | DIASTOLIC BLOOD PRESSURE: 106 MMHG | BODY MASS INDEX: 26.24 KG/M2

## 2019-02-18 DIAGNOSIS — F41.1 GAD (GENERALIZED ANXIETY DISORDER): ICD-10-CM

## 2019-02-18 DIAGNOSIS — F41.0 PANIC ATTACKS: ICD-10-CM

## 2019-02-18 RX ORDER — CITALOPRAM 20 MG/1
30 TABLET, FILM COATED ORAL DAILY
Qty: 135 TAB | Refills: 0 | Status: SHIPPED | OUTPATIENT
Start: 2019-02-18 | End: 2019-05-13 | Stop reason: SDUPTHER

## 2019-02-18 RX ORDER — PROMETHAZINE HYDROCHLORIDE AND DEXTROMETHORPHAN HYDROBROMIDE 6.25; 15 MG/5ML; MG/5ML
SYRUP ORAL
COMMUNITY
End: 2019-02-18

## 2019-02-18 RX ORDER — CLOBETASOL PROPIONATE 0.5 MG/G
AEROSOL, FOAM TOPICAL
Refills: 1 | COMMUNITY
Start: 2019-01-19

## 2019-02-18 NOTE — PATIENT INSTRUCTIONS
Sleep Tips    What to avoid    · Do not have drinks with caffeine, such as coffee or black tea, for 8 hours before bed. · Do not smoke or use other types of tobacco near bedtime. Nicotine is a stimulant and can keep you awake. · Avoid drinking alcohol late in the evening, because it can cause you to wake in the middle of the night. · Do not eat a big meal close to bedtime. If you are hungry, eat a light snack. · Do not drink a lot of water close to bedtime, because the need to urinate may wake you up during the night. · Do not read or watch TV in bed. Use the bed only for sleeping and sexual activity. What to try    · Go to bed at the same time every night, and wake up at the same time every morning. Do not take naps during the day. · Keep your bedroom quiet, dark, and cool. · Get regular exercise, but not within 3 to 4 hours of your bedtime. · Sleep on a comfortable pillow and mattress. · If watching the clock makes you anxious, turn it facing away from you so you cannot see the time. · If you worry when you lie down, start a worry book. Well before bedtime, write down your worries, and then set the book and your concerns aside. · Try meditation or other relaxation techniques before you go to bed. · If you cannot fall asleep, get up and go to another room until you feel sleepy. Do something relaxing. Repeat your bedtime routine before you go to bed again. Make your house quiet and calm about an hour before bedtime. Turn down the lights, turn off the TV, log off the computer, and turn down the volume on music. This can help you relax after a busy day. Anxiety Disorder: Care Instructions  Your Care Instructions    Anxiety is a normal reaction to stress. Difficult situations can cause you to have symptoms such as sweaty palms and a nervous feeling. In an anxiety disorder, the symptoms are far more severe.  Constant worry, muscle tension, trouble sleeping, nausea and diarrhea, and other symptoms can make normal daily activities difficult or impossible. These symptoms may occur for no reason, and they can affect your work, school, or social life. Medicines, counseling, and self-care can all help. Follow-up care is a key part of your treatment and safety. Be sure to make and go to all appointments, and call your doctor if you are having problems. It's also a good idea to know your test results and keep a list of the medicines you take. How can you care for yourself at home? Take medicines exactly as directed. Call your doctor if you think you are having a problem with your medicine. Go to your counseling sessions and follow-up appointments. Recognize and accept your anxiety. Then, when you are in a situation that makes you anxious, say to yourself, \"This is not an emergency. I feel uncomfortable, but I am not in danger. I can keep going even if I feel anxious. \"  Be kind to your body:  Relieve tension with exercise or a massage. Get enough rest.  Avoid alcohol, caffeine, nicotine, and illegal drugs. They can increase your anxiety level and cause sleep problems. Learn and do relaxation techniques. See below for more about these techniques. Engage your mind. Get out and do something you enjoy. Go to a [employer] movie, or take a walk or hike. Plan your day. Having too much or too little to do can make you anxious. Keep a record of your symptoms. Discuss your fears with a good friend or family member, or join a support group for people with similar problems. Talking to others sometimes relieves stress. Get involved in social groups, or volunteer to help others. Being alone sometimes makes things seem worse than they are. Get at least 30 minutes of exercise on most days of the week to relieve stress. Walking is a good choice. You also may want to do other activities, such as running, swimming, cycling, or playing tennis or team sports. Relaxation techniques  Do relaxation exercises 10 to 20 minutes a day.  You can play soothing, relaxing music while you do them, if you wish. Tell others in your house that you are going to do your relaxation exercises. Ask them not to disturb you. Find a comfortable place, away from all distractions and noise. Lie down on your back, or sit with your back straight. Focus on your breathing. Make it slow and steady. Breathe in through your nose. Breathe out through either your nose or mouth. Breathe deeply, filling up the area between your navel and your rib cage. Breathe so that your belly goes up and down. Do not hold your breath. Breathe like this for 5 to 10 minutes. Notice the feeling of calmness throughout your whole body. As you continue to breathe slowly and deeply, relax by doing the following for another 5 to 10 minutes:  Tighten and relax each muscle group in your body. You can begin at your toes and work your way up to your head. Imagine your muscle groups relaxing and becoming heavy. Empty your mind of all thoughts. Let yourself relax more and more deeply. Become aware of the state of calmness that surrounds you. When your relaxation time is over, you can bring yourself back to alertness by moving your fingers and toes and then your hands and feet and then stretching and moving your entire body. Sometimes people fall asleep during relaxation, but they usually wake up shortly afterward. Always give yourself time to return to full alertness before you drive a car or do anything that might cause an accident if you are not fully alert. Never play a relaxation tape while you drive a car. When should you call for help? Call 911 anytime you think you may need emergency care. For example, call if:    You feel you cannot stop from hurting yourself or someone else.   [place] the numbers for these national suicide hotlines: 1-794-722-TALK (3-351.274.8832) and 2-310-KPWBRTZ (4-872.591.7528).  If you or someone you know talks about suicide or feeling hopeless, get help right away.   Watch closely for changes in your health, and be sure to contact your doctor if:    You have anxiety or fear that affects your life.     You have symptoms of anxiety that are new or different from those you had before. Where can you learn more? Go to http://shadi-louie.info/. Enter P754 in the search box to learn more about \"Anxiety Disorder: Care Instructions. \"  Current as of: September 11, 2018  Content Version: 11.9  © 2106-3483 [employer]. Care instructions adapted under license by [employer] (which disclaims liability or warranty for this information). If you have questions about a medical condition or this instruction, always ask your healthcare professional. [place] [address] any warranty or liability for your use of this information.     ·

## 2019-02-18 NOTE — PROGRESS NOTES
Psychiatric Outpatient Progress Note    Account Number:  [de-identified]  Name: Yue Carl    SUBJECTIVE:   CHIEF COMPLAINT:  Yue Carl is a 35 y.o. male and was seen today for follow-up of psychiatric condition and psychotropic medication management. Last office visit was in Nov 2018. HPI:    Clive Baron reports the following psychiatric symptoms:  anxiety and adjustment disorder with anxiety,. Medical h/o HT possibly dur to stress. . This is his first interaction with behavioral health. Client reported worries a lot all having a baby. Client reported that he has a stressful job and has high expectation with himself. Client reported has a current demanding job and is challenging job. Client is happily  and trying to have baby and client is very anxious about it and had occasional performance anxiety. Client had anxiety and had benefits with citalopram . His wife is pregnant. The symptoms have been present for few months and are of mild to moderate severity now. The symptoms occur infrequently. Pt reported sleeping for 7-8 hrs and sleeping fitful. Reported has interest, has fair appetite, has energy, motivation and focus and concentrate. Denied hopelessness or helpelesness or passive suicide thoughts. Is happy in marriage and does activities together with his spouse and baby. Denied any symptoms os psychosis or vangie. Additional symptomatology include anxiety. The above symptoms have been present for a few months since they are planning to have a baby. The patient reports onset of symptoms since September. Reported has anxiety since young age.      Contributing factors include:  Has a baby, stressful job. Patient denies SI/HI/SIB.      Side Effects:  none      Fam/Soc Hx (from [place] with updates):    Family History   Problem Relation Age of Onset    Hypertension Mother       Social History     Tobacco Use    Smoking status: Never Smoker    Smokeless tobacco: Never Used   Substance Use Topics    Alcohol use: Yes     Comment: average 1 beer/day or less    Drug use: No       REVIEW OF SYSTEMS:  Psychiatric:  anxiety. Appetite:good   Sleep: fitful                    Mental Status exam: WNL except for      Sensorium  oriented to time, place and person   Relations cooperative    Eye Contact    appropriate   Appearance:  age appropriate, casually dressed and well dressed   Motor Behavior/Gait:  gait stable and within normal limits   Speech:  normal pitch and normal volume   Thought Process: goal directed, logical and within normal limits   Thought Content free of delusions and free of hallucinations   Suicidal ideations no plan , no intention and none   Homicidal ideations no plan , no intention and none   Mood:  euthymic   Affect:  euthymic and mood-congruent   Memory recent  adequate   Memory remote:  adequate   Concentration:  adequate   Abstraction:  abstract   Insight:  fair   Reliability fair   Judgment:  fair       MEDICAL DECISION MAKING  Data: pertinent labs, imaging, medical records and diagnostic tests reviewed and incorporated in diagnosis and treatment plan    No Known Allergies     Current Outpatient Medications   Medication Sig Dispense Refill    clobetasol (OLUX) 0.05 % topical foam APPLY TO AFFECTED AREA TWICE A DAY SCALP FOR FLARES  1    citalopram (CELEXA) 20 mg tablet Take 1.5 Tabs by mouth daily. 135 Tab 0    ketoconazole (NIZORAL) 2 % shampoo WASH SCALP/ FACE DAILY  99    ELIDEL 1 % topical cream APPLY TO AFFECTED AREA(S) OF GROIN/BUTTOCKS AS DIRECTED, TWICE A DAY  99    selenium sulfide 2.5 % lotion USE TO WASH SCALP/FACE DAILY  99    ALPRAZolam (XANAX) 0.5 mg tablet Take 0.5 Tabs by mouth daily as needed for Anxiety.  10 Tab 0    triamcinolone acetonide (KENALOG) 0.025 % ointment APPLY TO AFFECTED AREA ON GROIN/BUTTOCKS TWICE DAILY  1        Visit Vitals  BP (!) 141/106   Pulse 73   Ht 6' 1\" (1.854 m)   Wt 89.8 kg (198 lb)   BMI 26.12 kg/m²         Problems addressed today:  Adjustment disorder with anxiety, generalized anxiety disorder, performance anxiety. Assessment:   Gm Damian  is a 35 y.o.  male is responding to treatment. Symptoms are occurring infrequently. Today he reported that he is doing well. His wife had delivered in October and he is able to enjoy his son. Reported he adjusting well to new baby. Reported his anxiety is under control. Denied any panic attacks. Reported taking mediation at bed time and is benefiting well. reported sleeping well. Reported anxiety in stable and has not taken alprazolam since 1 year. .  Reported he is enjoying his son. Client denied any anxiety at work. Reported sleeping fitful, appetite is good, denied any racing thoughts. Denied any symptoms of depression. Has energy, interest, motivation and able to focus and concentrate. He is supportive to his wife. Plan to continue citalopram to target anxiety at this time. Plan to decrease the dose and requested by patient as discussed in previous visit. Reviewed labs and records. Patient denies SI/HI/SIB. No evidence of AH/VH or delusions. Understands the importance of psychotherapy in his treatment plan. Psychoeducation, medication teaching, co-morbid illness and pertinent health factors to manage care were discussed. Overall, patient is stable at this time but will require ongoing medication management. N.B: Client has normal BP at home and high BP in Dr's office /hospital? White coat syndrome. B.P is normal at home. Possible organic causes contributing are:none  Risk Scoring- chronic illnesses and prescription drug management    Treatment Plan:  1.   Medications:          Medication Changes/Adjustments: Continue alprazolam 0.25 mg prn daily HS-for panic attacks- none given- has 10 tabs                                                               decrease citalopram to 30 mg daily ordered CBC, BMP, TSH      Current Outpatient Medications   Medication Sig Dispense Refill    clobetasol (OLUX) 0.05 % topical foam APPLY TO AFFECTED AREA TWICE A DAY SCALP FOR FLARES  1    citalopram (CELEXA) 20 mg tablet Take 1.5 Tabs by mouth daily. 135 Tab 0    ketoconazole (NIZORAL) 2 % shampoo WASH SCALP/ FACE DAILY  99    ELIDEL 1 % topical cream APPLY TO AFFECTED AREA(S) OF GROIN/BUTTOCKS AS DIRECTED, TWICE A DAY  99    selenium sulfide 2.5 % lotion USE TO WASH SCALP/FACE DAILY  99    ALPRAZolam (XANAX) 0.5 mg tablet Take 0.5 Tabs by mouth daily as needed for Anxiety. 10 Tab 0    triamcinolone acetonide (KENALOG) 0.025 % ointment APPLY TO AFFECTED AREA ON GROIN/BUTTOCKS TWICE DAILY  1                  The following regarding medications was addressed:    (The risks and benefits of the proposed medication; the potential medication side effects ie  dry mouth, weight gain,nausea, GI upset, headache; patient given opportunity to ask questions)       2. Counseling and coordination of care including instructions for treatment, risks/benefits, risk factor reduction and patient/family education. He agrees with the plan. Patient instructed to call with any side effects, questions or issues. Instructed patient to call the clinic, and if after hours call the provider on call ifclient experiences any suicidal thought or ideas to hurt self or other. Also instructed to call 911 or go to the ED. Patient verbalized understanding and agreed to call    3. Follow-up Disposition:  Return in about 3 months (around 5/18/2019) for med check and follow up. 4. Other: Nutritional/health counseling on diet and exercise. For reliable dietary information, go to www. [email or web address].org.     PSYCHOTHERAPY:  approx 5-7 minutes  Type:  Supportive/Cognitive Behavioral psychotherapy provided  Focus:     Current problems- caring for baby     Psychoeducation provided  Treatment plan reviewed with patient-including diagnosis and medications    Kip Litex is progressing.     Shraddha Dinh NP  2/18/2019

## 2019-05-13 ENCOUNTER — OFFICE VISIT (OUTPATIENT)
Dept: BEHAVIORAL/MENTAL HEALTH CLINIC | Age: 34
End: 2019-05-13

## 2019-05-13 VITALS
BODY MASS INDEX: 26.16 KG/M2 | WEIGHT: 197.4 LBS | HEIGHT: 73 IN | SYSTOLIC BLOOD PRESSURE: 164 MMHG | HEART RATE: 101 BPM | DIASTOLIC BLOOD PRESSURE: 113 MMHG

## 2019-05-13 DIAGNOSIS — F41.1 GAD (GENERALIZED ANXIETY DISORDER): Primary | ICD-10-CM

## 2019-05-13 DIAGNOSIS — F41.0 PANIC ATTACKS: ICD-10-CM

## 2019-05-13 DIAGNOSIS — F41.9 ANXIETY: ICD-10-CM

## 2019-05-13 RX ORDER — ONDANSETRON 4 MG/1
TABLET, ORALLY DISINTEGRATING ORAL
COMMUNITY
End: 2019-09-13 | Stop reason: ALTCHOICE

## 2019-05-13 RX ORDER — ALPRAZOLAM 0.5 MG/1
0.25 TABLET ORAL
Qty: 10 TAB | Refills: 0 | Status: SHIPPED | OUTPATIENT
Start: 2019-05-13 | End: 2022-08-04 | Stop reason: ALTCHOICE

## 2019-05-13 RX ORDER — CITALOPRAM 20 MG/1
30 TABLET, FILM COATED ORAL DAILY
Qty: 135 TAB | Refills: 0 | Status: SHIPPED | OUTPATIENT
Start: 2019-05-13 | End: 2019-08-11 | Stop reason: SDUPTHER

## 2019-05-13 NOTE — PROGRESS NOTES
Psychiatric Outpatient Progress Note    Account Number:  [de-identified]  Name: Fabricio Gallegos    SUBJECTIVE:   CHIEF COMPLAINT:  Fabricio Gallegos is a 35 y.o. male and was seen today for follow-up of psychiatric condition and psychotropic medication management. Last office visit was in Feb 2018. HPI:    Tiffany Jefferson reports the following psychiatric symptoms:  anxiety and adjustment disorder with anxiety,. Medical h/o HT possibly dur to stress. . This is his first interaction with behavioral health. Client reported worries a lot all having a baby. Client reported that he has a stressful job and has high expectation with himself. Client reported has a current demanding job and is challenging job. Client is happily  and trying to have baby and client is very anxious about it and had occasional performance anxiety. Client had anxiety and had benefits with citalopram . His wife is pregnant. The symptoms have been present for few months and are of mild to moderate severity now. The symptoms occur infrequently. Pt reported sleeping for 7-8 hrs and sleeping fitful. Reported has interest, has fair appetite, has energy, motivation and focus and concentrate. Denied hopelessness or helpelesness or passive suicide thoughts. Is happy in marriage and does activities together with his spouse and baby. Denied any symptoms os psychosis or vangie. Additional symptomatology include anxiety. The above symptoms have been present for a few months since they are planning to have a baby. The patient reports onset of symptoms since September. Reported has anxiety since young age.      Contributing factors include:  Has a baby, stressful job. Patient denies SI/HI/SIB.      Side Effects:  none      Fam/Soc Hx (from [place] with updates):    Family History   Problem Relation Age of Onset    Hypertension Mother       Social History     Tobacco Use    Smoking status: Never Smoker    Smokeless tobacco: Never Used   Substance Use Topics    Alcohol use: Yes     Comment: average 1 beer/day or less    Drug use: No       REVIEW OF SYSTEMS:  Psychiatric:  anxiety. Appetite:good   Sleep: fitful                    Mental Status exam: WNL except for      Sensorium  oriented to time, place and person   Relations cooperative    Eye Contact    appropriate   Appearance:  age appropriate, casually dressed and well dressed   Motor Behavior/Gait:  gait stable and within normal limits   Speech:  normal pitch and normal volume   Thought Process: goal directed, logical and within normal limits   Thought Content free of delusions and free of hallucinations   Suicidal ideations no plan , no intention and none   Homicidal ideations no plan , no intention and none   Mood:  euthymic   Affect:  euthymic and mood-congruent   Memory recent  adequate   Memory remote:  adequate   Concentration:  adequate   Abstraction:  abstract   Insight:  fair   Reliability fair   Judgment:  fair       MEDICAL DECISION MAKING  Data: pertinent labs, imaging, medical records and diagnostic tests reviewed and incorporated in diagnosis and treatment plan    No Known Allergies     Current Outpatient Medications   Medication Sig Dispense Refill    citalopram (CELEXA) 20 mg tablet Take 1.5 Tabs by mouth daily. Indications: Repeated Episodes of Anxiety 135 Tab 0    ALPRAZolam (XANAX) 0.5 mg tablet Take 0.5 Tabs by mouth daily as needed for Anxiety.  10 Tab 0    clobetasol (OLUX) 0.05 % topical foam APPLY TO AFFECTED AREA TWICE A DAY SCALP FOR FLARES  1    ketoconazole (NIZORAL) 2 % shampoo WASH SCALP/ FACE DAILY  99    ELIDEL 1 % topical cream APPLY TO AFFECTED AREA(S) OF GROIN/BUTTOCKS AS DIRECTED, TWICE A DAY  99    selenium sulfide 2.5 % lotion USE TO WASH SCALP/FACE DAILY  99    triamcinolone acetonide (KENALOG) 0.025 % ointment APPLY TO AFFECTED AREA ON GROIN/BUTTOCKS TWICE DAILY  1    ondansetron (ZOFRAN ODT) 4 mg disintegrating tablet ondansetron 4 mg disintegrating tablet   Take 1 tablet every 8 hours by oral route as needed.  sodium chloride 0.9 % kit sodium chloride 0.9 % intravenous solution   IV in office - run over 35 mins          Visit Vitals  BP (!) 164/113   Pulse (!) 101   Ht 6' 1\" (1.854 m)   Wt 89.5 kg (197 lb 6.4 oz)   BMI 26.04 kg/m²         Problems addressed today:  Adjustment disorder with anxiety, generalized anxiety disorder, performance anxiety, white coat syndrome. Assessment:   Yesenia Mahmood  is a 35 y.o.  male is responding to treatment. Symptoms are occurring infrequently. Today he reported that he is doing well. His son is 6-8 months and is doing well and is enjoying. Reported has one anxiety attack in one year and took alprazolam and has benefits. Reported his anxiety is under control. Denied any panic attacks. Reported taking mediation at bed time and is benefiting well. Reported sleeping well. Reported anxiety in stable . Reported he is enjoying his son. Client denied any anxiety at work. Reported sleeping fitful, appetite is good, denied any racing thoughts. Denied any symptoms of depression. Has energy, interest, motivation and able to focus and concentrate. He is supportive to his wife. Has plans for beach vacation. client reported reading recently about sexual side effects of citalopram and felt arousal difficulty. Client would like to try to take medication in morning and see the effect. Discussed to begin duloxetine or venlafaxine or adjunct bupropion to target SE. At this time plan to continue citalopram to target anxiety at this time. Reviewed labs and records. Patient denies SI/HI/SIB. No evidence of AH/VH or delusions. Understands the importance of psychotherapy in his treatment plan. Psychoeducation, medication teaching, co-morbid illness and pertinent health factors to manage care were discussed. Overall, patient is stable at this time but will require ongoing medication management.    N.B: Client has normal BP at home and high BP in 's office /hospital? White coat syndrome. B.P is normal at home. Possible organic causes contributing are:none  Risk Scoring- chronic illnesses and prescription drug management    Treatment Plan:  1. Medications:          Medication Changes/Adjustments: Continue alprazolam 0.25 mg prn daily HS-for panic attacks- none given- has 10 tabs                                                              Continue citalopram to 30 mg daily                                                                                                                          Reprinted ordered CBC, BMP, TSH      Current Outpatient Medications   Medication Sig Dispense Refill    citalopram (CELEXA) 20 mg tablet Take 1.5 Tabs by mouth daily. Indications: Repeated Episodes of Anxiety 135 Tab 0    ALPRAZolam (XANAX) 0.5 mg tablet Take 0.5 Tabs by mouth daily as needed for Anxiety. 10 Tab 0    clobetasol (OLUX) 0.05 % topical foam APPLY TO AFFECTED AREA TWICE A DAY SCALP FOR FLARES  1    ketoconazole (NIZORAL) 2 % shampoo WASH SCALP/ FACE DAILY  99    ELIDEL 1 % topical cream APPLY TO AFFECTED AREA(S) OF GROIN/BUTTOCKS AS DIRECTED, TWICE A DAY  99    selenium sulfide 2.5 % lotion USE TO WASH SCALP/FACE DAILY  99    triamcinolone acetonide (KENALOG) 0.025 % ointment APPLY TO AFFECTED AREA ON GROIN/BUTTOCKS TWICE DAILY  1    ondansetron (ZOFRAN ODT) 4 mg disintegrating tablet ondansetron 4 mg disintegrating tablet   Take 1 tablet every 8 hours by oral route as needed.  sodium chloride 0.9 % kit sodium chloride 0.9 % intravenous solution   IV in office - run over 35 mins                    The following regarding medications was addressed:    (The risks and benefits of the proposed medication; the potential medication side effects ie  dry mouth, weight gain,nausea, GI upset, headache; patient given opportunity to ask questions)       2.   Counseling and coordination of care including instructions for treatment, risks/benefits, risk factor reduction and patient/family education. He agrees with the plan. Patient instructed to call with any side effects, questions or issues. Instructed patient to call the clinic, and if after hours call the provider on call ifclient experiences any suicidal thought or ideas to hurt self or other. Also instructed to call 911 or go to the ED. Patient verbalized understanding and agreed to call    3. Follow-up and Dispositions    · Return in about 3 months (around 8/13/2019) for med check and follow up. 4. Other: Nutritional/health counseling on diet and exercise. For reliable dietary information, go to www. [employer].org. PSYCHOTHERAPY:  approx 5-7 minutes  Type:  Supportive/Cognitive Behavioral psychotherapy provided  Focus:  Discussed on sleep hygiene and relaxation techniques   Current problems- worries about future at bed time     Psychoeducation provided  Treatment plan reviewed with patient-including diagnosis and medications    Criss Lester is progressing.     Nahum Kay NP  5/13/2019

## 2019-05-13 NOTE — PATIENT INSTRUCTIONS
Recovering From Depression: Care Instructions  Your Care Instructions    Taking good care of yourself is important as you recover from depression. In time, your symptoms will fade as your treatment takes hold. Do not give up. Instead, focus your energy on getting better. Your mood will improve. It just takes some time. Focus on things that can help you feel better, such as being with friends and family, eating well, and getting enough rest. But take things slowly. Do not do too much too soon. You will begin to feel better gradually. Follow-up care is a key part of your treatment and safety. Be sure to make and go to all appointments, and call your doctor if you are having problems. It's also a good idea to know your test results and keep a list of the medicines you take. How can you care for yourself at home? Be realistic  · If you have a large task to do, break it up into smaller steps you can handle, and just do what you can. · You may want to put off important decisions until your depression has lifted. If you have plans that will have a major impact on your life, such as marriage, divorce, or a job change, try to wait a bit. Talk it over with friends and loved ones who can help you look at the overall picture first.  · Reaching out to people for help is important. Do not isolate yourself. Let your family and friends help you. Find someone you can trust and confide in, and talk to that person. · Be patient, and be kind to yourself. Remember that depression is not your fault and is not something you can overcome with willpower alone. Treatment is necessary for depression, just like for any other illness. Feeling better takes time, and your mood will improve little by little. Stay active  · Stay busy and get outside. Take a walk, or try some other light exercise. · Talk with your doctor about an exercise program. Exercise can help with mild depression. · Go to a movie or concert.  Take part in a [employer] activity or other social gathering. Go to a [employer] game. · Ask a friend to have dinner with you. Take care of yourself  · Eat a balanced diet with plenty of fresh fruits and vegetables, whole grains, and lean protein. If you have lost your appetite, eat small snacks rather than large meals. · Avoid drinking alcohol or using illegal drugs. Do not take medicines that have not been prescribed for you. They may interfere with medicines you may be taking for depression, or they may make your depression worse. · Take your medicines exactly as they are prescribed. You may start to feel better within 1 to 3 weeks of taking antidepressant medicine. But it can take as many as 6 to 8 weeks to see more improvement. If you have questions or concerns about your medicines, or if you do not notice any improvement by 3 weeks, talk to your doctor. · If you have any side effects from your medicine, tell your doctor. Antidepressants can make you feel tired, dizzy, or nervous. Some people have dry mouth, constipation, headaches, sexual problems, or diarrhea. Many of these side effects are mild and will go away on their own after you have been taking the medicine for a few weeks. Some may last longer. Talk to your doctor if side effects are bothering you too much. You might be able to try a different medicine. · Get enough sleep. If you have problems sleeping:  ? Go to bed at the same time every night, and get up at the same time every morning. ? Keep your bedroom dark and quiet. ? Do not exercise after 5:00 p.m.  ? Avoid drinks with caffeine after 5:00 p.m. · Avoid sleeping pills unless they are prescribed by the doctor treating your depression. Sleeping pills may make you groggy during the day, and they may interact with other medicine you are taking. · If you have any other illnesses, such as diabetes, heart disease, or high blood pressure, make sure to continue with your treatment.  Tell your doctor about all of the medicines you take, including those with or without a prescription. · Keep the numbers for these national suicide hotlines: 6-304-669-TALK (2-812.578.9831) and 6-043-UNVBWER (3-630.391.2864). If you or someone you know talks about suicide or feeling hopeless, get help right away. When should you call for help? Call 911 anytime you think you may need emergency care. For example, call if:    · You feel like hurting yourself or someone else.     · Someone you know has depression and is about to attempt or is attempting suicide.   [facility] your doctor now or seek immediate medical care if:    · You hear voices.     · Someone you know has depression and:  ? Starts to give away his or her possessions. ? Uses illegal drugs or drinks alcohol heavily. ? Talks or writes about death, including writing suicide notes or talking about guns, knives, or pills. ? Starts to spend a lot of time alone. ? Acts very aggressively or suddenly appears calm.    Watch closely for changes in your health, and be sure to contact your doctor if:    · You do not get better as expected. Where can you learn more? Go to http://sahdi-louie.info/. Enter H747 in the search box to learn more about \"Recovering From Depression: Care Instructions. \"  Current as of: September 11, 2018  Content Version: 11.9  © 8168-6671 [facility]. Care instructions adapted under license by [employer] (which disclaims liability or warranty for this information). If you have questions about a medical condition or this instruction, always ask your healthcare professional. [place] [address] any warranty or liability for your use of this information. Sleep Tips    What to avoid    · Do not have drinks with caffeine, such as coffee or black tea, for 8 hours before bed. · Do not smoke or use other types of tobacco near bedtime. Nicotine is a stimulant and can keep you awake.   · Avoid drinking alcohol late in the evening, because it can cause you to wake in the middle of the night. · Do not eat a big meal close to bedtime. If you are hungry, eat a light snack. · Do not drink a lot of water close to bedtime, because the need to urinate may wake you up during the night. · Do not read or watch TV in bed. Use the bed only for sleeping and sexual activity. What to try    · Go to bed at the same time every night, and wake up at the same time every morning. Do not take naps during the day. · Keep your bedroom quiet, dark, and cool. · Get regular exercise, but not within 3 to 4 hours of your bedtime. · Sleep on a comfortable pillow and mattress. · If watching the clock makes you anxious, turn it facing away from you so you cannot see the time. · If you worry when you lie down, start a worry book. Well before bedtime, write down your worries, and then set the book and your concerns aside. · Try meditation or other relaxation techniques before you go to bed. · If you cannot fall asleep, get up and go to another room until you feel sleepy. Do something relaxing. Repeat your bedtime routine before you go to bed again. · Make your house quiet and calm about an hour before bedtime. Turn down the lights, turn off the TV, log off the computer, and turn down the volume on music. This can help you relax after a busy day.

## 2019-08-11 DIAGNOSIS — F41.1 GAD (GENERALIZED ANXIETY DISORDER): ICD-10-CM

## 2019-08-11 DIAGNOSIS — F41.0 PANIC ATTACKS: ICD-10-CM

## 2019-08-12 RX ORDER — CITALOPRAM 20 MG/1
30 TABLET, FILM COATED ORAL DAILY
Qty: 135 TAB | Refills: 0 | Status: SHIPPED | OUTPATIENT
Start: 2019-08-12 | End: 2019-08-23 | Stop reason: SDUPTHER

## 2019-08-23 ENCOUNTER — OFFICE VISIT (OUTPATIENT)
Dept: BEHAVIORAL/MENTAL HEALTH CLINIC | Age: 34
End: 2019-08-23

## 2019-08-23 VITALS
WEIGHT: 203.6 LBS | HEART RATE: 72 BPM | BODY MASS INDEX: 26.98 KG/M2 | HEIGHT: 73 IN | SYSTOLIC BLOOD PRESSURE: 142 MMHG | DIASTOLIC BLOOD PRESSURE: 111 MMHG

## 2019-08-23 DIAGNOSIS — F41.1 GAD (GENERALIZED ANXIETY DISORDER): ICD-10-CM

## 2019-08-23 DIAGNOSIS — R03.0 WHITE COAT SYNDROME WITH HIGH BLOOD PRESSURE BUT WITHOUT HYPERTENSION: ICD-10-CM

## 2019-08-23 DIAGNOSIS — F41.0 PANIC ATTACKS: ICD-10-CM

## 2019-08-23 DIAGNOSIS — F41.9 ANXIETY: Primary | ICD-10-CM

## 2019-08-23 RX ORDER — CITALOPRAM 20 MG/1
30 TABLET, FILM COATED ORAL DAILY
Qty: 135 TAB | Refills: 0 | Status: SHIPPED | OUTPATIENT
Start: 2019-08-23 | End: 2019-11-11 | Stop reason: SDUPTHER

## 2019-08-23 NOTE — PATIENT INSTRUCTIONS

## 2019-08-23 NOTE — PROGRESS NOTES
Psychiatric Outpatient Progress Note    Account Number:  [de-identified]  Name: Mikalea Rai    SUBJECTIVE:   CHIEF COMPLAINT:  Mikaela Rai is a 29 y.o. male and was seen today for follow-up of psychiatric condition and psychotropic medication management. Last office visit was in Feb 2018. HPI:    Vijya Prater reports the following psychiatric symptoms:  anxiety and adjustment disorder with anxiety,. Medical h/o HT possibly dur to stress. . This is his first interaction with behavioral health. Client reported worries a lot all having a baby. Client reported that he has a stressful job and has high expectation with himself. Client reported has a current demanding job and is challenging job. Client is happily  and trying to have baby and client is very anxious about it and had occasional performance anxiety. Client had anxiety and had benefits with citalopram . His wife is pregnant. The symptoms have been present for few months and are of mild to moderate severity now. The symptoms occur infrequently. Pt reported sleeping for 7-8 hrs and sleeping fitful. Reported has interest, has fair appetite, has energy, motivation and focus and concentrate. Denied hopelessness or helpelesness or passive suicide thoughts. Is happy in marriage and does activities together with his spouse and baby. Denied any symptoms os psychosis or vangie. Additional symptomatology include anxiety. The above symptoms have been present for a few months since they are planning to have a baby. The patient reports onset of symptoms since September. Reported has anxiety since young age.      Contributing factors include:  Has a baby, stressful job. Patient denies SI/HI/SIB.      Side Effects:  none      Fam/Soc Hx (from [place] with updates):    Family History   Problem Relation Age of Onset    Hypertension Mother       Social History     Tobacco Use    Smoking status: Never Smoker    Smokeless tobacco: Never Used   Substance Use Topics    Alcohol use: Yes     Comment: average 1 beer/day or less    Drug use: No       REVIEW OF SYSTEMS:  Psychiatric:  anxiety. Appetite:good   Sleep: fitful                    Mental Status exam: WNL except for      Sensorium  oriented to time, place and person   Relations cooperative    Eye Contact    appropriate   Appearance:  age appropriate, casually dressed and well dressed   Motor Behavior/Gait:  gait stable and within normal limits   Speech:  normal pitch and normal volume   Thought Process: goal directed, logical and within normal limits   Thought Content free of delusions and free of hallucinations   Suicidal ideations no plan , no intention and none   Homicidal ideations no plan , no intention and none   Mood:  euthymic   Affect:  euthymic and mood-congruent   Memory recent  adequate   Memory remote:  adequate   Concentration:  adequate   Abstraction:  abstract   Insight:  fair   Reliability fair   Judgment:  fair       MEDICAL DECISION MAKING  Data: pertinent labs, imaging, medical records and diagnostic tests reviewed and incorporated in diagnosis and treatment plan    No Known Allergies     Current Outpatient Medications   Medication Sig Dispense Refill    citalopram (CELEXA) 20 mg tablet Take 1.5 Tabs by mouth daily. Indications: Repeated Episodes of Anxiety 135 Tab 0    ketoconazole (NIZORAL) 2 % shampoo WASH SCALP/ FACE DAILY  99    selenium sulfide 2.5 % lotion USE TO WASH SCALP/FACE DAILY  99    ondansetron (ZOFRAN ODT) 4 mg disintegrating tablet ondansetron 4 mg disintegrating tablet   Take 1 tablet every 8 hours by oral route as needed.  sodium chloride 0.9 % kit sodium chloride 0.9 % intravenous solution   IV in office - run over 35 mins      ALPRAZolam (XANAX) 0.5 mg tablet Take 0.5 Tabs by mouth daily as needed for Anxiety.  10 Tab 0    clobetasol (OLUX) 0.05 % topical foam APPLY TO AFFECTED AREA TWICE A DAY SCALP FOR FLARES  1    ELIDEL 1 % topical cream APPLY TO AFFECTED AREA(S) OF GROIN/BUTTOCKS AS DIRECTED, TWICE A DAY  99    triamcinolone acetonide (KENALOG) 0.025 % ointment APPLY TO AFFECTED AREA ON GROIN/BUTTOCKS TWICE DAILY  1        Visit Vitals  BP (!) 142/111 (BP 1 Location: Left arm, BP Patient Position: Sitting)   Pulse 72   Ht 6' 1\" (1.854 m)   Wt 92.4 kg (203 lb 9.6 oz)   BMI 26.86 kg/m²         Problems addressed today:   anxiety disorder ,  generalized anxiety disorder, performance anxiety, white coat syndrome. Assessment:   Corrie Quintanilla  is a 29 y.o.  male is responding to treatment. Symptoms are occurring infrequently. Today he reported that he is doing well. His son is 10 months and is doing well and is enjoying. Reported has good vacation with family. Reported able to cope well at work. Reported his anxiety is under control. Denied any panic attacks. Reported taking medication at bed time and is benefiting well. reported sleeping fitful, appetite is good, denied any racing thoughts. Denied any symptoms of depression. Has energy, interest, motivation and able to focus and concentrate. He is supportive to his wife. Planning for son's birthday. Denied any sexual side effects now. At this time plan to continue citalopram to target anxiety at this time. Reviewed labs and records. Patient denies SI/HI/SIB. No evidence of AH/VH or delusions. Has completed labs at work and will fax results to this office. Understands the importance of psychotherapy in his treatment plan. Psychoeducation, medication teaching, co-morbid illness and pertinent health factors to manage care were discussed. Overall, patient is stable at this time but will require ongoing medication management. N.B: Client has normal BP at home and high BP in Dr's office /hospital? White coat syndrome. B.P is normal at home. Possible organic causes contributing are:none  Risk Scoring- chronic illnesses and prescription drug management    Treatment Plan:  1.   Medications:          Medication Changes/Adjustments: Continue alprazolam 0.25 mg prn daily HS-for panic attacks- none given- has 10 tabs                                                              Continue citalopram to 30 mg daily                                                                                                                               Current Outpatient Medications   Medication Sig Dispense Refill    citalopram (CELEXA) 20 mg tablet Take 1.5 Tabs by mouth daily. Indications: Repeated Episodes of Anxiety 135 Tab 0    ketoconazole (NIZORAL) 2 % shampoo WASH SCALP/ FACE DAILY  99    selenium sulfide 2.5 % lotion USE TO WASH SCALP/FACE DAILY  99    ondansetron (ZOFRAN ODT) 4 mg disintegrating tablet ondansetron 4 mg disintegrating tablet   Take 1 tablet every 8 hours by oral route as needed.  sodium chloride 0.9 % kit sodium chloride 0.9 % intravenous solution   IV in office - run over 35 mins      ALPRAZolam (XANAX) 0.5 mg tablet Take 0.5 Tabs by mouth daily as needed for Anxiety. 10 Tab 0    clobetasol (OLUX) 0.05 % topical foam APPLY TO AFFECTED AREA TWICE A DAY SCALP FOR FLARES  1    ELIDEL 1 % topical cream APPLY TO AFFECTED AREA(S) OF GROIN/BUTTOCKS AS DIRECTED, TWICE A DAY  99    triamcinolone acetonide (KENALOG) 0.025 % ointment APPLY TO AFFECTED AREA ON GROIN/BUTTOCKS TWICE DAILY  1                  The following regarding medications was addressed:    (The risks and benefits of the proposed medication; the potential medication side effects ie  dry mouth, weight gain,nausea, GI upset, headache; patient given opportunity to ask questions)       2. Counseling and coordination of care including instructions for treatment, risks/benefits, risk factor reduction and patient/family education. He agrees with the plan. Patient instructed to call with any side effects, questions or issues.      Instructed patient to call the clinic, and if after hours call the provider on call ifclient experiences any suicidal thought or ideas to hurt self or other. Also instructed to call 911 or go to the ED. Patient verbalized understanding and agreed to call    3. Follow-up and Dispositions    · Return in about 6 months (around 2/23/2020) for med check and follow up. 4. Other: Nutritional/health counseling on diet and exercise. For reliable dietary information, go to www. [employer].org. PSYCHOTHERAPY:  approx 5-7 minutes  Type:  Supportive/Cognitive Behavioral psychotherapy provided  Focus:  Discussed on sleep hygiene and relaxation techniques. Current problems- worries about son and is able to cope well. Psychoeducation provided  Treatment plan reviewed with patient-including diagnosis and medications    Sujatha Negron is progressing.      Meera Welch NP  8/23/2019

## 2019-09-13 ENCOUNTER — OFFICE VISIT (OUTPATIENT)
Dept: INTERNAL MEDICINE CLINIC | Age: 34
End: 2019-09-13

## 2019-09-13 VITALS
WEIGHT: 201.9 LBS | DIASTOLIC BLOOD PRESSURE: 100 MMHG | HEART RATE: 74 BPM | BODY MASS INDEX: 26.76 KG/M2 | OXYGEN SATURATION: 100 % | RESPIRATION RATE: 16 BRPM | TEMPERATURE: 98.1 F | SYSTOLIC BLOOD PRESSURE: 149 MMHG | HEIGHT: 73 IN

## 2019-09-13 DIAGNOSIS — F41.9 ANXIETY: ICD-10-CM

## 2019-09-13 DIAGNOSIS — Z23 ENCOUNTER FOR IMMUNIZATION: ICD-10-CM

## 2019-09-13 DIAGNOSIS — Z79.899 ENCOUNTER FOR LONG-TERM (CURRENT) USE OF OTHER MEDICATIONS: ICD-10-CM

## 2019-09-13 DIAGNOSIS — Z00.00 VISIT FOR WELL MAN HEALTH CHECK: Primary | ICD-10-CM

## 2019-09-13 DIAGNOSIS — I10 WHITE COAT SYNDROME WITH HYPERTENSION: ICD-10-CM

## 2019-09-13 DIAGNOSIS — I10 ESSENTIAL HYPERTENSION: ICD-10-CM

## 2019-09-13 RX ORDER — METOPROLOL SUCCINATE 50 MG/1
50 TABLET, EXTENDED RELEASE ORAL DAILY
Qty: 30 TAB | Refills: 3 | Status: SHIPPED | OUTPATIENT
Start: 2019-09-13 | End: 2019-12-06 | Stop reason: SDUPTHER

## 2019-09-13 NOTE — PROGRESS NOTES
Chief Complaint   Patient presents with    Complete Physical     he is a 29y.o. year old male who presents for CPE. Complete Physical Exam Questions:    1. Do you follow a low fat diet?  no  2. Are you up to date on your Tdap (<10 years)? Yes  3. Have you ever had a Pneumovax vaccine (>65)? Not applicable   OTV40 Not applicable   KMIP28 Not applicable  4. Have you had Zoster vaccine (>60)? Not applicable  5. Have you had the HPV - Gardasil (13- 26)? No  6. Do you follow an exercise program?  no  7. Do you smoke?  no If > 65 and smoker, have you had a abdominal aortic aneurysm ultrasound screen? Not applicable  8. Do you consider yourself overweight?  no  9. Is there a family history of CAD< age 48? No  10. Is there a family history of Cancer? Yes  11. Do you know your Cancer risks? Yes  12. Have you had a colonoscopy? Not applicable  13. Have you been tested for HIV or other STI's? Yes HIV DNCHH(87-85 y/o)? No   14. Have you had an EKG in the last five years(>50)? Not applicable  15. Have you had a PSA test done this year (50-69)? Not applicable    Other complaints: none    Reviewed and agree with Nurse Note and duplicated in this note. Reviewed PmHx, RxHx, FmHx, SocHx, AllgHx and updated and dated in the chart. Family History   Problem Relation Age of Onset    Hypertension Mother      History reviewed. No pertinent past medical history.    Social History     Socioeconomic History    Marital status:      Spouse name: Not on file    Number of children: Not on file    Years of education: Not on file    Highest education level: Not on file   Tobacco Use    Smoking status: Never Smoker    Smokeless tobacco: Never Used   Substance and Sexual Activity    Alcohol use: Yes     Comment: average 1 beer/day or less    Drug use: No    Sexual activity: Yes        Review of Systems - negative except as listed above      Objective:     Vitals:    09/13/19 0951   BP: (!) 149/100   Pulse: 74 Resp: 16   Temp: 98.1 °F (36.7 °C)   TempSrc: Oral   SpO2: 100%   Weight: 201 lb 14.4 oz (91.6 kg)   Height: 6' 1\" (1.854 m)       Physical Examination: General appearance - alert, well appearing, and in no distress  Eyes - pupils equal and reactive, extraocular eye movements intact  Ears - bilateral TM's and external ear canals normal  Nose - normal and patent, no erythema, discharge or polyps  Mouth - mucous membranes moist, pharynx normal without lesions  Neck - supple, no significant adenopathy  Chest - clear to auscultation, no wheezes, rales or rhonchi, symmetric air entry  Heart - normal rate, regular rhythm, normal S1, S2, no murmurs, rubs, clicks or gallops  Abdomen - soft, nontender, nondistended, no masses or organomegaly  Back exam - full range of motion, no tenderness, palpable spasm or pain on motion  Neurological - alert, oriented, normal speech, no focal findings or movement disorder noted  Musculoskeletal - no joint tenderness, deformity or swelling  Extremities - peripheral pulses normal, no pedal edema, no clubbing or cyanosis  Skin - normal coloration and turgor, no rashes, no suspicious skin lesions noted      Assessment/ Plan:   Diagnoses and all orders for this visit:    1. Visit for well man health check  -     METABOLIC PANEL, COMPREHENSIVE  -     CBC WITH AUTOMATED DIFF  -     LIPID PANEL    2. Encounter for immunization  -     INFLUENZA VIRUS VAC QUAD,SPLIT,PRESV FREE SYRINGE IM  -     ND IMMUNIZ ADMIN,1 SINGLE/COMB VAC/TOXOID    3. White coat syndrome with hypertension    4. Anxiety    5. Essential hypertension  -     metoprolol succinate (TOPROL-XL) 50 mg XL tablet; Take 1 Tab by mouth daily.     6. Encounter for long-term (current) use of other medications  -     HEP B SURFACE AB  -     HEP B SURFACE AG  -     HEPATITIS C VIRUS AB VERIFICATION  -     QUANTIFERON-TB GOLD PLUS  -     LIPID PANEL    Patient is going to start Stelara with his dermatologist and will need additional blood work done today      Labs to be drawn: CBC, CMP, Lipid    I have discussed the diagnosis with the patient and the intended plan as seen in the above orders. The patient has received an after-visit summary and questions were answered concerning future plans. Medication Side Effects and Warnings were discussed with patient,  Patient Labs were reviewed and or requested, and  Patient Past Records were reviewed and or requested  yes         Pt agrees to call or return to clinic and/or go to closest ER with any worsening of symptoms. This may include, but not limited to increased fever (>100.4) with NSAIDS or Tylenol, increased edema, confusion, rash, worsening of presenting symptoms.

## 2019-09-16 LAB
ALBUMIN SERPL-MCNC: 5.3 G/DL (ref 3.5–5.5)
ALBUMIN/GLOB SERPL: 1.8 {RATIO} (ref 1.2–2.2)
ALP SERPL-CCNC: 76 IU/L (ref 39–117)
ALT SERPL-CCNC: 32 IU/L (ref 0–44)
AST SERPL-CCNC: 21 IU/L (ref 0–40)
BASOPHILS # BLD AUTO: 0.1 X10E3/UL (ref 0–0.2)
BASOPHILS NFR BLD AUTO: 1 %
BILIRUB SERPL-MCNC: 0.5 MG/DL (ref 0–1.2)
BUN SERPL-MCNC: 13 MG/DL (ref 6–20)
BUN/CREAT SERPL: 12 (ref 9–20)
CALCIUM SERPL-MCNC: 9.9 MG/DL (ref 8.7–10.2)
CHLORIDE SERPL-SCNC: 98 MMOL/L (ref 96–106)
CHOLEST SERPL-MCNC: 212 MG/DL (ref 100–199)
CO2 SERPL-SCNC: 25 MMOL/L (ref 20–29)
CREAT SERPL-MCNC: 1.12 MG/DL (ref 0.76–1.27)
EOSINOPHIL # BLD AUTO: 0.3 X10E3/UL (ref 0–0.4)
EOSINOPHIL NFR BLD AUTO: 6 %
ERYTHROCYTE [DISTWIDTH] IN BLOOD BY AUTOMATED COUNT: 12 % (ref 12.3–15.4)
GLOBULIN SER CALC-MCNC: 2.9 G/DL (ref 1.5–4.5)
GLUCOSE SERPL-MCNC: 91 MG/DL (ref 65–99)
HBV SURFACE AB SER QL: REACTIVE
HBV SURFACE AG SERPL QL IA: NEGATIVE
HCT VFR BLD AUTO: 45.2 % (ref 37.5–51)
HCV AB SERPL QL IA: NON REACTIVE
HDLC SERPL-MCNC: 71 MG/DL
HGB BLD-MCNC: 15.6 G/DL (ref 13–17.7)
IMM GRANULOCYTES # BLD AUTO: 0 X10E3/UL (ref 0–0.1)
IMM GRANULOCYTES NFR BLD AUTO: 0 %
LDLC SERPL CALC-MCNC: 127 MG/DL (ref 0–99)
LYMPHOCYTES # BLD AUTO: 1.3 X10E3/UL (ref 0.7–3.1)
LYMPHOCYTES NFR BLD AUTO: 26 %
MCH RBC QN AUTO: 31.2 PG (ref 26.6–33)
MCHC RBC AUTO-ENTMCNC: 34.5 G/DL (ref 31.5–35.7)
MCV RBC AUTO: 90 FL (ref 79–97)
MONOCYTES # BLD AUTO: 0.4 X10E3/UL (ref 0.1–0.9)
MONOCYTES NFR BLD AUTO: 8 %
NEUTROPHILS # BLD AUTO: 3 X10E3/UL (ref 1.4–7)
NEUTROPHILS NFR BLD AUTO: 59 %
PLATELET # BLD AUTO: 255 X10E3/UL (ref 150–450)
POTASSIUM SERPL-SCNC: 5 MMOL/L (ref 3.5–5.2)
PROT SERPL-MCNC: 8.2 G/DL (ref 6–8.5)
RBC # BLD AUTO: 5 X10E6/UL (ref 4.14–5.8)
SODIUM SERPL-SCNC: 140 MMOL/L (ref 134–144)
TRIGL SERPL-MCNC: 69 MG/DL (ref 0–149)
VLDLC SERPL CALC-MCNC: 14 MG/DL (ref 5–40)
WBC # BLD AUTO: 5.1 X10E3/UL (ref 3.4–10.8)

## 2019-09-16 NOTE — PROGRESS NOTES
Your \"Bad\" cholesterol (LDL and/or triglycerides) are elevated. Please eat a healthier diet as described below. In particular avoid fried, fatty and junk foods, while increasing fiber (fruits and vegetables). If you cannot increase fiber through diet, you can supplement with metamucil as directed on bottle daily. Also, make sure you are taking 1 to 2 grams of over the counter fish oil. Increase exercise to 5 times per week of cardio lasting at least 30 min's each (biking, walking, elliptical, swimming). Lets recheck the fasting (atleast eight hours) in 6 months. Mediterranean diet: Choose this heart-healthy diet option  The Mediterranean diet is a heart-healthy eating plan combining elements of Mediterranean-style cooking. Here's how to adopt the Mediterranean diet. If you're looking for a heart-healthy eating plan, the Mediterranean diet might be right for you. The Mediterranean diet incorporates the basics of healthy eating  plus a splash of flavorful olive oil and perhaps a glass of red wine  among other components characterizing the traditional cooking style of countries bordering the [facility]. Most healthy diets include fruits, vegetables, fish and whole grains, and limit unhealthy fats. While these parts of a healthy diet remain tried-and-true, subtle variations or differences in proportions of certain foods may make a difference in your risk of heart disease. Benefits of the [address] has shown that the traditional Mediterranean diet reduces the risk of heart disease. In fact, a recent analysis of more than 1.5 million healthy adults demonstrated that following a Mediterranean diet was associated with a reduced risk of overall and cardiovascular mortality, a reduced incidence of cancer and cancer mortality, and a reduced incidence of Parkinson's and Alzheimer's diseases.    For this reason, most if not all major scientific organizations encourage healthy adults to adapt a style of eating like that of the [address] [facility] for prevention of major chronic diseases. Key components of the Mediterranean diet  The Mediterranean diet emphasizes:   Getting plenty of exercise   Eating primarily plant-based foods, such as fruits and vegetables, whole grains, legumes and nuts   Replacing butter with healthy fats such as olive oil and canola oil   Using herbs and spices instead of salt to flavor foods   Limiting red meat to no more than a few times a month   Eating fish and poultry at least twice a week   Drinking red wine in moderation (optional)   The diet also recognizes the importance of enjoying meals with family and friends. Fruits, vegetables, nuts and grains  The Mediterranean diet traditionally includes fruits, vegetables, pasta and rice. For example, residents of [facility] eat very little red meat and average nine servings a day of antioxidant-rich fruits and vegetables. The Mediterranean diet has been associated with a lower level of oxidized low-density lipoprotein (LDL) cholesterol  the \"bad\" cholesterol that's more likely to build up deposits in your arteries. Nuts are another part of a healthy Mediterranean diet. Nuts are high in fat (approximately 80 percent of their calories come from fat), but most of the fat is not saturated. Because nuts are high in calories, they should not be eaten in large amounts  generally no more than a handful a day. For the best nutrition, avoid candied or honey-roasted and heavily salted nuts. Grains in the [address] [place] [address] region are typically whole grain and usually contain very few unhealthy trans fats, and bread is an important part of the diet there. However, throughout the [address] [place] [address] region, bread is eaten plain or dipped in olive oil  not eaten with butter or margarines, which contain saturated or trans fats.

## 2019-09-19 LAB
GAMMA INTERFERON BACKGROUND BLD IA-ACNC: 0.01 IU/ML
M TB IFN-G BLD-IMP: NEGATIVE
M TB IFN-G CD4+ BCKGRND COR BLD-ACNC: 0.02 IU/ML
MITOGEN IGNF BLD-ACNC: >10 IU/ML
QUANTIFERON INCUBATION, QF1T: NORMAL
QUANTIFERON TB2 AG: 0.02 IU/ML
SERVICE CMNT-IMP: NORMAL

## 2019-11-11 DIAGNOSIS — F41.0 PANIC ATTACKS: ICD-10-CM

## 2019-11-11 DIAGNOSIS — F41.1 GAD (GENERALIZED ANXIETY DISORDER): ICD-10-CM

## 2019-11-12 RX ORDER — CITALOPRAM 20 MG/1
30 TABLET, FILM COATED ORAL DAILY
Qty: 135 TAB | Refills: 0 | Status: SHIPPED | OUTPATIENT
Start: 2019-11-12 | End: 2020-01-16 | Stop reason: SDUPTHER

## 2019-12-06 DIAGNOSIS — I10 ESSENTIAL HYPERTENSION: ICD-10-CM

## 2019-12-06 RX ORDER — METOPROLOL SUCCINATE 50 MG/1
TABLET, EXTENDED RELEASE ORAL
Qty: 90 TAB | Refills: 1 | Status: SHIPPED | OUTPATIENT
Start: 2019-12-06 | End: 2020-06-02

## 2020-01-16 DIAGNOSIS — F41.1 GAD (GENERALIZED ANXIETY DISORDER): ICD-10-CM

## 2020-01-16 DIAGNOSIS — F41.0 PANIC ATTACKS: ICD-10-CM

## 2020-01-16 RX ORDER — CITALOPRAM 20 MG/1
30 TABLET, FILM COATED ORAL DAILY
Qty: 135 TAB | Refills: 0 | Status: SHIPPED | OUTPATIENT
Start: 2020-01-16 | End: 2020-04-20 | Stop reason: SDUPTHER

## 2020-03-23 DIAGNOSIS — F41.1 GAD (GENERALIZED ANXIETY DISORDER): ICD-10-CM

## 2020-03-23 DIAGNOSIS — F41.0 PANIC ATTACKS: ICD-10-CM

## 2020-03-23 RX ORDER — CITALOPRAM 20 MG/1
30 TABLET, FILM COATED ORAL DAILY
Qty: 135 TAB | Refills: 0 | OUTPATIENT
Start: 2020-03-23

## 2020-04-15 ENCOUNTER — NURSE TRIAGE (OUTPATIENT)
Dept: OTHER | Facility: CLINIC | Age: 35
End: 2020-04-15

## 2020-04-15 NOTE — TELEPHONE ENCOUNTER
Pt has had a cough going on for about 3 weeks now. He is not coughing up any phlegm. He has not had a fever. He has no asthma or other underlying conditions. He is working outside the home half of the time but has no known exposure. Reason for Disposition   Cough    Answer Assessment - Initial Assessment Questions  1. ONSET: \"When did the cough begin? \"       3 weeks  2. SEVERITY: \"How bad is the cough today? \"       mild  3. RESPIRATORY DISTRESS: \"Describe your breathing. \"       no  4. FEVER: \"Do you have a fever? \" If so, ask: \"What is your temperature, how was it measured, and when did it start? \"      no  5. HEMOPTYSIS: \"Are you coughing up any blood? \" If so ask: \"How much? \" (flecks, streaks, tablespoons, etc.)      no  6. TREATMENT: \"What have you done so far to treat the cough? \" (e.g., meds, fluids, humidifier)      none  7. CARDIAC HISTORY: \"Do you have any history of heart disease? \" (e.g., heart attack, congestive heart failure)       no  8. LUNG HISTORY: \"Do you have any history of lung disease? \"  (e.g., pulmonary embolus, asthma, emphysema)      no  9. PE RISK FACTORS: \"Do you have a history of blood clots? \" (or: recent major surgery, recent prolonged travel, bedridden)      no  10. OTHER SYMPTOMS: \"Do you have any other symptoms? (e.g., runny nose, wheezing, chest pain)        no  11. PREGNANCY: \"Is there any chance you are pregnant? \" \"When was your last menstrual period? \"        no  12. TRAVEL: \"Have you traveled out of the country in the last month? \" (e.g., travel history, exposures)        no    Protocols used: COUGH - ACUTE NON-PRODUCTIVE-ADULT-AH

## 2020-04-20 ENCOUNTER — VIRTUAL VISIT (OUTPATIENT)
Dept: INTERNAL MEDICINE CLINIC | Age: 35
End: 2020-04-20

## 2020-04-20 DIAGNOSIS — F41.1 GAD (GENERALIZED ANXIETY DISORDER): Primary | ICD-10-CM

## 2020-04-20 DIAGNOSIS — F41.0 PANIC ATTACKS: ICD-10-CM

## 2020-04-20 RX ORDER — CITALOPRAM 20 MG/1
30 TABLET, FILM COATED ORAL DAILY
Qty: 135 TAB | Refills: 0 | Status: SHIPPED | OUTPATIENT
Start: 2020-04-20 | End: 2020-07-20

## 2020-04-20 NOTE — PROGRESS NOTES
Consent: Osmar Hinds, who was seen by synchronous (real-time) audio-video technology, and/or his healthcare decision maker, is aware that this patient-initiated, Telehealth encounter on 4/20/2020 is a billable service, with coverage as determined by his insurance carrier. He is aware that he may receive a bill and has provided verbal consent to proceed: Yes. Assessment & Plan:   Diagnoses and all orders for this visit:    1. OCTAVIO (generalized anxiety disorder)  -     citalopram (CELEXA) 20 mg tablet; Take 1.5 Tabs by mouth daily. Indications: repeated episodes of anxiety    2. Panic attacks  -     citalopram (CELEXA) 20 mg tablet; Take 1.5 Tabs by mouth daily. Indications: repeated episodes of anxiety    rtc for yearly exam              I spent at least 15 minutes with this established patient, and >50% of the time was spent counseling and/or coordinating care regarding depression  712  Subjective:   Osmar Hinds is a 29 y.o. male who was seen for Anxiety      Prior to Admission medications    Medication Sig Start Date End Date Taking? Authorizing Provider   citalopram (CELEXA) 20 mg tablet Take 1.5 Tabs by mouth daily. Indications: repeated episodes of anxiety 1/16/20  Yes Shaina Ugalde NP   metoprolol succinate (TOPROL-XL) 50 mg XL tablet TAKE 1 TABLET BY MOUTH EVERY DAY 12/6/19  Yes Gloria Mcnair MD   ALPRAZolam Maria Esther Pile) 0.5 mg tablet Take 0.5 Tabs by mouth daily as needed for Anxiety. 5/13/19  Yes Shaina Ugalde NP   clobetasol (OLUX) 0.05 % topical foam APPLY TO AFFECTED AREA TWICE A DAY SCALP FOR FLARES 1/19/19  Yes Provider, Historical     No Known Allergies    Patient Active Problem List   Diagnosis Code    White coat hypertension [id number]     Patient Active Problem List    Diagnosis Date Noted    White coat hypertension 12/07/2016     Current Outpatient Medications   Medication Sig Dispense Refill    citalopram (CELEXA) 20 mg tablet Take 1.5 Tabs by mouth daily.  Indications: repeated episodes of anxiety 135 Tab 0    metoprolol succinate (TOPROL-XL) 50 mg XL tablet TAKE 1 TABLET BY MOUTH EVERY DAY 90 Tab 1    ALPRAZolam (XANAX) 0.5 mg tablet Take 0.5 Tabs by mouth daily as needed for Anxiety. 10 Tab 0    clobetasol (OLUX) 0.05 % topical foam APPLY TO AFFECTED AREA TWICE A DAY SCALP FOR FLARES  1     No Known Allergies  History reviewed. No pertinent past medical history. Past Surgical History:   Procedure Laterality Date    HX OTHER SURGICAL      wisdom teeth     Family History   Problem Relation Age of Onset    Hypertension Mother        Review of Systems   All other systems reviewed and are negative. Objective:   Vital Signs: (As obtained by patient/caregiver at home)  There were no vitals taken for this visit.      [INSTRUCTIONS:  \"[x]\" Indicates a positive item  \"[]\" Indicates a negative item  -- DELETE ALL ITEMS NOT EXAMINED]    Constitutional: [x] Appears well-developed and well-nourished [x] No apparent distress      [] Abnormal -     Mental status: [x] Alert and awake  [x] Oriented to person/place/time [x] Able to follow commands    [] Abnormal -     Eyes:   EOM    [x]  Normal    [] Abnormal -   Sclera  [x]  Normal    [] Abnormal -          Discharge [x]  None visible   [] Abnormal -     HENT: [x] Normocephalic, atraumatic  [] Abnormal -   [x] Mouth/Throat: Mucous membranes are moist    External Ears [x] Normal  [] Abnormal -    Neck: [x] No visualized mass [] Abnormal -     Pulmonary/Chest: [x] Respiratory effort normal   [x] No visualized signs of difficulty breathing or respiratory distress        [] Abnormal -      Musculoskeletal:   [x] Normal gait with no signs of ataxia         [x] Normal range of motion of neck        [] Abnormal -     Neurological:        [x] No Facial Asymmetry (Cranial nerve 7 motor function) (limited exam due to video visit)          [x] No gaze palsy        [] Abnormal -          Skin:        [x] No significant exanthematous lesions or discoloration noted on facial skin         [] Abnormal -            Psychiatric:       [x] Normal Affect [] Abnormal -        [x] No Hallucinations    Other pertinent observable physical exam findings:-        We discussed the expected course, resolution and complications of the diagnosis(es) in detail. Medication risks, benefits, costs, interactions, and alternatives were discussed as indicated. I advised him to contact the office if his condition worsens, changes or fails to improve as anticipated. He expressed understanding with the diagnosis(es) and plan. Claudia Welch is a 29 y.o. male being evaluated by a video visit encounter for concerns as above. A caregiver was present when appropriate. Due to this being a TeleHealth encounter (During LGWVQ-38 public health emergency), evaluation of the following organ systems was limited: Vitals/Constitutional/EENT/Resp/CV/GI//MS/Neuro/Skin/Heme-Lymph-Imm. Pursuant to the emergency declaration under the [facility], [facility] waiver authority and the [employer] and [employer] Act, this Virtual  Visit was conducted, with patient's (and/or legal guardian's) consent, to reduce the patient's risk of exposure to COVID-19 and provide necessary medical care. Services were provided through a video synchronous discussion virtually to substitute for in-person clinic visit. Patient and provider were located at their individual homes.         Ernie Pérez MD

## 2020-05-22 ENCOUNTER — VIRTUAL VISIT (OUTPATIENT)
Dept: INTERNAL MEDICINE CLINIC | Age: 35
End: 2020-05-22

## 2020-05-22 ENCOUNTER — DOCUMENTATION ONLY (OUTPATIENT)
Dept: INTERNAL MEDICINE CLINIC | Age: 35
End: 2020-05-22

## 2020-05-22 DIAGNOSIS — K21.00 GASTROESOPHAGEAL REFLUX DISEASE WITH ESOPHAGITIS: ICD-10-CM

## 2020-05-22 DIAGNOSIS — R10.11 RIGHT UPPER QUADRANT ABDOMINAL PAIN: Primary | ICD-10-CM

## 2020-05-22 RX ORDER — FAMOTIDINE 40 MG/1
40 TABLET, FILM COATED ORAL DAILY
Qty: 30 TAB | Refills: 1 | Status: SHIPPED | OUTPATIENT
Start: 2020-05-22 | End: 2020-06-15

## 2020-05-22 NOTE — PROGRESS NOTES
Marck Thomas is a 29 y.o. male who was seen by synchronous (real-time) audio-video technology on 5/22/2020. Consent: Marck Thomas, who was seen by synchronous (real-time) audio-video technology, and/or his healthcare decision maker, is aware that this patient-initiated, Telehealth encounter on 5/22/2020 is a billable service, with coverage as determined by his insurance carrier. He is aware that he may receive a bill and has provided verbal consent to proceed: Yes. Assessment & Plan:   Diagnoses and all orders for this visit:    1. Right upper quadrant abdominal pain  -     H PYLORI AB, IGG, QT    2. Gastroesophageal reflux disease with esophagitis    Other orders  -     famotidine (PEPCID) 40 mg tablet; Take 1 Tab by mouth daily. Will take Pepcid for 3 weeks. Follow-up in we will reassess 3 weeks. Subjective:   Marck Thomas is a 29 y.o. male who was seen for  GERD type symptoms. He has been experiencing fullness after meals, upper abdominal discomfort for many minute(s), acute over time for about 2 weeks. Patient started taking Pepcid over-the-counter which is helped resolve some of his issues. . ROS: patient denies weight loss, dysphagia, hematemesis or constipation. Social history: no or minimal alcohol, nonsmoker, caffeine use is moderate-to-high daily. Prior to Admission medications    Medication Sig Start Date End Date Taking? Authorizing Provider   citalopram (CELEXA) 20 mg tablet Take 1.5 Tabs by mouth daily. Indications: repeated episodes of anxiety 4/20/20   Harris Urbina MD   metoprolol succinate (TOPROL-XL) 50 mg XL tablet TAKE 1 TABLET BY MOUTH EVERY DAY 12/6/19   Harris Urbina MD   ALPRAZolam Madeline Basset) 0.5 mg tablet Take 0.5 Tabs by mouth daily as needed for Anxiety.  5/13/19   Shaina Ugalde, NP   clobetasol (OLUX) 0.05 % topical foam APPLY TO AFFECTED AREA TWICE A DAY SCALP FOR FLARES 1/19/19   Provider, Historical     No Known Allergies    Patient Active Problem List    Diagnosis Date Noted    White coat hypertension 12/07/2016     Current Outpatient Medications   Medication Sig Dispense Refill    citalopram (CELEXA) 20 mg tablet Take 1.5 Tabs by mouth daily. Indications: repeated episodes of anxiety 135 Tab 0    metoprolol succinate (TOPROL-XL) 50 mg XL tablet TAKE 1 TABLET BY MOUTH EVERY DAY 90 Tab 1    ALPRAZolam (XANAX) 0.5 mg tablet Take 0.5 Tabs by mouth daily as needed for Anxiety. 10 Tab 0    clobetasol (OLUX) 0.05 % topical foam APPLY TO AFFECTED AREA TWICE A DAY SCALP FOR FLARES  1     No Known Allergies  No past medical history on file. Past Surgical History:   Procedure Laterality Date    HX OTHER SURGICAL      wisdom teeth     Family History   Problem Relation Age of Onset    Hypertension Mother        Review of Systems   All other systems reviewed and are negative. Objective:   Vital Signs: (As obtained by patient/caregiver at home)  There were no vitals taken for this visit.      [INSTRUCTIONS:  \"[x]\" Indicates a positive item  \"[]\" Indicates a negative item  -- DELETE ALL ITEMS NOT EXAMINED]    Constitutional: [x] Appears well-developed and well-nourished [x] No apparent distress      [] Abnormal -     Mental status: [x] Alert and awake  [x] Oriented to person/place/time [x] Able to follow commands    [] Abnormal -     Eyes:   EOM    [x]  Normal    [] Abnormal -   Sclera  [x]  Normal    [] Abnormal -          Discharge [x]  None visible   [] Abnormal -     HENT: [x] Normocephalic, atraumatic  [] Abnormal -   [x] Mouth/Throat: Mucous membranes are moist    External Ears [x] Normal  [] Abnormal -    Neck: [x] No visualized mass [] Abnormal -     Pulmonary/Chest: [x] Respiratory effort normal   [x] No visualized signs of difficulty breathing or respiratory distress        [] Abnormal -      Musculoskeletal:   [x] Normal gait with no signs of ataxia         [x] Normal range of motion of neck        [] Abnormal -     Neurological:        [x] No Facial Asymmetry (Cranial nerve 7 motor function) (limited exam due to video visit)          [x] No gaze palsy        [] Abnormal -          Skin:        [x] No significant exanthematous lesions or discoloration noted on facial skin         [] Abnormal -            Psychiatric:       [x] Normal Affect [] Abnormal -        [x] No Hallucinations    Other pertinent observable physical exam findings:-        We discussed the expected course, resolution and complications of the diagnosis(es) in detail. Medication risks, benefits, costs, interactions, and alternatives were discussed as indicated. I advised him to contact the office if his condition worsens, changes or fails to improve as anticipated. He expressed understanding with the diagnosis(es) and plan. Zoila Buenrostro is a 29 y.o. male who was evaluated by a video visit encounter for concerns as above. Patient identification was verified prior to start of the visit. A caregiver was present when appropriate. Due to this being a TeleHealth encounter ([facility]), evaluation of the following organ systems was limited: Vitals/Constitutional/EENT/Resp/CV/GI//MS/Neuro/Skin/Heme-Lymph-Imm. Pursuant to the emergency declaration under the [facility], 1135 waiver authority and the [employer] and [employer] Act, this Virtual  Visit was conducted, with patient's (and/or legal guardian's) consent, to reduce the patient's risk of exposure to COVID-19 and provide necessary medical care. Services were provided through a video synchronous discussion virtually to substitute for in-person clinic visit. Patient and provider were located at their individual homes.       Eva Vincent MD

## 2020-05-26 ENCOUNTER — CLINICAL SUPPORT (OUTPATIENT)
Dept: INTERNAL MEDICINE CLINIC | Age: 35
End: 2020-05-26

## 2020-05-26 DIAGNOSIS — Z11.1 SCREENING-PULMONARY TB: ICD-10-CM

## 2020-05-26 DIAGNOSIS — I10 ESSENTIAL HYPERTENSION: Primary | ICD-10-CM

## 2020-05-26 DIAGNOSIS — R10.11 RIGHT UPPER QUADRANT ABDOMINAL PAIN: ICD-10-CM

## 2020-05-27 LAB — H PYLORI IGG SER IA-ACNC: 0.5 INDEX VALUE (ref 0–0.79)

## 2020-05-29 LAB
ALBUMIN SERPL-MCNC: 5 G/DL (ref 4–5)
ALBUMIN/GLOB SERPL: 2.1 {RATIO} (ref 1.2–2.2)
ALP SERPL-CCNC: 80 IU/L (ref 39–117)
ALT SERPL-CCNC: 39 IU/L (ref 0–44)
AST SERPL-CCNC: 17 IU/L (ref 0–40)
BASOPHILS # BLD AUTO: 0.1 X10E3/UL (ref 0–0.2)
BASOPHILS NFR BLD AUTO: 1 %
BILIRUB SERPL-MCNC: 0.4 MG/DL (ref 0–1.2)
BUN SERPL-MCNC: 16 MG/DL (ref 6–20)
BUN/CREAT SERPL: 15 (ref 9–20)
CALCIUM SERPL-MCNC: 9.7 MG/DL (ref 8.7–10.2)
CHLORIDE SERPL-SCNC: 101 MMOL/L (ref 96–106)
CO2 SERPL-SCNC: 21 MMOL/L (ref 20–29)
CREAT SERPL-MCNC: 1.06 MG/DL (ref 0.76–1.27)
EOSINOPHIL # BLD AUTO: 0.4 X10E3/UL (ref 0–0.4)
EOSINOPHIL NFR BLD AUTO: 7 %
ERYTHROCYTE [DISTWIDTH] IN BLOOD BY AUTOMATED COUNT: 12.2 % (ref 11.6–15.4)
GAMMA INTERFERON BACKGROUND BLD IA-ACNC: 0.02 IU/ML
GLOBULIN SER CALC-MCNC: 2.4 G/DL (ref 1.5–4.5)
GLUCOSE SERPL-MCNC: 90 MG/DL (ref 65–99)
HCT VFR BLD AUTO: 42.6 % (ref 37.5–51)
HGB BLD-MCNC: 14.5 G/DL (ref 13–17.7)
IMM GRANULOCYTES # BLD AUTO: 0 X10E3/UL (ref 0–0.1)
IMM GRANULOCYTES NFR BLD AUTO: 0 %
LYMPHOCYTES # BLD AUTO: 1.5 X10E3/UL (ref 0.7–3.1)
LYMPHOCYTES NFR BLD AUTO: 28 %
M TB IFN-G BLD-IMP: NEGATIVE
M TB IFN-G CD4+ BCKGRND COR BLD-ACNC: 0.02 IU/ML
MCH RBC QN AUTO: 30.8 PG (ref 26.6–33)
MCHC RBC AUTO-ENTMCNC: 34 G/DL (ref 31.5–35.7)
MCV RBC AUTO: 90 FL (ref 79–97)
MITOGEN IGNF BLD-ACNC: >10 IU/ML
MONOCYTES # BLD AUTO: 0.5 X10E3/UL (ref 0.1–0.9)
MONOCYTES NFR BLD AUTO: 10 %
NEUTROPHILS # BLD AUTO: 2.8 X10E3/UL (ref 1.4–7)
NEUTROPHILS NFR BLD AUTO: 54 %
PLATELET # BLD AUTO: 288 X10E3/UL (ref 150–450)
POTASSIUM SERPL-SCNC: 4.6 MMOL/L (ref 3.5–5.2)
PROT SERPL-MCNC: 7.4 G/DL (ref 6–8.5)
QUANTIFERON INCUBATION, QF1T: NORMAL
QUANTIFERON TB2 AG: 0.01 IU/ML
RBC # BLD AUTO: 4.71 X10E6/UL (ref 4.14–5.8)
SERVICE CMNT-IMP: NORMAL
SODIUM SERPL-SCNC: 138 MMOL/L (ref 134–144)
WBC # BLD AUTO: 5.2 X10E3/UL (ref 3.4–10.8)

## 2020-06-02 DIAGNOSIS — I10 ESSENTIAL HYPERTENSION: ICD-10-CM

## 2020-06-02 RX ORDER — METOPROLOL SUCCINATE 50 MG/1
TABLET, EXTENDED RELEASE ORAL
Qty: 90 TAB | Refills: 1 | Status: SHIPPED | OUTPATIENT
Start: 2020-06-02 | End: 2020-12-17

## 2020-06-15 RX ORDER — FAMOTIDINE 40 MG/1
TABLET, FILM COATED ORAL
Qty: 30 TAB | Refills: 1 | Status: SHIPPED | OUTPATIENT
Start: 2020-06-15 | End: 2020-07-23

## 2020-07-19 DIAGNOSIS — F41.1 GAD (GENERALIZED ANXIETY DISORDER): ICD-10-CM

## 2020-07-19 DIAGNOSIS — F41.0 PANIC ATTACKS: ICD-10-CM

## 2020-07-20 RX ORDER — CITALOPRAM 20 MG/1
30 TABLET, FILM COATED ORAL DAILY
Qty: 135 TAB | Refills: 0 | Status: SHIPPED | OUTPATIENT
Start: 2020-07-20 | End: 2020-10-19

## 2020-07-23 RX ORDER — FAMOTIDINE 40 MG/1
TABLET, FILM COATED ORAL
Qty: 30 TAB | Refills: 1 | Status: SHIPPED | OUTPATIENT
Start: 2020-07-23 | End: 2021-08-04 | Stop reason: ALTCHOICE

## 2020-10-19 DIAGNOSIS — F41.0 PANIC ATTACKS: ICD-10-CM

## 2020-10-19 DIAGNOSIS — F41.1 GAD (GENERALIZED ANXIETY DISORDER): ICD-10-CM

## 2020-10-19 RX ORDER — CITALOPRAM 20 MG/1
30 TABLET, FILM COATED ORAL DAILY
Qty: 135 TAB | Refills: 0 | Status: SHIPPED | OUTPATIENT
Start: 2020-10-19 | End: 2021-01-26

## 2020-12-17 DIAGNOSIS — I10 ESSENTIAL HYPERTENSION: ICD-10-CM

## 2020-12-17 RX ORDER — METOPROLOL SUCCINATE 50 MG/1
TABLET, EXTENDED RELEASE ORAL
Qty: 90 TAB | Refills: 1 | Status: SHIPPED | OUTPATIENT
Start: 2020-12-17 | End: 2021-08-04 | Stop reason: SDUPTHER

## 2021-01-26 DIAGNOSIS — F41.0 PANIC ATTACKS: ICD-10-CM

## 2021-01-26 DIAGNOSIS — F41.1 GAD (GENERALIZED ANXIETY DISORDER): ICD-10-CM

## 2021-01-26 RX ORDER — CITALOPRAM 20 MG/1
30 TABLET, FILM COATED ORAL DAILY
Qty: 135 TAB | Refills: 0 | Status: SHIPPED | OUTPATIENT
Start: 2021-01-26 | End: 2021-04-19

## 2021-04-17 DIAGNOSIS — F41.0 PANIC ATTACKS: ICD-10-CM

## 2021-04-17 DIAGNOSIS — F41.1 GAD (GENERALIZED ANXIETY DISORDER): ICD-10-CM

## 2021-04-19 RX ORDER — CITALOPRAM 20 MG/1
30 TABLET, FILM COATED ORAL DAILY
Qty: 135 TAB | Refills: 0 | Status: SHIPPED | OUTPATIENT
Start: 2021-04-19 | End: 2021-07-14 | Stop reason: SDUPTHER

## 2021-07-14 DIAGNOSIS — F41.1 GAD (GENERALIZED ANXIETY DISORDER): ICD-10-CM

## 2021-07-14 DIAGNOSIS — F41.0 PANIC ATTACKS: ICD-10-CM

## 2021-07-14 RX ORDER — CITALOPRAM 20 MG/1
30 TABLET, FILM COATED ORAL DAILY
Qty: 45 TABLET | Refills: 0 | Status: SHIPPED | OUTPATIENT
Start: 2021-07-14 | End: 2021-09-03

## 2021-08-04 ENCOUNTER — OFFICE VISIT (OUTPATIENT)
Dept: INTERNAL MEDICINE CLINIC | Age: 36
End: 2021-08-04
Payer: COMMERCIAL

## 2021-08-04 VITALS
WEIGHT: 196 LBS | RESPIRATION RATE: 16 BRPM | BODY MASS INDEX: 25.98 KG/M2 | DIASTOLIC BLOOD PRESSURE: 91 MMHG | HEIGHT: 73 IN | TEMPERATURE: 98 F | OXYGEN SATURATION: 98 % | SYSTOLIC BLOOD PRESSURE: 137 MMHG | HEART RATE: 66 BPM

## 2021-08-04 DIAGNOSIS — Z00.00 VISIT FOR WELL MAN HEALTH CHECK: Primary | ICD-10-CM

## 2021-08-04 DIAGNOSIS — F41.0 PANIC ATTACKS: ICD-10-CM

## 2021-08-04 DIAGNOSIS — Z79.899 ENCOUNTER FOR LONG-TERM (CURRENT) USE OF OTHER MEDICATIONS: ICD-10-CM

## 2021-08-04 DIAGNOSIS — I10 ESSENTIAL HYPERTENSION: ICD-10-CM

## 2021-08-04 PROCEDURE — 99395 PREV VISIT EST AGE 18-39: CPT | Performed by: FAMILY MEDICINE

## 2021-08-04 PROCEDURE — 99214 OFFICE O/P EST MOD 30 MIN: CPT | Performed by: FAMILY MEDICINE

## 2021-08-04 RX ORDER — METOPROLOL SUCCINATE 50 MG/1
TABLET, EXTENDED RELEASE ORAL
Qty: 90 TABLET | Refills: 1 | Status: SHIPPED | OUTPATIENT
Start: 2021-08-04 | End: 2022-02-21

## 2021-08-04 RX ORDER — USTEKINUMAB 90 MG/ML
INJECTION, SOLUTION SUBCUTANEOUS
COMMUNITY
Start: 2021-07-16

## 2021-08-04 NOTE — PROGRESS NOTES
Chief Complaint   Patient presents with    Complete Physical     Patient is here for a wellness visit      he is a 39y.o. year old male who presents for CPE. Complete Physical Exam Questions:    1. Do you follow a low fat diet? yes  2. Are you up to date on your Tdap (<10 years)? Yes  3. Have you ever had a Pneumovax vaccine (>65)? Not applicable   JXZ43 Not applicable   PJRN21 Not applicable  4. Have you had Zoster vaccine (>60)? Not applicable  5. Have you had the HPV - Gardasil (13- 26)? Not applicable  6. Do you follow an exercise program?  yes  7. Do you smoke?  no If > 65 and smoker, have you had a abdominal aortic aneurysm ultrasound screen? No  8. Do you consider yourself overweight?  no  9. Is there a family history of CAD< age 48? No  10. Is there a family history of Cancer? Yes  11. Do you know your Cancer risks? No  12. Have you had a colonoscopy? Not applicable  13. Have you been tested for HIV or other STI's? Yes HIV VMIYC(23-79 y/o)? No   14. Have you had an EKG in the last five years(>50)? Not applicable  15. Have you had a PSA test done this year (50-69)? Not applicable    Other complaints: none    Reviewed and agree with Nurse Note and duplicated in this note. Reviewed PmHx, RxHx, FmHx, SocHx, AllgHx and updated and dated in the chart. Family History   Problem Relation Age of Onset    Hypertension Mother      History reviewed. No pertinent past medical history.    Social History     Socioeconomic History    Marital status:      Spouse name: Not on file    Number of children: Not on file    Years of education: Not on file    Highest education level: Not on file   Tobacco Use    Smoking status: Never Smoker    Smokeless tobacco: Never Used   Substance and Sexual Activity    Alcohol use: Yes     Comment: average 1 beer/day or less    Drug use: No    Sexual activity: Yes     Social Determinants of Health     Financial Resource Strain:     Difficulty of Paying Living Expenses:    Food Insecurity:     Worried About [address] [employer] in the Last Year:     [address] [facility] [address] in the Last Year:    Transportation Needs:     Lack of Transportation (Medical):  Lack of Transportation (Non-Medical):    Physical Activity:     Days of Exercise per Week:     Minutes of Exercise per Session:    Stress:     Feeling of Stress :    Social Connections:     Frequency of Communication with Friends and Family:     Frequency of Social Gatherings with Friends and Family:     Attends Yazdanism Services:     Active Member of Clubs or Organizations:     Attends Club or Organization Meetings:     Marital Status:         Review of Systems - negative except as listed above      Objective:     Vitals:    08/04/21 1001   BP: (!) 137/91   Pulse: 66   Resp: 16   Temp: 98 °F (36.7 °C)   SpO2: 98%   Weight: 196 lb (88.9 kg)   Height: 6' 1\" (1.854 m)       Physical Examination: General appearance - alert, well appearing, and in no distress  Eyes - pupils equal and reactive, extraocular eye movements intact  Ears - bilateral TM's and external ear canals normal  Nose - normal and patent, no erythema, discharge or polyps  Mouth - mucous membranes moist, pharynx normal without lesions  Neck - supple, no significant adenopathy  Chest - clear to auscultation, no wheezes, rales or rhonchi, symmetric air entry  Heart - normal rate, regular rhythm, normal S1, S2, no murmurs, rubs, clicks or gallops  Abdomen - soft, nontender, nondistended, no masses or organomegaly  Neurological - alert, oriented, normal speech, no focal findings or movement disorder noted  Musculoskeletal - no joint tenderness, deformity or swelling  Extremities - peripheral pulses normal, no pedal edema, no clubbing or cyanosis  Skin - normal coloration and turgor, no rashes, no suspicious skin lesions noted       Assessment/ Plan:   Diagnoses and all orders for this visit:    1.  Visit for well [place] health check  -     CBC W/O DIFF; Future  -     LIPID PANEL; Future  -     METABOLIC PANEL, COMPREHENSIVE; Future    2. Panic attacks    3. Essential hypertension    4. Encounter for long-term (current) use of other medications  -     QUANTIFERON-TB GOLD PLUS           Labs to be drawn: CBC, CMP, Lipid            I have discussed the diagnosis with the patient and the intended plan as seen in the above orders. The patient has received an after-visit summary and questions were answered concerning future plans. Medication Side Effects and Warnings were discussed with patient,  Patient Labs were reviewed and or requested, and  Patient Past Records were reviewed and or requested  yes       Chief Complaint   Patient presents with    Complete Physical     Patient is here for a wellness visit      Pt who presents for follow up of a pre-existing problem of hypertension. Diet and Lifestyle: generally follows a low fat low cholesterol diet  Home BP Monitoring: is well controlled at home, ranging 130's/90's  Use of agents associated with hypertension: none. Cardiovascular ROS: taking medications as instructed, no medication side effects noted, no TIA's, no chest pain on exertion, no dyspnea on exertion, no swelling of ankles. Patient is currently taking anxiety medication citalopram and states that this is been doing well for him. Patient denies any suicidal homicidal ideations or any depression symptoms. Patient denies any recent panic attacks. Reviewed and agree with Nurse Note and duplicated in this note. Reviewed PmHx, RxHx, FmHx, SocHx, AllgHx and updated and dated in the chart. Family History   Problem Relation Age of Onset    Hypertension Mother      History reviewed. No pertinent past medical history.    Social History     Socioeconomic History    Marital status:      Spouse name: Not on file    Number of children: Not on file    Years of education: Not on file    Highest education level: Not on file   Tobacco Use  Smoking status: Never Smoker    Smokeless tobacco: Never Used   Substance and Sexual Activity    Alcohol use: Yes     Comment: average 1 beer/day or less    Drug use: No    Sexual activity: Yes     Social Determinants of Health     Financial Resource Strain:     Difficulty of Paying Living Expenses:    Food Insecurity:     Worried About Running Out of Food in the Last Year:     Ran Out of Food in the Last Year:    Transportation Needs:     Lack of Transportation (Medical):      Lack of Transportation (Non-Medical):    Physical Activity:     Days of Exercise per Week:     Minutes of Exercise per Session:    Stress:     Feeling of Stress :    Social Connections:     Frequency of Communication with Friends and Family:     Frequency of Social Gatherings with Friends and Family:     Attends Buddhist Services:     Active Member of Clubs or Organizations:     Attends Club or Organization Meetings:     Marital Status:         Review of Systems - negative except as listed above      Objective:     Vitals:    08/04/21 1001   BP: (!) 137/91   Pulse: 66   Resp: 16   Temp: 98 °F (36.7 °C)   SpO2: 98%   Weight: 196 lb (88.9 kg)   Height: 6' 1\" (1.854 m)       Physical Examination: General appearance - alert, well appearing, and in no distress  Eyes - pupils equal and reactive, extraocular eye movements intact  Ears - bilateral TM's and external ear canals normal  Nose - normal and patent, no erythema, discharge or polyps  Mouth - mucous membranes moist, pharynx normal without lesions  Neck - supple, no significant adenopathy  Chest - clear to auscultation, no wheezes, rales or rhonchi, symmetric air entry  Heart - normal rate, regular rhythm, normal S1, S2, no murmurs, rubs, clicks or gallops  Abdomen - soft, nontender, nondistended, no masses or organomegaly  Neurological - alert, oriented, normal speech, no focal findings or movement disorder noted  Musculoskeletal - no joint tenderness, deformity or swelling  Extremities - peripheral pulses normal, no pedal edema, no clubbing or cyanosis  Skin - normal coloration and turgor, no rashes, no suspicious skin lesions noted     Assessment/ Plan:   Diagnoses and all orders for this visit:    1. Visit for well man health check  -     CBC W/O DIFF; Future  -     LIPID PANEL; Future  -     METABOLIC PANEL, COMPREHENSIVE; Future    2. Panic attacks    3. Essential hypertension  -     metoprolol succinate (TOPROL-XL) 50 mg XL tablet; TAKE 1 TABLET BY MOUTH EVERY DAY  Indications: ventricular rate control in atrial fibrillation    4. Encounter for long-term (current) use of other medications  -     QUANTIFERON-TB GOLD PLUS         Patient's blood pressures are elevated, will check at home daily and return to clinic in 4 weeks. May consider further titration of metoprolol if needed. Medication Side Effects and Warnings were discussed with patient,  Patient Labs were reviewed and or requested, and  Patient Past Records were reviewed and or requested  yes       I have discussed the diagnosis with the patient and the intended plan as seen in the above orders. The patient has received an after-visit summary and questions were answered concerning future plans. Pt agrees to call or return to clinic and/or go to closest ER with any worsening of symptoms. This may include, but not limited to increased fever (>100.4) with NSAIDS or Tylenol, increased edema, confusion, rash, worsening of presenting symptoms. Please note that this dictation was completed with [email or web address], the computer voice recognition software. Quite often unanticipated grammatical, syntax, homophones, and other interpretive errors are inadvertently transcribed by the computer software. Please disregard these errors. Please excuse any errors that have escaped final proofreading. Thank you.

## 2021-08-05 LAB
ALBUMIN SERPL-MCNC: 5 G/DL (ref 4–5)
ALBUMIN/GLOB SERPL: 1.9 {RATIO} (ref 1.2–2.2)
ALP SERPL-CCNC: 83 IU/L (ref 48–121)
ALT SERPL-CCNC: 27 IU/L (ref 0–44)
AST SERPL-CCNC: 20 IU/L (ref 0–40)
BILIRUB SERPL-MCNC: 0.5 MG/DL (ref 0–1.2)
BUN SERPL-MCNC: 14 MG/DL (ref 6–20)
BUN/CREAT SERPL: 13 (ref 9–20)
CALCIUM SERPL-MCNC: 9.9 MG/DL (ref 8.7–10.2)
CHLORIDE SERPL-SCNC: 106 MMOL/L (ref 96–106)
CHOLEST SERPL-MCNC: 220 MG/DL (ref 100–199)
CO2 SERPL-SCNC: 19 MMOL/L (ref 20–29)
CREAT SERPL-MCNC: 1.11 MG/DL (ref 0.76–1.27)
ERYTHROCYTE [DISTWIDTH] IN BLOOD BY AUTOMATED COUNT: 12.1 % (ref 11.6–15.4)
GLOBULIN SER CALC-MCNC: 2.6 G/DL (ref 1.5–4.5)
GLUCOSE SERPL-MCNC: 104 MG/DL (ref 65–99)
HCT VFR BLD AUTO: 43.8 % (ref 37.5–51)
HDLC SERPL-MCNC: 74 MG/DL
HGB BLD-MCNC: 15.1 G/DL (ref 13–17.7)
LDLC SERPL CALC-MCNC: 135 MG/DL (ref 0–99)
MCH RBC QN AUTO: 32.3 PG (ref 26.6–33)
MCHC RBC AUTO-ENTMCNC: 34.5 G/DL (ref 31.5–35.7)
MCV RBC AUTO: 94 FL (ref 79–97)
PLATELET # BLD AUTO: 250 X10E3/UL (ref 150–450)
POTASSIUM SERPL-SCNC: 5.5 MMOL/L (ref 3.5–5.2)
PROT SERPL-MCNC: 7.6 G/DL (ref 6–8.5)
RBC # BLD AUTO: 4.67 X10E6/UL (ref 4.14–5.8)
SODIUM SERPL-SCNC: 142 MMOL/L (ref 134–144)
TRIGL SERPL-MCNC: 65 MG/DL (ref 0–149)
VLDLC SERPL CALC-MCNC: 11 MG/DL (ref 5–40)
WBC # BLD AUTO: 4.3 X10E3/UL (ref 3.4–10.8)

## 2021-08-05 NOTE — PROGRESS NOTES
Sugars are slightly high, please work on diet and exercise to keep these numbers down. Your \"Bad\" cholesterol (LDL and/or triglycerides) are elevated. Please eat a healthier diet as described below. In particular avoid fried, fatty and junk foods, while increasing fiber (fruits and vegetables). If you cannot increase fiber through diet, you can supplement with metamucil as directed on bottle daily. Also, make sure you are taking 1 to 2 grams of over the counter fish oil. Increase exercise to 5 times per week of cardio lasting at least 30 min's each (biking, walking, elliptical, swimming). Lets recheck the fasting (atleast eight hours) in 6 months. Mediterranean diet: Choose this heart-healthy diet option  The Mediterranean diet is a heart-healthy eating plan combining elements of Mediterranean-style cooking. Here's how to adopt the Mediterranean diet. If you're looking for a heart-healthy eating plan, the Mediterranean diet might be right for you. The Mediterranean diet incorporates the basics of healthy eating - plus a splash of flavorful olive oil and perhaps a glass of red wine - among other components characterizing the traditional cooking style of countries bordering the [facility]. Most healthy diets include fruits, vegetables, fish and whole grains, and limit unhealthy fats. While these parts of a healthy diet remain tried-and-true, subtle variations or differences in proportions of certain foods may make a difference in your risk of heart disease. Benefits of the [address] has shown that the traditional Mediterranean diet reduces the risk of heart disease. In fact, a recent analysis of more than 1.5 million healthy adults demonstrated that following a Mediterranean diet was associated with a reduced risk of overall and cardiovascular mortality, a reduced incidence of cancer and cancer mortality, and a reduced incidence of Parkinson's and Alzheimer's diseases. For this reason, most if not all major scientific organizations encourage healthy adults to adapt a style of eating like that of the [address] [employer] [facility] for prevention of major chronic diseases. Key components of the Mediterranean diet  The Mediterranean diet emphasizes:   Getting plenty of exercise   Eating primarily plant-based foods, such as fruits and vegetables, whole grains, legumes and nuts   Replacing butter with healthy fats such as olive oil and canola oil   Using herbs and spices instead of salt to flavor foods   Limiting red meat to no more than a few times a month   Eating fish and poultry at least twice a week   Drinking red wine in moderation (optional)   The diet also recognizes the importance of enjoying meals with family and friends. Fruits, vegetables, nuts and grains  The Mediterranean diet traditionally includes fruits, vegetables, pasta and rice. For example, residents of [facility] eat very little red meat and average nine servings a day of antioxidant-rich fruits and vegetables. The Mediterranean diet has been associated with a lower level of oxidized low-density lipoprotein (LDL) cholesterol - the \"bad\" cholesterol that's more likely to build up deposits in your arteries. Nuts are another part of a healthy Mediterranean diet. Nuts are high in fat (approximately 80 percent of their calories come from fat), but most of the fat is not saturated. Because nuts are high in calories, they should not be eaten in large amounts - generally no more than a handful a day. For the best nutrition, avoid candied or honey-roasted and heavily salted nuts. Grains in the [address] [place] [address] region are typically whole grain and usually contain very few unhealthy trans fats, and bread is an important part of the diet there. However, throughout the [address] [place] [address] region, bread is eaten plain or dipped in olive oil - not eaten with butter or margarines, which contain saturated or trans fats.

## 2021-08-09 LAB
GAMMA INTERFERON BACKGROUND BLD IA-ACNC: 0 IU/ML
M TB IFN-G BLD-IMP: NEGATIVE
M TB IFN-G CD4+ BCKGRND COR BLD-ACNC: 0 IU/ML
MITOGEN IGNF BLD-ACNC: >10 IU/ML
QUANTIFERON INCUBATION, QF1T: NORMAL
QUANTIFERON TB2 AG: 0 IU/ML
SERVICE CMNT-IMP: NORMAL

## 2021-09-02 DIAGNOSIS — F41.0 PANIC ATTACKS: ICD-10-CM

## 2021-09-02 DIAGNOSIS — F41.1 GAD (GENERALIZED ANXIETY DISORDER): ICD-10-CM

## 2021-09-03 RX ORDER — CITALOPRAM 20 MG/1
TABLET, FILM COATED ORAL
Qty: 45 TABLET | Refills: 0 | Status: SHIPPED | OUTPATIENT
Start: 2021-09-03 | End: 2021-10-12 | Stop reason: SDUPTHER

## 2021-11-23 DIAGNOSIS — F41.1 GAD (GENERALIZED ANXIETY DISORDER): ICD-10-CM

## 2021-11-23 DIAGNOSIS — F41.0 PANIC ATTACKS: ICD-10-CM

## 2021-11-23 RX ORDER — CITALOPRAM 20 MG/1
30 TABLET, FILM COATED ORAL DAILY
Qty: 135 TABLET | Refills: 1 | Status: SHIPPED | OUTPATIENT
Start: 2021-11-23 | End: 2022-05-25

## 2022-02-20 DIAGNOSIS — I10 ESSENTIAL HYPERTENSION: ICD-10-CM

## 2022-02-21 RX ORDER — METOPROLOL SUCCINATE 50 MG/1
TABLET, EXTENDED RELEASE ORAL
Qty: 90 TABLET | Refills: 1 | Status: SHIPPED | OUTPATIENT
Start: 2022-02-21 | End: 2022-09-07

## 2022-08-04 ENCOUNTER — OFFICE VISIT (OUTPATIENT)
Dept: INTERNAL MEDICINE CLINIC | Age: 37
End: 2022-08-04
Payer: COMMERCIAL

## 2022-08-04 VITALS
RESPIRATION RATE: 16 BRPM | HEIGHT: 73 IN | HEART RATE: 63 BPM | WEIGHT: 184.6 LBS | BODY MASS INDEX: 24.46 KG/M2 | OXYGEN SATURATION: 98 % | SYSTOLIC BLOOD PRESSURE: 133 MMHG | DIASTOLIC BLOOD PRESSURE: 84 MMHG

## 2022-08-04 DIAGNOSIS — I10 ESSENTIAL HYPERTENSION: ICD-10-CM

## 2022-08-04 DIAGNOSIS — Z00.00 VISIT FOR WELL MAN HEALTH CHECK: Primary | ICD-10-CM

## 2022-08-04 DIAGNOSIS — F90.9 ATTENTION DEFICIT HYPERACTIVITY DISORDER (ADHD), UNSPECIFIED ADHD TYPE: ICD-10-CM

## 2022-08-04 PROCEDURE — 99395 PREV VISIT EST AGE 18-39: CPT | Performed by: FAMILY MEDICINE

## 2022-08-04 PROCEDURE — 99214 OFFICE O/P EST MOD 30 MIN: CPT | Performed by: FAMILY MEDICINE

## 2022-08-04 NOTE — PROGRESS NOTES
Chief Complaint   Patient presents with    Complete Physical    Behavioral Problem     Pt would like to discuss testing for ADD     he is a 40y.o. year old male who presents for CPE. Complete Physical Exam Questions:    1. Do you follow a low fat diet? yes  2. Are you up to date on your Tdap (<10 years)? Yes  3. Have you ever had a Pneumovax vaccine (>65)? Not applicable   EEC74 Not applicable   GKKY48 Not applicable  4. Have you had Zoster vaccine (>60)? Not applicable  5. Have you had the HPV - Gardasil (13- 26)? Not applicable  6. Do you follow an exercise program?  yes  7. Do you smoke?  no If > 65 and smoker, have you had a abdominal aortic aneurysm ultrasound screen? No  8. Do you consider yourself overweight?  no  9. Is there a family history of CAD< age 48? No  10. Is there a family history of Cancer? Yes  11. Do you know your Cancer risks? No  12. Have you had a colonoscopy? Not applicable  13. Have you been tested for HIV or other STI's? Yes HIV EPCHU(98-40 y/o)? No   14. Have you had an EKG in the last five years(>50)? Not applicable  15. Have you had a PSA test done this year (50-69)? Not applicable    Other complaints: none    Reviewed and agree with Nurse Note and duplicated in this note. Reviewed PmHx, RxHx, FmHx, SocHx, AllgHx and updated and dated in the chart. Family History   Problem Relation Age of Onset    Hypertension Mother      No past medical history on file.    Social History     Socioeconomic History    Marital status:    Tobacco Use    Smoking status: Never    Smokeless tobacco: Never   Substance and Sexual Activity    Alcohol use: Yes     Comment: average 1 beer/day or less    Drug use: No    Sexual activity: Yes        Review of Systems - negative except as listed above      Objective:     Vitals:    08/04/22 1058 08/04/22 1105 08/04/22 1107   BP: (!) 150/104 127/84 133/84   Pulse: 63     Resp: 16     SpO2: 98%     Weight: 184 lb 9.6 oz (83.7 kg) Height: 6' 1\" (1.854 m)         Physical Examination: General appearance - alert, well appearing, and in no distress  Eyes - pupils equal and reactive, extraocular eye movements intact  Ears - bilateral TM's and external ear canals normal  Nose - normal and patent, no erythema, discharge or polyps  Mouth - mucous membranes moist, pharynx normal without lesions  Neck - supple, no significant adenopathy  Chest - clear to auscultation, no wheezes, rales or rhonchi, symmetric air entry  Heart - normal rate, regular rhythm, normal S1, S2, no murmurs, rubs, clicks or gallops  Abdomen - soft, nontender, nondistended, no masses or organomegaly  Neurological - alert, oriented, normal speech, no focal findings or movement disorder noted  Musculoskeletal - no joint tenderness, deformity or swelling  Extremities - peripheral pulses normal, no pedal edema, no clubbing or cyanosis  Skin - normal coloration and turgor, no rashes, no suspicious skin lesions noted       Assessment/ Plan:   Diagnoses and all orders for this visit:    1. Visit for [facility] health check  -     CBC W/O DIFF; Future  -     LIPID PANEL; Future  -     METABOLIC PANEL, COMPREHENSIVE; Future    2. Attention deficit hyperactivity disorder (ADHD), unspecified ADHD type  -     REFERRAL TO NEUROPSYCHOLOGY    3. Essential hypertension         Labs to be drawn: CBC, CMP, Lipid            I have discussed the diagnosis with the patient and the intended plan as seen in the above orders. The patient has received an after-visit summary and questions were answered concerning future plans.        Medication Side Effects and Warnings were discussed with patient,  Patient Labs were reviewed and or requested, and  Patient Past Records were reviewed and or requested  yes       Chief Complaint   Patient presents with    Complete Physical    Behavioral Problem     Pt would like to discuss testing for ADD     Pt who presents for follow up of a pre-existing problem of hypertension. Diet and Lifestyle: generally follows a low fat low cholesterol diet  Home BP Monitoring: is well controlled at home, ranging 130's/90's  Use of agents associated with hypertension: none. Cardiovascular ROS: taking medications as instructed, no medication side effects noted, no TIA's, no chest pain on exertion, no dyspnea on exertion, no swelling of ankles. Patient states that since he has had children over the last 4 years he has noticed that he has increasing ADHD or inattentiveness symptoms. Patient allegedly had underlying ADHD but has never been tested and would like to get tested. States that it is affecting his daily life with forgetfulness. Reviewed and agree with Nurse Note and duplicated in this note. Reviewed PmHx, RxHx, FmHx, SocHx, AllgHx and updated and dated in the chart. Family History   Problem Relation Age of Onset    Hypertension Mother      No past medical history on file.    Social History     Socioeconomic History    Marital status:    Tobacco Use    Smoking status: Never    Smokeless tobacco: Never   Substance and Sexual Activity    Alcohol use: Yes     Comment: average 1 beer/day or less    Drug use: No    Sexual activity: Yes        Review of Systems - negative except as listed above      Objective:     Vitals:    08/04/22 1058 08/04/22 1105 08/04/22 1107   BP: (!) 150/104 127/84 133/84   Pulse: 63     Resp: 16     SpO2: 98%     Weight: 184 lb 9.6 oz (83.7 kg)     Height: 6' 1\" (1.854 m)         Physical Examination: General appearance - alert, well appearing, and in no distress  Eyes - pupils equal and reactive, extraocular eye movements intact  Ears - bilateral TM's and external ear canals normal  Nose - normal and patent, no erythema, discharge or polyps  Mouth - mucous membranes moist, pharynx normal without lesions  Neck - supple, no significant adenopathy  Chest - clear to auscultation, no wheezes, rales or rhonchi, symmetric air entry  Heart - normal rate, regular rhythm, normal S1, S2, no murmurs, rubs, clicks or gallops  Abdomen - soft, nontender, nondistended, no masses or organomegaly  Neurological - alert, oriented, normal speech, no focal findings or movement disorder noted  Musculoskeletal - no joint tenderness, deformity or swelling  Extremities - peripheral pulses normal, no pedal edema, no clubbing or cyanosis  Skin - normal coloration and turgor, no rashes, no suspicious skin lesions noted     Assessment/ Plan:   Diagnoses and all orders for this visit:    1. Visit for [facility] health check  -     CBC W/O DIFF; Future  -     LIPID PANEL; Future  -     METABOLIC PANEL, COMPREHENSIVE; Future    2. Attention deficit hyperactivity disorder (ADHD), unspecified ADHD type  -     REFERRAL TO NEUROPSYCHOLOGY    3. Essential hypertension               Medication Side Effects and Warnings were discussed with patient,  Patient Labs were reviewed and or requested, and  Patient Past Records were reviewed and or requested  yes       I have discussed the diagnosis with the patient and the intended plan as seen in the above orders. The patient has received an after-visit summary and questions were answered concerning future plans. Pt agrees to call or return to clinic and/or go to closest ER with any worsening of symptoms. This may include, but not limited to increased fever (>100.4) with NSAIDS or Tylenol, increased edema, confusion, rash, worsening of presenting symptoms. Please note that this dictation was completed with [employer], the computer voice recognition software. Quite often unanticipated grammatical, syntax, homophones, and other interpretive errors are inadvertently transcribed by the computer software. Please disregard these errors. Please excuse any errors that have escaped final proofreading. Thank you.

## 2022-08-04 NOTE — PROGRESS NOTES
Identified pt with two pt identifiers(name and ). Reviewed record in preparation for visit and have obtained necessary documentation. Chief Complaint   Patient presents with    Complete Physical    Behavioral Problem     Pt would like to discuss testing for ADD        Health Maintenance Due   Topic    COVID-19 Vaccine (1)    Depression Screen       Visit Vitals  /84 (BP 1 Location: Right arm, BP Patient Position: Sitting, BP Cuff Size: Adult) Comment: Arm at heart level   Pulse 63   Resp 16   Ht 6' 1\" (1.854 m)   Wt 184 lb 9.6 oz (83.7 kg)   SpO2 98%   BMI 24.36 kg/m²     Pain Scale: 0 - No pain/10    Coordination of Care Questionnaire:  :   1. Have you been to the ER, urgent care clinic since your last visit? Hospitalized since your last visit? No    2. Have you seen or consulted any other health care providers outside of the [address] [place] [address] since your last visit? Include any pap smears or colon screening.  No

## 2022-08-05 LAB
ALBUMIN SERPL-MCNC: 5.3 G/DL (ref 4–5)
ALBUMIN/GLOB SERPL: 2.4 {RATIO} (ref 1.2–2.2)
ALP SERPL-CCNC: 81 IU/L (ref 44–121)
ALT SERPL-CCNC: 30 IU/L (ref 0–44)
AST SERPL-CCNC: 19 IU/L (ref 0–40)
BILIRUB SERPL-MCNC: 0.7 MG/DL (ref 0–1.2)
BUN SERPL-MCNC: 14 MG/DL (ref 6–20)
BUN/CREAT SERPL: 13 (ref 9–20)
CALCIUM SERPL-MCNC: 9.5 MG/DL (ref 8.7–10.2)
CHLORIDE SERPL-SCNC: 101 MMOL/L (ref 96–106)
CHOLEST SERPL-MCNC: 198 MG/DL (ref 100–199)
CO2 SERPL-SCNC: 24 MMOL/L (ref 20–29)
CREAT SERPL-MCNC: 1.05 MG/DL (ref 0.76–1.27)
EGFR: 94 ML/MIN/1.73
ERYTHROCYTE [DISTWIDTH] IN BLOOD BY AUTOMATED COUNT: 12.1 % (ref 11.6–15.4)
GLOBULIN SER CALC-MCNC: 2.2 G/DL (ref 1.5–4.5)
GLUCOSE SERPL-MCNC: 92 MG/DL (ref 65–99)
HCT VFR BLD AUTO: 42.9 % (ref 37.5–51)
HDLC SERPL-MCNC: 79 MG/DL
HGB BLD-MCNC: 14.8 G/DL (ref 13–17.7)
LDLC SERPL CALC-MCNC: 107 MG/DL (ref 0–99)
MCH RBC QN AUTO: 31.3 PG (ref 26.6–33)
MCHC RBC AUTO-ENTMCNC: 34.5 G/DL (ref 31.5–35.7)
MCV RBC AUTO: 91 FL (ref 79–97)
PLATELET # BLD AUTO: 221 X10E3/UL (ref 150–450)
POTASSIUM SERPL-SCNC: 4.7 MMOL/L (ref 3.5–5.2)
PROT SERPL-MCNC: 7.5 G/DL (ref 6–8.5)
RBC # BLD AUTO: 4.73 X10E6/UL (ref 4.14–5.8)
SODIUM SERPL-SCNC: 139 MMOL/L (ref 134–144)
TRIGL SERPL-MCNC: 65 MG/DL (ref 0–149)
VLDLC SERPL CALC-MCNC: 12 MG/DL (ref 5–40)
WBC # BLD AUTO: 4.6 X10E3/UL (ref 3.4–10.8)

## 2022-08-24 DIAGNOSIS — F41.1 GAD (GENERALIZED ANXIETY DISORDER): ICD-10-CM

## 2022-08-24 DIAGNOSIS — F41.0 PANIC ATTACKS: ICD-10-CM

## 2022-08-24 RX ORDER — CITALOPRAM 20 MG/1
30 TABLET, FILM COATED ORAL DAILY
Qty: 135 TABLET | Refills: 0 | Status: SHIPPED | OUTPATIENT
Start: 2022-08-24 | End: 2022-11-22

## 2022-09-07 DIAGNOSIS — I10 ESSENTIAL HYPERTENSION: ICD-10-CM

## 2022-09-07 RX ORDER — METOPROLOL SUCCINATE 50 MG/1
TABLET, EXTENDED RELEASE ORAL
Qty: 90 TABLET | Refills: 1 | Status: SHIPPED | OUTPATIENT
Start: 2022-09-07

## 2022-11-21 DIAGNOSIS — F41.1 GAD (GENERALIZED ANXIETY DISORDER): ICD-10-CM

## 2022-11-21 DIAGNOSIS — F41.0 PANIC ATTACKS: ICD-10-CM

## 2022-11-21 RX ORDER — CITALOPRAM 20 MG/1
30 TABLET, FILM COATED ORAL DAILY
Qty: 135 TABLET | Refills: 0 | Status: SHIPPED | OUTPATIENT
Start: 2022-11-21 | End: 2023-02-19

## 2023-02-12 DIAGNOSIS — F41.0 PANIC ATTACKS: ICD-10-CM

## 2023-02-12 DIAGNOSIS — F41.1 GAD (GENERALIZED ANXIETY DISORDER): ICD-10-CM

## 2023-02-13 RX ORDER — CITALOPRAM 20 MG/1
30 TABLET, FILM COATED ORAL DAILY
Qty: 135 TABLET | Refills: 0 | Status: SHIPPED | OUTPATIENT
Start: 2023-02-13 | End: 2023-05-14

## 2023-05-08 RX ORDER — METOPROLOL SUCCINATE 50 MG/1
TABLET, EXTENDED RELEASE ORAL
Qty: 90 TABLET | Refills: 1 | Status: SHIPPED | OUTPATIENT
Start: 2023-05-08

## 2023-05-18 DIAGNOSIS — F41.0 PANIC DISORDER (EPISODIC PAROXYSMAL ANXIETY): ICD-10-CM

## 2023-05-18 DIAGNOSIS — F41.1 GENERALIZED ANXIETY DISORDER: ICD-10-CM

## 2023-05-22 RX ORDER — CITALOPRAM 20 MG/1
TABLET ORAL
Qty: 135 TABLET | Refills: 0 | Status: SHIPPED | OUTPATIENT
Start: 2023-05-22

## 2023-08-19 DIAGNOSIS — F41.0 PANIC DISORDER (EPISODIC PAROXYSMAL ANXIETY): ICD-10-CM

## 2023-08-19 DIAGNOSIS — F41.1 GENERALIZED ANXIETY DISORDER: ICD-10-CM

## 2023-08-21 RX ORDER — CITALOPRAM 20 MG/1
TABLET ORAL
Qty: 135 TABLET | Refills: 0 | OUTPATIENT
Start: 2023-08-21

## 2023-08-28 DIAGNOSIS — F41.0 PANIC DISORDER (EPISODIC PAROXYSMAL ANXIETY): ICD-10-CM

## 2023-08-28 DIAGNOSIS — F41.1 GENERALIZED ANXIETY DISORDER: ICD-10-CM

## 2023-08-28 RX ORDER — CITALOPRAM 20 MG/1
TABLET ORAL
Qty: 135 TABLET | Refills: 0 | OUTPATIENT
Start: 2023-08-28

## 2023-09-07 ENCOUNTER — TELEPHONE (OUTPATIENT)
Facility: CLINIC | Age: 38
End: 2023-09-07

## 2023-09-07 NOTE — TELEPHONE ENCOUNTER
citalopram (CELEXA) 20 MG tablet [[id number]     Medication  citalopram (CELEXA) 20 MG tablet [[id number]]  citalopram (CELEXA) 20 MG tablet [[id number]]     Order Details  Dose, Route, Frequency: As Directed   Dispense Quantity: 135 tablet Refills: 0          Sig: TAKE 1.5 TABLETS BY MOUTH DAILY FOR 90 DAYS. Start Date: 05/22/23 End Date: --   Written Date: 05/22/23 Expiration Date: 05/21/24       Diagnosis Association: Generalized anxiety disorder (F41.1); Panic disorder (episodic paroxysmal anxiety) (F41.0)   Original Order:  citalopram (CELEXA) 20 MG tablet [[id number]]   Providers    Authorizing Provider: Bernie Bangura MD NPI: [id number]   Ordering User:  Bernie Bangura MD          Pharmacy    [facility]/pharmacy #[id number] - [place]ajjar, [address] [place], [place] [id number] -  [phone]   [address] [place] [address]   Phone:  [phone]  Fax:  [phone]     Order Class:    Order Class   Normal [1]     Destination    This Order   E-PRESCRIBING INTERFACE [[id number]]     Action Summary    Action User: --            Warnings Override History    No Interaction Warnings Shown      citalopram (CELEXA) 20 MG tablet  Date: 5/22/2023 Department: [facility] Ordering/Authorizing: Bernie Bangura MD     Outpatient Medication Detail     Disp Refills Start End    citalopram (CELEXA) 20 MG tablet 135 tablet 0 5/22/2023     Sig: TAKE 1.5 TABLETS BY MOUTH DAILY FOR 90 DAYS.     Sent to pharmacy as: Citalopram Hydrobromide 20 MG Oral Tablet (CELEXA)    E-Prescribing Status: Receipt confirmed by pharmacy (5/22/2023  8:29 AM EDT)      Intervention summary for citalopram (CELEXA) 20 MG tablet (Ordered on 05/22/23)    Open Interventions    Type Subtype   Status Opened By Opened On Response Outcome   None       Closed Interventions    Type Subtype   Status Closed By Closed On Response Outcome   None       Tracking Links    Cosign Tracking Order Transmittal Tracking

## 2023-09-08 DIAGNOSIS — F41.0 PANIC DISORDER (EPISODIC PAROXYSMAL ANXIETY): ICD-10-CM

## 2023-09-08 DIAGNOSIS — F41.1 GENERALIZED ANXIETY DISORDER: ICD-10-CM

## 2023-09-08 RX ORDER — CITALOPRAM 20 MG/1
20 TABLET ORAL DAILY
Qty: 135 TABLET | Refills: 0 | Status: SHIPPED | OUTPATIENT
Start: 2023-09-08

## 2023-09-13 DIAGNOSIS — F41.1 GENERALIZED ANXIETY DISORDER: ICD-10-CM

## 2023-09-13 DIAGNOSIS — F41.0 PANIC DISORDER (EPISODIC PAROXYSMAL ANXIETY): ICD-10-CM

## 2023-09-13 RX ORDER — CITALOPRAM 20 MG/1
20 TABLET ORAL DAILY
Qty: 135 TABLET | Refills: 0 | Status: SHIPPED | OUTPATIENT
Start: 2023-09-13

## 2023-11-10 RX ORDER — METOPROLOL SUCCINATE 50 MG/1
TABLET, EXTENDED RELEASE ORAL
Qty: 90 TABLET | Refills: 1 | Status: SHIPPED | OUTPATIENT
Start: 2023-11-10

## 2023-11-13 ENCOUNTER — OFFICE VISIT (OUTPATIENT)
Facility: CLINIC | Age: 38
End: 2023-11-13

## 2023-11-13 VITALS
SYSTOLIC BLOOD PRESSURE: 125 MMHG | DIASTOLIC BLOOD PRESSURE: 89 MMHG | BODY MASS INDEX: 24.39 KG/M2 | OXYGEN SATURATION: 97 % | TEMPERATURE: 98.1 F | RESPIRATION RATE: 16 BRPM | HEIGHT: 73 IN | HEART RATE: 70 BPM | WEIGHT: 184 LBS

## 2023-11-13 DIAGNOSIS — Z23 NEED FOR VACCINATION: ICD-10-CM

## 2023-11-13 DIAGNOSIS — J01.01 ACUTE RECURRENT MAXILLARY SINUSITIS: ICD-10-CM

## 2023-11-13 DIAGNOSIS — Z00.00 ENCOUNTER FOR GENERAL ADULT MEDICAL EXAMINATION WITHOUT ABNORMAL FINDINGS: Primary | ICD-10-CM

## 2023-11-13 DIAGNOSIS — I10 ESSENTIAL (PRIMARY) HYPERTENSION: ICD-10-CM

## 2023-11-13 DIAGNOSIS — F33.1 MAJOR DEPRESSIVE DISORDER, RECURRENT, MODERATE (HCC): ICD-10-CM

## 2023-11-13 RX ORDER — AZITHROMYCIN 250 MG/1
250 TABLET, FILM COATED ORAL SEE ADMIN INSTRUCTIONS
Qty: 6 TABLET | Refills: 0 | Status: SHIPPED | OUTPATIENT
Start: 2023-11-13 | End: 2023-11-18

## 2023-11-13 SDOH — ECONOMIC STABILITY: HOUSING INSECURITY
IN THE LAST 12 MONTHS, WAS THERE A TIME WHEN YOU DID NOT HAVE A STEADY PLACE TO SLEEP OR SLEPT IN A SHELTER (INCLUDING NOW)?: NO

## 2023-11-13 SDOH — ECONOMIC STABILITY: FOOD INSECURITY: WITHIN THE PAST 12 MONTHS, YOU WORRIED THAT YOUR FOOD WOULD RUN OUT BEFORE YOU GOT MONEY TO BUY MORE.: NEVER TRUE

## 2023-11-13 SDOH — ECONOMIC STABILITY: INCOME INSECURITY: HOW HARD IS IT FOR YOU TO PAY FOR THE VERY BASICS LIKE FOOD, HOUSING, MEDICAL CARE, AND HEATING?: NOT HARD AT ALL

## 2023-11-13 SDOH — ECONOMIC STABILITY: FOOD INSECURITY: WITHIN THE PAST 12 MONTHS, THE FOOD YOU BOUGHT JUST DIDN'T LAST AND YOU DIDN'T HAVE MONEY TO GET MORE.: NEVER TRUE

## 2023-11-13 ASSESSMENT — PATIENT HEALTH QUESTIONNAIRE - PHQ9
5. POOR APPETITE OR OVEREATING: 0
SUM OF ALL RESPONSES TO PHQ QUESTIONS 1-9: 0
1. LITTLE INTEREST OR PLEASURE IN DOING THINGS: 0
1. LITTLE INTEREST OR PLEASURE IN DOING THINGS: 0
SUM OF ALL RESPONSES TO PHQ QUESTIONS 1-9: 0
6. FEELING BAD ABOUT YOURSELF - OR THAT YOU ARE A FAILURE OR HAVE LET YOURSELF OR YOUR FAMILY DOWN: 0
4. FEELING TIRED OR HAVING LITTLE ENERGY: 0
SUM OF ALL RESPONSES TO PHQ9 QUESTIONS 1 & 2: 0
7. TROUBLE CONCENTRATING ON THINGS, SUCH AS READING THE NEWSPAPER OR WATCHING TELEVISION: 0
SUM OF ALL RESPONSES TO PHQ QUESTIONS 1-9: 0
10. IF YOU CHECKED OFF ANY PROBLEMS, HOW DIFFICULT HAVE THESE PROBLEMS MADE IT FOR YOU TO DO YOUR WORK, TAKE CARE OF THINGS AT HOME, OR GET ALONG WITH OTHER PEOPLE: 0
3. TROUBLE FALLING OR STAYING ASLEEP: 0
SUM OF ALL RESPONSES TO PHQ QUESTIONS 1-9: 0
SUM OF ALL RESPONSES TO PHQ9 QUESTIONS 1 & 2: 0
2. FEELING DOWN, DEPRESSED OR HOPELESS: 0
8. MOVING OR SPEAKING SO SLOWLY THAT OTHER PEOPLE COULD HAVE NOTICED. OR THE OPPOSITE, BEING SO FIGETY OR RESTLESS THAT YOU HAVE BEEN MOVING AROUND A LOT MORE THAN USUAL: 0
SUM OF ALL RESPONSES TO PHQ QUESTIONS 1-9: 0
9. THOUGHTS THAT YOU WOULD BE BETTER OFF DEAD, OR OF HURTING YOURSELF: 0
2. FEELING DOWN, DEPRESSED OR HOPELESS: 0
SUM OF ALL RESPONSES TO PHQ QUESTIONS 1-9: 0

## 2023-11-14 LAB
BASOPHILS # BLD AUTO: 0 X10E3/UL (ref 0–0.2)
BASOPHILS NFR BLD AUTO: 1 %
EOSINOPHIL # BLD AUTO: 0.2 X10E3/UL (ref 0–0.4)
EOSINOPHIL NFR BLD AUTO: 3 %
ERYTHROCYTE [DISTWIDTH] IN BLOOD BY AUTOMATED COUNT: 11.8 % (ref 11.6–15.4)
HCT VFR BLD AUTO: 40.5 % (ref 37.5–51)
HGB BLD-MCNC: 13.7 G/DL (ref 13–17.7)
IMM GRANULOCYTES # BLD AUTO: 0 X10E3/UL (ref 0–0.1)
IMM GRANULOCYTES NFR BLD AUTO: 0 %
LYMPHOCYTES # BLD AUTO: 1.3 X10E3/UL (ref 0.7–3.1)
LYMPHOCYTES NFR BLD AUTO: 21 %
MCH RBC QN AUTO: 31.5 PG (ref 26.6–33)
MCHC RBC AUTO-ENTMCNC: 33.8 G/DL (ref 31.5–35.7)
MCV RBC AUTO: 93 FL (ref 79–97)
MONOCYTES # BLD AUTO: 0.7 X10E3/UL (ref 0.1–0.9)
MONOCYTES NFR BLD AUTO: 10 %
NEUTROPHILS # BLD AUTO: 4.2 X10E3/UL (ref 1.4–7)
NEUTROPHILS NFR BLD AUTO: 65 %
PLATELET # BLD AUTO: 233 X10E3/UL (ref 150–450)
RBC # BLD AUTO: 4.35 X10E6/UL (ref 4.14–5.8)
WBC # BLD AUTO: 6.4 X10E3/UL (ref 3.4–10.8)

## 2023-11-15 LAB
ALBUMIN SERPL-MCNC: 4.7 G/DL (ref 4.1–5.1)
ALBUMIN/GLOB SERPL: 1.9 {RATIO} (ref 1.2–2.2)
ALP SERPL-CCNC: 79 IU/L (ref 44–121)
ALT SERPL-CCNC: 17 IU/L (ref 0–44)
AST SERPL-CCNC: 12 IU/L (ref 0–40)
BILIRUB SERPL-MCNC: 0.8 MG/DL (ref 0–1.2)
BUN SERPL-MCNC: 15 MG/DL (ref 6–20)
BUN/CREAT SERPL: 14 (ref 9–20)
CALCIUM SERPL-MCNC: 9.5 MG/DL (ref 8.7–10.2)
CHLORIDE SERPL-SCNC: 98 MMOL/L (ref 96–106)
CHOLEST SERPL-MCNC: 164 MG/DL (ref 100–199)
CO2 SERPL-SCNC: 20 MMOL/L (ref 20–29)
CREAT SERPL-MCNC: 1.11 MG/DL (ref 0.76–1.27)
EGFRCR SERPLBLD CKD-EPI 2021: 87 ML/MIN/1.73
GLOBULIN SER CALC-MCNC: 2.5 G/DL (ref 1.5–4.5)
GLUCOSE SERPL-MCNC: 83 MG/DL (ref 70–99)
HDLC SERPL-MCNC: 66 MG/DL
LDLC SERPL CALC-MCNC: 87 MG/DL (ref 0–99)
POTASSIUM SERPL-SCNC: 4.7 MMOL/L (ref 3.5–5.2)
PROT SERPL-MCNC: 7.2 G/DL (ref 6–8.5)
SODIUM SERPL-SCNC: 137 MMOL/L (ref 134–144)
TRIGL SERPL-MCNC: 52 MG/DL (ref 0–149)
VLDLC SERPL CALC-MCNC: 11 MG/DL (ref 5–40)

## 2023-11-16 DIAGNOSIS — F41.0 PANIC DISORDER (EPISODIC PAROXYSMAL ANXIETY): ICD-10-CM

## 2023-11-16 DIAGNOSIS — F41.1 GENERALIZED ANXIETY DISORDER: ICD-10-CM

## 2023-11-16 LAB
M TB IFN-G BLD-IMP: NEGATIVE
M TB IFN-G CD4+ T-CELLS BLD-ACNC: 0 IU/ML
M TBIFN-G CD4+ CD8+T-CELLS BLD-ACNC: 0 IU/ML
QUANTIFERON CRITERIA: NORMAL
QUANTIFERON MITOGEN VALUE: >10 IU/ML
QUANTIFERON NIL VALUE: 0 IU/ML

## 2023-11-17 DIAGNOSIS — F41.1 GENERALIZED ANXIETY DISORDER: ICD-10-CM

## 2023-11-17 DIAGNOSIS — F41.0 PANIC DISORDER (EPISODIC PAROXYSMAL ANXIETY): ICD-10-CM

## 2023-11-17 RX ORDER — CITALOPRAM 20 MG/1
20 TABLET ORAL DAILY
Qty: 135 TABLET | Refills: 0 | Status: SHIPPED | OUTPATIENT
Start: 2023-11-17 | End: 2023-11-17 | Stop reason: SDUPTHER

## 2023-11-17 RX ORDER — CITALOPRAM 20 MG/1
30 TABLET ORAL DAILY
Qty: 135 TABLET | Refills: 0 | Status: SHIPPED | OUTPATIENT
Start: 2023-11-17 | End: 2024-02-15

## 2024-02-16 DIAGNOSIS — F41.0 PANIC DISORDER (EPISODIC PAROXYSMAL ANXIETY): ICD-10-CM

## 2024-02-16 DIAGNOSIS — F41.1 GENERALIZED ANXIETY DISORDER: ICD-10-CM

## 2024-02-16 RX ORDER — CITALOPRAM 20 MG/1
TABLET ORAL
Qty: 135 TABLET | Refills: 0 | Status: SHIPPED | OUTPATIENT
Start: 2024-02-16

## 2024-04-16 DIAGNOSIS — F41.0 PANIC DISORDER (EPISODIC PAROXYSMAL ANXIETY): ICD-10-CM

## 2024-04-16 DIAGNOSIS — F41.1 GENERALIZED ANXIETY DISORDER: ICD-10-CM

## 2024-04-17 RX ORDER — CITALOPRAM 20 MG/1
TABLET ORAL
Qty: 135 TABLET | Refills: 0 | Status: SHIPPED | OUTPATIENT
Start: 2024-04-17

## 2024-05-09 RX ORDER — METOPROLOL SUCCINATE 50 MG/1
TABLET, EXTENDED RELEASE ORAL
Qty: 90 TABLET | Refills: 1 | Status: SHIPPED | OUTPATIENT
Start: 2024-05-09

## 2024-09-26 RX ORDER — METOPROLOL SUCCINATE 50 MG/1
50 TABLET, EXTENDED RELEASE ORAL DAILY
Qty: 30 TABLET | Refills: 0 | Status: SHIPPED | OUTPATIENT
Start: 2024-09-26 | End: 2024-09-26

## 2024-09-26 RX ORDER — METOPROLOL SUCCINATE 50 MG/1
TABLET, EXTENDED RELEASE ORAL
Qty: 90 TABLET | Refills: 1 | Status: SHIPPED | OUTPATIENT
Start: 2024-09-26

## 2024-10-01 DIAGNOSIS — F41.0 PANIC DISORDER (EPISODIC PAROXYSMAL ANXIETY): ICD-10-CM

## 2024-10-01 DIAGNOSIS — F41.1 GENERALIZED ANXIETY DISORDER: ICD-10-CM

## 2024-10-01 RX ORDER — CITALOPRAM HYDROBROMIDE 20 MG/1
TABLET ORAL
Qty: 135 TABLET | Refills: 0 | Status: SHIPPED | OUTPATIENT
Start: 2024-10-01

## 2024-11-13 DIAGNOSIS — F41.1 GENERALIZED ANXIETY DISORDER: ICD-10-CM

## 2024-11-13 DIAGNOSIS — F41.0 PANIC DISORDER (EPISODIC PAROXYSMAL ANXIETY): ICD-10-CM

## 2024-11-13 RX ORDER — CITALOPRAM HYDROBROMIDE 20 MG/1
TABLET ORAL
Qty: 135 TABLET | Refills: 0 | OUTPATIENT
Start: 2024-11-13

## 2024-11-15 DIAGNOSIS — F41.1 GENERALIZED ANXIETY DISORDER: ICD-10-CM

## 2024-11-15 DIAGNOSIS — F41.0 PANIC DISORDER (EPISODIC PAROXYSMAL ANXIETY): ICD-10-CM

## 2024-11-15 RX ORDER — CITALOPRAM HYDROBROMIDE 20 MG/1
TABLET ORAL
Qty: 135 TABLET | Refills: 0 | OUTPATIENT
Start: 2024-11-15

## 2025-05-27 RX ORDER — METOPROLOL SUCCINATE 50 MG/1
TABLET, EXTENDED RELEASE ORAL
Qty: 90 TABLET | Refills: 1 | OUTPATIENT
Start: 2025-05-27

## 2025-08-13 RX ORDER — METOPROLOL SUCCINATE 50 MG/1
50 TABLET, EXTENDED RELEASE ORAL DAILY
Qty: 90 TABLET | Refills: 0 | Status: SHIPPED | OUTPATIENT
Start: 2025-08-13 | End: 2025-08-13 | Stop reason: SDUPTHER

## 2025-08-14 RX ORDER — METOPROLOL SUCCINATE 50 MG/1
50 TABLET, EXTENDED RELEASE ORAL DAILY
Qty: 90 TABLET | Refills: 0 | Status: SHIPPED | OUTPATIENT
Start: 2025-08-14